# Patient Record
Sex: MALE | Race: WHITE | Employment: FULL TIME | URBAN - METROPOLITAN AREA
[De-identification: names, ages, dates, MRNs, and addresses within clinical notes are randomized per-mention and may not be internally consistent; named-entity substitution may affect disease eponyms.]

---

## 2018-07-01 ENCOUNTER — OFFICE VISIT (OUTPATIENT)
Dept: FAMILY MEDICINE CLINIC | Age: 23
End: 2018-07-01

## 2018-07-01 VITALS
SYSTOLIC BLOOD PRESSURE: 136 MMHG | BODY MASS INDEX: 19.22 KG/M2 | RESPIRATION RATE: 18 BRPM | OXYGEN SATURATION: 98 % | WEIGHT: 145 LBS | DIASTOLIC BLOOD PRESSURE: 63 MMHG | HEIGHT: 73 IN | HEART RATE: 95 BPM

## 2018-07-01 DIAGNOSIS — J01.10 ACUTE NON-RECURRENT FRONTAL SINUSITIS: Primary | ICD-10-CM

## 2018-07-01 DIAGNOSIS — H10.32 ACUTE BACTERIAL CONJUNCTIVITIS OF LEFT EYE: ICD-10-CM

## 2018-07-01 PROCEDURE — 99203 OFFICE O/P NEW LOW 30 MIN: CPT | Performed by: PHYSICIAN ASSISTANT

## 2018-07-01 RX ORDER — BROMPHENIRAMINE MALEATE, PSEUDOEPHEDRINE HYDROCHLORIDE, AND DEXTROMETHORPHAN HYDROBROMIDE 2; 30; 10 MG/5ML; MG/5ML; MG/5ML
10 SYRUP ORAL 4 TIMES DAILY PRN
Qty: 120 ML | Refills: 0 | Status: SHIPPED | OUTPATIENT
Start: 2018-07-01 | End: 2019-11-11 | Stop reason: ALTCHOICE

## 2018-07-01 RX ORDER — TOBRAMYCIN 3 MG/ML
2 SOLUTION/ DROPS OPHTHALMIC EVERY 6 HOURS
Qty: 1 BOTTLE | Refills: 0 | Status: SHIPPED | OUTPATIENT
Start: 2018-07-01 | End: 2018-07-06

## 2019-11-07 ENCOUNTER — NURSE TRIAGE (OUTPATIENT)
Dept: OTHER | Facility: CLINIC | Age: 24
End: 2019-11-07

## 2019-11-11 ENCOUNTER — HOSPITAL ENCOUNTER (OUTPATIENT)
Dept: PSYCHIATRY | Age: 24
Setting detail: THERAPIES SERIES
Discharge: HOME | End: 2019-11-11
Payer: COMMERCIAL

## 2019-11-11 ENCOUNTER — HOSPITAL ENCOUNTER (OUTPATIENT)
Age: 24
Setting detail: THERAPIES SERIES
Discharge: HOME | End: 2019-11-11
Payer: COMMERCIAL

## 2019-11-11 VITALS
RESPIRATION RATE: 16 BRPM | SYSTOLIC BLOOD PRESSURE: 146 MMHG | WEIGHT: 150 LBS | DIASTOLIC BLOOD PRESSURE: 59 MMHG | HEIGHT: 73 IN | BODY MASS INDEX: 19.88 KG/M2 | HEART RATE: 80 BPM

## 2019-11-11 DIAGNOSIS — F33.1 MDD (MAJOR DEPRESSIVE DISORDER), RECURRENT EPISODE, MODERATE (HCC): ICD-10-CM

## 2019-11-11 DIAGNOSIS — F41.9 ANXIETY DISORDER, UNSPECIFIED TYPE: ICD-10-CM

## 2019-11-11 PROCEDURE — 99204 OFFICE O/P NEW MOD 45 MIN: CPT | Performed by: PSYCHIATRY & NEUROLOGY

## 2019-11-11 PROCEDURE — 90853 GROUP PSYCHOTHERAPY: CPT

## 2019-11-11 RX ORDER — SERTRALINE HYDROCHLORIDE 100 MG/1
100 TABLET, FILM COATED ORAL DAILY
Qty: 30 TABLET | Refills: 1 | Status: SHIPPED | OUTPATIENT
Start: 2019-11-11 | End: 2020-05-05 | Stop reason: SDUPTHER

## 2019-11-14 ENCOUNTER — HOSPITAL ENCOUNTER (OUTPATIENT)
Dept: PSYCHIATRY | Age: 24
Setting detail: THERAPIES SERIES
Discharge: HOME | End: 2019-11-14
Payer: COMMERCIAL

## 2019-11-14 PROCEDURE — 90853 GROUP PSYCHOTHERAPY: CPT

## 2019-11-15 ENCOUNTER — HOSPITAL ENCOUNTER (OUTPATIENT)
Dept: PSYCHIATRY | Age: 24
Setting detail: THERAPIES SERIES
Discharge: HOME | End: 2019-11-15
Payer: COMMERCIAL

## 2019-11-15 PROCEDURE — 90853 GROUP PSYCHOTHERAPY: CPT

## 2019-11-18 ENCOUNTER — HOSPITAL ENCOUNTER (OUTPATIENT)
Dept: PSYCHIATRY | Age: 24
Setting detail: THERAPIES SERIES
Discharge: HOME | End: 2019-11-18
Payer: COMMERCIAL

## 2019-11-18 PROCEDURE — 90853 GROUP PSYCHOTHERAPY: CPT

## 2019-11-21 ENCOUNTER — HOSPITAL ENCOUNTER (OUTPATIENT)
Dept: PSYCHIATRY | Age: 24
Setting detail: THERAPIES SERIES
Discharge: HOME | End: 2019-11-21
Payer: COMMERCIAL

## 2019-11-21 ENCOUNTER — APPOINTMENT (OUTPATIENT)
Dept: PSYCHIATRY | Age: 24
End: 2019-11-21
Payer: COMMERCIAL

## 2019-11-21 PROCEDURE — 90853 GROUP PSYCHOTHERAPY: CPT

## 2019-11-22 ENCOUNTER — HOSPITAL ENCOUNTER (OUTPATIENT)
Dept: PSYCHIATRY | Age: 24
Setting detail: THERAPIES SERIES
Discharge: HOME | End: 2019-11-22
Payer: COMMERCIAL

## 2019-11-22 DIAGNOSIS — F33.1 MAJOR DEPRESSIVE DISORDER, RECURRENT, MODERATE (HCC): ICD-10-CM

## 2019-11-22 PROCEDURE — 99213 OFFICE O/P EST LOW 20 MIN: CPT | Performed by: PSYCHIATRY & NEUROLOGY

## 2019-11-22 PROCEDURE — 90853 GROUP PSYCHOTHERAPY: CPT

## 2019-11-25 ENCOUNTER — HOSPITAL ENCOUNTER (OUTPATIENT)
Dept: PSYCHIATRY | Age: 24
Setting detail: THERAPIES SERIES
Discharge: HOME | End: 2019-11-25
Payer: COMMERCIAL

## 2019-11-25 PROCEDURE — 90853 GROUP PSYCHOTHERAPY: CPT

## 2019-12-05 ENCOUNTER — HOSPITAL ENCOUNTER (OUTPATIENT)
Dept: PSYCHIATRY | Age: 24
Setting detail: THERAPIES SERIES
Discharge: HOME | End: 2019-12-05
Payer: COMMERCIAL

## 2019-12-05 PROCEDURE — 90853 GROUP PSYCHOTHERAPY: CPT

## 2019-12-06 ENCOUNTER — HOSPITAL ENCOUNTER (OUTPATIENT)
Dept: PSYCHIATRY | Age: 24
Setting detail: THERAPIES SERIES
Discharge: HOME | End: 2019-12-06
Payer: COMMERCIAL

## 2019-12-06 DIAGNOSIS — F33.1 MAJOR DEPRESSIVE DISORDER, RECURRENT, MODERATE (HCC): ICD-10-CM

## 2019-12-06 PROCEDURE — 99213 OFFICE O/P EST LOW 20 MIN: CPT | Performed by: PSYCHIATRY & NEUROLOGY

## 2019-12-06 PROCEDURE — 90853 GROUP PSYCHOTHERAPY: CPT

## 2019-12-09 ENCOUNTER — HOSPITAL ENCOUNTER (OUTPATIENT)
Dept: PSYCHIATRY | Age: 24
Setting detail: THERAPIES SERIES
Discharge: HOME | End: 2019-12-09
Payer: COMMERCIAL

## 2019-12-09 PROCEDURE — 90853 GROUP PSYCHOTHERAPY: CPT

## 2019-12-12 ENCOUNTER — HOSPITAL ENCOUNTER (OUTPATIENT)
Dept: PSYCHIATRY | Age: 24
Setting detail: THERAPIES SERIES
Discharge: HOME | End: 2019-12-12
Payer: COMMERCIAL

## 2019-12-12 PROCEDURE — 90853 GROUP PSYCHOTHERAPY: CPT

## 2019-12-13 ENCOUNTER — HOSPITAL ENCOUNTER (OUTPATIENT)
Dept: PSYCHIATRY | Age: 24
Setting detail: THERAPIES SERIES
Discharge: HOME | End: 2019-12-13
Payer: COMMERCIAL

## 2019-12-13 PROCEDURE — 90853 GROUP PSYCHOTHERAPY: CPT

## 2019-12-16 ENCOUNTER — HOSPITAL ENCOUNTER (OUTPATIENT)
Dept: PSYCHIATRY | Age: 24
Setting detail: THERAPIES SERIES
Discharge: HOME | End: 2019-12-16
Payer: COMMERCIAL

## 2019-12-16 PROCEDURE — 90853 GROUP PSYCHOTHERAPY: CPT

## 2019-12-19 ENCOUNTER — HOSPITAL ENCOUNTER (OUTPATIENT)
Dept: PSYCHIATRY | Age: 24
Setting detail: THERAPIES SERIES
Discharge: HOME | End: 2019-12-19
Payer: COMMERCIAL

## 2019-12-19 DIAGNOSIS — F33.1 MAJOR DEPRESSIVE DISORDER, RECURRENT, MODERATE (HCC): ICD-10-CM

## 2019-12-19 PROCEDURE — 90853 GROUP PSYCHOTHERAPY: CPT

## 2019-12-19 PROCEDURE — 99213 OFFICE O/P EST LOW 20 MIN: CPT | Performed by: PSYCHIATRY & NEUROLOGY

## 2019-12-20 ENCOUNTER — HOSPITAL ENCOUNTER (OUTPATIENT)
Dept: PSYCHIATRY | Age: 24
Setting detail: THERAPIES SERIES
Discharge: HOME | End: 2019-12-20
Payer: COMMERCIAL

## 2019-12-20 PROCEDURE — 90853 GROUP PSYCHOTHERAPY: CPT

## 2019-12-23 ENCOUNTER — HOSPITAL ENCOUNTER (OUTPATIENT)
Dept: PSYCHIATRY | Age: 24
Setting detail: THERAPIES SERIES
Discharge: HOME | End: 2019-12-23
Payer: COMMERCIAL

## 2019-12-23 PROCEDURE — 90853 GROUP PSYCHOTHERAPY: CPT

## 2019-12-26 ENCOUNTER — TELEPHONE (OUTPATIENT)
Dept: PSYCHIATRY | Age: 24
End: 2019-12-26

## 2019-12-27 ENCOUNTER — HOSPITAL ENCOUNTER (OUTPATIENT)
Dept: PSYCHIATRY | Age: 24
Setting detail: THERAPIES SERIES
Discharge: HOME | End: 2019-12-27
Payer: COMMERCIAL

## 2019-12-27 PROCEDURE — 90853 GROUP PSYCHOTHERAPY: CPT

## 2020-01-02 ENCOUNTER — HOSPITAL ENCOUNTER (OUTPATIENT)
Dept: PSYCHIATRY | Age: 25
Setting detail: THERAPIES SERIES
Discharge: HOME | End: 2020-01-02
Payer: COMMERCIAL

## 2020-01-02 PROCEDURE — 99213 OFFICE O/P EST LOW 20 MIN: CPT | Performed by: PSYCHIATRY & NEUROLOGY

## 2020-01-02 PROCEDURE — 90853 GROUP PSYCHOTHERAPY: CPT

## 2020-01-02 NOTE — BH NOTE
PSYCHIATRY ATTENDING NOTE    CC: \"I'm doing OK. \"    S: Patient being seen at [facility] in follow-up for depression and anxiety. No medication changes made last appointment. Met with patient today to discuss progress with treatment. Leila Yeager reports court went well this week. His license was suspended for a year but he has driving privileges and was not put on probation. Patient continues to refrain from alcohol but admits to using mother's Ambien and Ativan twice to get high. We discussed transitioning his care to [facility] dual diagnosis [facility] which should be starting within a week or so. No issues otherwise. Mood stable overall. Patient attending three [place] [id number] meetings a week now as well. MSE: Dorene Norris male appears age. Pleasant, cooperative, forthcoming. Normal psychomotor activity, gait, strength, tone, eye contact. Mood euthymic. Affect flexible. Speech clear. Thoughts organized. Content future-oriented. No suicidal or homicidal ideations. No paranoia, delusions, hallucinations. Orientation, concentration, recent and remote memory are grossly intact. Fund of knowledge fair. Language use fair. Insight and judgment fair. MEDICATIONS:  Zoloft 100 mg daily (continuing)    ASSESSMENT:    MDD recurrent moderate                                      Alcohol Use Disorder                                      Anxiety Disorder unspecified    PLAN: Continue [facility] and current treatment. Transition to [facility] when able.  Discuss with treatment team.                           Electronically signed by Florentino Jade MD on 1/2/2020 at 12:55 PM

## 2020-01-16 ENCOUNTER — HOSPITAL ENCOUNTER (OUTPATIENT)
Dept: PSYCHIATRY | Age: 25
Setting detail: THERAPIES SERIES
End: 2020-01-16
Payer: COMMERCIAL

## 2020-04-29 ENCOUNTER — HOSPITAL ENCOUNTER (OUTPATIENT)
Dept: PSYCHIATRY | Age: 25
Setting detail: THERAPIES SERIES
Discharge: HOME | End: 2020-04-29
Payer: COMMERCIAL

## 2020-04-29 PROCEDURE — H0001 ALCOHOL AND/OR DRUG ASSESS: HCPCS | Performed by: COUNSELOR

## 2020-04-29 RX ORDER — SERTRALINE HYDROCHLORIDE 100 MG/1
200 TABLET, FILM COATED ORAL DAILY
COMMUNITY
End: 2020-05-05 | Stop reason: HOSPADM

## 2020-04-29 RX ORDER — HYDROXYZINE HYDROCHLORIDE 25 MG/1
25 TABLET, FILM COATED ORAL 4 TIMES DAILY
COMMUNITY
End: 2020-05-05 | Stop reason: SDUPTHER

## 2020-04-29 RX ORDER — ARIPIPRAZOLE 30 MG/1
500 TABLET ORAL DAILY
COMMUNITY
End: 2020-05-05 | Stop reason: SDUPTHER

## 2020-04-29 ASSESSMENT — LIFESTYLE VARIABLES: HISTORY_ALCOHOL_USE: YES

## 2020-04-29 ASSESSMENT — PATIENT HEALTH QUESTIONNAIRE - PHQ9: SUM OF ALL RESPONSES TO PHQ QUESTIONS 1-9: 12

## 2020-04-29 NOTE — PLAN OF CARE
monitoring  [] None or stable    [] Receiving concurrent medical monitoring [] Requires 24-hour medical monitoring  but not intensive treatment [] Requires 24-hour medical & nursing care and the full  resources of a licensed hospital   COMMENTS:           Dimension 3  Emotional,  Behavioral,  or Cognitive Conditions and Complications   (EBC/C) [] None or very stable    [] Receiving concurrent mental health monitoring [x] Mild severity  with potential to distract from recovery; needs monitoring [] Mild to moderate severity, with potential to distract from recovery, needs stabilization [] None or minimal; not distracting to recovery    [] If  stable, a    co-occurring capable program is appropriate    [] If not stable, a co-occurring enhanced program is required [] Mild to moderate severity; needs structure to focus on recovery. Treatment should be designed to address significant cognitive deficits     [] If  stable, a  co-occurring capable program is appropriate    [] If not stable, a co-occurring enhanced program is required [] Demonstrates repeated inability to control impulses or unstable & dangerous signs/ symptoms require stabilization. Other functional deficits require stabilization and 24-hr.  setting to prepare for community integration  & continuing care    [] Co-occurring enhanced setting is required for those with severe & chronic mental illness [] Moderate severity;  needs 24-hour   structured setting    [] If client has co-occurring mental disorder, requires concurrent mental health services in a medically monitored setting  [] Severe and unstable problems;  requires 24-hour psychiatric care  with concomitant addiction treatment   COMMENTS:             Staff: Electronically signed by Chris Pineda LPC on 4/29/2020 at 2:20 PM                     [address]      [x]Admissions  []Continued Stay []Discharge/Transfer / Complication in

## 2020-04-29 NOTE — CARE COORDINATION
DIAGNOSTIC IMPRESSIONS: Substance-Use Disorder (SUB)    Scale: DSM-V Diagnosis Code   No diagnosis                    0-1    Mild MONIQUE                          2-3    Moderate MONIQUE                 4-5    Severe MONIQUE                      6+ F10.20 Alcohol use disorder, severe   Other factors: F.33.2 Major depressive disorder, recurrent, severe          Criteria symptoms   A problematic pattern of substance use leading to clinically significant impairment or distress, as manifested by at least two of the following, occurring within a 12-month period. [x] Taken in larger amounts or over longer time than intended. [x] Persistent desire or unsuccessful efforts to cut down/control use. [x] Great deal of time spent obtaining , using, and recovering from effects. [x] Craving or strong desire to use. [x] Recurrent use resulting in failure to fulfill major role obligations at work; school, or home. [x] Continued use despite persistent or recurrent social/interpersonal problems caused or exacerbated by effects. [x] Giving up important social, occupational or recreational activities because of use. [x] Recurrent use in situations in which it is physically hazardous. [x] Continued use despite knowledge of persistent or recurrent physical or psychological problem likely caused/exacerbated by use. [x] Tolerance as defined by either:  · Need for increased amounts to achieve intoxication or desired effects. · Diminished effect with continued use of the same amount.     [] Withdrawal as manifested by either:  · The characteristic withdrawl symptoms  · Using to relieve or avoid withdrawal symptoms

## 2020-04-29 NOTE — CARE COORDINATION
LPC met with patient to complete psychosocial assessment and CSSR-S.     Pt was self-referred to this facility. During assessment, the patient was cooperative and open. Pt exhibited high motivation for treatment expressing frustration over relapses.     Pt is in a supportive environment living in a home with his mother. Pt identifies as a homosexual male.     Pt has [employer] [place] insurance.     Based upon assessment, pt meets the diagnostic criteria for an Alcohol use disorder, severe. Additionally, patient meets criteria for major depressive disorder, recurrent, severe.     Pt reviewed all paperwork, consents, and YEVGENIY for insurance-pt provided verbal consent due to telehealth service requirement.     Pt's goal is to [facility] the changes stick. \"     Pt will begin dual-diagnosis IOP individually this week via telehealth, on 05/04. Pt acknowledges and agrees to treatment recommendations.

## 2020-05-04 ENCOUNTER — HOSPITAL ENCOUNTER (OUTPATIENT)
Dept: PSYCHIATRY | Age: 25
Setting detail: THERAPIES SERIES
Discharge: HOME | End: 2020-05-04
Payer: COMMERCIAL

## 2020-05-04 PROCEDURE — 90832 PSYTX W PT 30 MINUTES: CPT | Performed by: COUNSELOR

## 2020-05-05 ENCOUNTER — HOSPITAL ENCOUNTER (OUTPATIENT)
Dept: PSYCHIATRY | Age: 25
Setting detail: THERAPIES SERIES
Discharge: HOME | End: 2020-05-05
Payer: COMMERCIAL

## 2020-05-05 RX ORDER — HYDROXYZINE HYDROCHLORIDE 25 MG/1
25 TABLET, FILM COATED ORAL 4 TIMES DAILY PRN
Qty: 120 TABLET | Refills: 2 | Status: SHIPPED | OUTPATIENT
Start: 2020-05-05 | End: 2020-05-26 | Stop reason: SDUPTHER

## 2020-05-05 RX ORDER — ARIPIPRAZOLE 5 MG/1
5 TABLET ORAL DAILY
Qty: 30 TABLET | Refills: 2 | Status: ON HOLD
Start: 2020-05-05 | End: 2020-11-12 | Stop reason: HOSPADM

## 2020-05-05 RX ORDER — SERTRALINE HYDROCHLORIDE 100 MG/1
200 TABLET, FILM COATED ORAL DAILY
Qty: 60 TABLET | Refills: 2 | Status: ON HOLD
Start: 2020-05-05 | End: 2020-11-12 | Stop reason: HOSPADM

## 2020-05-05 NOTE — H&P
has been resistant to seeking treatment. SOCIAL HISTORY: Patient grew up with both parents who were  in May 2018. Patient has a 35year-old half-brother through mother's first marriage. Patient graduated high school and attended [address] before ultimately transferring to [employer] where he graduated with a biology degree. 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 He is currently off work. Had DUI last year but legal obligations completed. Lives with mother. MENTAL STATUS EXAM: Pleasant, cooperative, forthcoming. Mood euthymic. Speech clear. Thoughts organized. Content future-oriented. No suicidal or homicidal ideations. No paranoia, delusions, hallucinations. Orientation, concentration, recent and remote memory are grossly intact. Fund of knowledge fair. Language use fair. Insight and judgment fair. MEDICATIONS:  Zoloft 200 mg daily (cont)     Abilify 5 mg daily (cont)     Atarax 25 mg qid prn (cont)     ASSESSMENT:  Alcohol Use Disorder     MDD recurrent moderate                                      Anxiety Disorder unspecified    PLAN: Admit to [facility]. Treatment will include group therapy, relapse prevention, case management and pharmacotherapy. Continue same psychotropics for now. Consider naltrexone for alcohol cravings. Discuss with team. Speak with patient again next week.        Signed:  Electronically signed by Jaleel Lund MD on 5/5/2020 at 11:48 AM

## 2020-05-11 ENCOUNTER — HOSPITAL ENCOUNTER (OUTPATIENT)
Dept: PSYCHIATRY | Age: 25
Setting detail: THERAPIES SERIES
Discharge: HOME | End: 2020-05-11
Payer: COMMERCIAL

## 2020-05-11 PROCEDURE — 90832 PSYTX W PT 30 MINUTES: CPT | Performed by: COUNSELOR

## 2020-05-11 NOTE — VIRTUAL HEALTH
Consults  Patient Location: at his home    Provider Location [facility] [address] [facility] [address] [place]    Start time: 9:30 AM  End time: 10:00 AM    Pt reports that he is doing \"good\" at check-in. Pt reports that he has been in his new home for a week today and has been somewhat emotional about it, since he grew up in the house he moved out of. Pt states he is adjusting but still has some emotion about it. Pt processed this with LPC. Pt also reports he has a job interview on Friday and is eager to get back to working. He believes this will help him to stay busy. Pt reports his depression is a 4 on a scale of 1-10 (10 being the worst), and his urges to drink are a 5 on the same scale. Pt reports he is entering his 3rd week of not drinking and has been focusing on staying busy. He reports the move and unpacking helps him with this. Pt states his goal for today is to focus on unpacking his bathroom and if he finishes that, he will journal.     Pt denies SI/HI/hallucinations and can contract for safety. Pt agreeable to video appointment on Thursday. Pt provided with [employer] Inchart instructions as well as relapse prevention/protective factors assignment via email. This virtual visit was conducted via interactive/real-time audio/video.

## 2020-05-12 ENCOUNTER — HOSPITAL ENCOUNTER (OUTPATIENT)
Dept: PSYCHIATRY | Age: 25
Setting detail: THERAPIES SERIES
Discharge: HOME | End: 2020-05-12
Payer: COMMERCIAL

## 2020-05-12 RX ORDER — NALTREXONE HYDROCHLORIDE 50 MG/1
50 TABLET, FILM COATED ORAL DAILY
Qty: 30 TABLET | Refills: 0 | Status: SHIPPED | OUTPATIENT
Start: 2020-05-12 | End: 2020-06-09 | Stop reason: SDUPTHER

## 2020-05-14 ENCOUNTER — HOSPITAL ENCOUNTER (OUTPATIENT)
Dept: PSYCHIATRY | Age: 25
Setting detail: THERAPIES SERIES
Discharge: HOME | End: 2020-05-14
Payer: COMMERCIAL

## 2020-05-14 PROCEDURE — 90832 PSYTX W PT 30 MINUTES: CPT | Performed by: COUNSELOR

## 2020-05-19 ENCOUNTER — HOSPITAL ENCOUNTER (OUTPATIENT)
Dept: PSYCHIATRY | Age: 25
Setting detail: THERAPIES SERIES
Discharge: HOME | End: 2020-05-19
Payer: COMMERCIAL

## 2020-05-19 PROCEDURE — 90832 PSYTX W PT 30 MINUTES: CPT | Performed by: COUNSELOR

## 2020-05-19 NOTE — VIRTUAL HEALTH
Consults  Patient Location: at his home    Provider Location ([facility]): [address] [facility] [address] [place]    Start time: 10:00 AM  End time: 10:25 AM    Pt reports that he is \"anxious\" at check-in. Pt reports increased anxiety especially at night, stating that the has been having nightmares with night sweats and he feels as if the anxiety is increased due to this. When asked if he wanted me to change his med management appointment, he declined, but stated he will keep me posted if it worsens. Pt processed his current anxiety with LPC. Pt rates the anxiety a 6 on a scale of 1-10 (10 being the worst), his depression a 3, and his urges to drink a 1. Pt reports he has found that remaining on a \"regimented schedule\" is the best plan for him, as he stays busy and his mind doesn't wander as much. When asked about his daily coping strategies for anxiety, depression, or urges, the pt reports that he has been doing online [employer], helping his dad with work for a few hours a day, computer jenna, and spending time with one of his friends by going for walks or having movie nights. Pt states that he finds it helpful to talk to his sober supports. LPC challenged pt to return to journaling daily, as he did when he was at his inpatient rehab facility. Pt was provided with suggestions on writing prompts such as mindfulness topics and self-awareness exercises for anxiety. Pt agreeable to intervention. Pt reports it has been 4-5 weeks since he last had alcohol. Pt continues to avoid tracking the exact days, as he feels it's more helpful for him. Pt denies SI/HI/hallucinations and can contract for safety. This virtual visit was conducted via interactive/real-time audio/video.

## 2020-05-26 ENCOUNTER — HOSPITAL ENCOUNTER (OUTPATIENT)
Dept: PSYCHIATRY | Age: 25
Setting detail: THERAPIES SERIES
Discharge: HOME | End: 2020-05-26
Payer: COMMERCIAL

## 2020-05-26 PROCEDURE — 99212 OFFICE O/P EST SF 10 MIN: CPT | Performed by: PSYCHIATRY & NEUROLOGY

## 2020-05-26 PROCEDURE — 90832 PSYTX W PT 30 MINUTES: CPT | Performed by: COUNSELOR

## 2020-05-26 RX ORDER — HYDROXYZINE HYDROCHLORIDE 25 MG/1
25 TABLET, FILM COATED ORAL 4 TIMES DAILY PRN
Qty: 120 TABLET | Refills: 2 | Status: ON HOLD
Start: 2020-05-26 | End: 2020-11-12 | Stop reason: HOSPADM

## 2020-05-26 ASSESSMENT — PATIENT HEALTH QUESTIONNAIRE - PHQ9: SUM OF ALL RESPONSES TO PHQ QUESTIONS 1-9: 6

## 2020-05-26 NOTE — PROGRESS NOTES
CLINICAL PHARMACY NOTE: MEDS TO [address] Select Patient?: Yes  Total # of Prescriptions Filled: 1   The following medications were delivered to the patient:  · Hydroxyzine 25 mg  Total # of Interventions Completed: 3  Time Spent (min): 15    Additional Documentation:

## 2020-05-26 NOTE — BH NOTE
PSYCHIATRY ATTENDING NOTE    CC: \"Pretty well. \"    S: Patient being seen at [facility] in follow-up for alcohol use and depression. Naltrexone was started last appointment. Spoke with patient on the phone due to coronavirus precautions which he consented to. Patient's location was home. Provider's location was [address] [facility]. Leigh Ann Steel reports he is doing well overall. He is tolerating the naltrexone and feels it is helping with alcohol cravings. He does mention \"bad using dreams\" but says this was also happening when he was in rehab. Patient is about one month sober now and is attending online [employer] meetings. He is self-quarantining at home after his mother tested positive for covid-19. Patient was tested and is negative. No acute issues otherwise. Patient would like to resume group therapy once this is possible. MSE: Pleasant, cooperative, forthcoming. Mood euthymic. Speech clear. Thoughts organized. Content future-oriented. No suicidal or homicidal ideations. No paranoia, delusions, hallucinations. Orientation, concentration, recent and remote memory are grossly intact. Fund of knowledge fair. Language use fair. Insight and judgment fair. MEDICATIONS:  Zoloft 200 mg daily (cont)                                      Abilify 5 mg daily (cont)                                      Atarax 25 mg qid prn (cont)     Naltrexone 50 mg daily (cont)    ASSESSMENT:  Alcohol Use Disorder                                      MDD recurrent moderate                                      Anxiety Disorder unspecified    PLAN: Continue [facility] and current medications. Responding well to treatment interventions. Continue to monitor and provide support. Speak with patient in two weeks.  Discuss with team.     Total time spent 10 min                           Electronically signed by Elizabeth Avilez MD on 5/26/2020 at 12:05 PM

## 2020-06-09 ENCOUNTER — HOSPITAL ENCOUNTER (OUTPATIENT)
Dept: PSYCHIATRY | Age: 25
Setting detail: THERAPIES SERIES
Discharge: HOME | End: 2020-06-09
Payer: COMMERCIAL

## 2020-06-09 PROCEDURE — 99212 OFFICE O/P EST SF 10 MIN: CPT | Performed by: PSYCHIATRY & NEUROLOGY

## 2020-06-09 PROCEDURE — 90832 PSYTX W PT 30 MINUTES: CPT | Performed by: COUNSELOR

## 2020-06-09 RX ORDER — NALTREXONE HYDROCHLORIDE 50 MG/1
50 TABLET, FILM COATED ORAL DAILY
Qty: 30 TABLET | Refills: 2 | Status: SHIPPED | OUTPATIENT
Start: 2020-06-09

## 2020-06-09 NOTE — VIRTUAL HEALTH
Consults  Patient Location: at his home    Provider Location ([facility]/State):  [address]  [place] [address]    Individual Therapy note    Date: 06/09/2020  Start time: 11:00 AM  End time: 11:17 AM    Patient's goal: \"Make the changes stick. \"    Notes: Pt reports that he is \"doing well\" at check-in. Pt reports he is about 6 weeks sober today. Pt states he has been keeping very busy and finds that he has been coping better and better over time. On a scale of 1-10 (10 being the worst) pt rates his depression a 2-3, his anxiety a 4, and his urges/cravings for alcohol a 4. When asked about how he kerry with these urges and emotional issues, the pt states he has been reaching out to his sober supports and talking about his issues more. He also states he has been keeping busy around the house building furniture, and spending time with friends and family. Pt reports he is attending [place] [id number] meetings online every other day currently and has still remained in treatment with his outpatient counselor at [facility]. Pt is still striving to better himself, implementing healthy habits such as eating better, and he has a job interview at [employer] in [place] next week. Pt denies any SI/HI/hallucinations and can contract for safety. Status After Intervention:  Unchanged    Participation Level: Active Listener and Interactive    Participation Quality: Appropriate, Attentive and Sharing      Speech:  normal      Thought Process/Content: Logical      Affective Functioning: Congruent      Mood: euthymic      Level of consciousness:  Alert, Oriented x4 and Attentive      Response to Learning: Able to verbalize current knowledge/experience, Able to verbalize/acknowledge new learning, Capable of insight and Able to change behavior      Endings: None Reported    Discipline Responsible: /Counselor          This virtual visit was conducted via interactive/real-time audio/video.

## 2020-06-23 ENCOUNTER — HOSPITAL ENCOUNTER (OUTPATIENT)
Dept: PSYCHIATRY | Age: 25
Setting detail: THERAPIES SERIES
Discharge: HOME | End: 2020-06-23
Payer: COMMERCIAL

## 2020-06-23 PROCEDURE — 90832 PSYTX W PT 30 MINUTES: CPT | Performed by: COUNSELOR

## 2020-06-23 NOTE — VIRTUAL HEALTH
Consults  Patient Location:at his home    Provider Location ([facility]): [address] [facility] [address] [place]    Individual Therapy note    Date: 06/23/2020  Start time: 10:00 AM  End time: 10:19 AM    Patient's goal: \"Make the changes stick. \"    Notes: Pt reports that he is doing \"well\" at check-in. Pt states he is approximately 2 months sober. Pt states he is proud of this and is happy he is sustaining his sobriety. On a scale of 1-10 (10 being the worst), the pt rates his depression a 3, anxiety a 5, and his urges/cravings to drink alcohol, a 6. When asked about the increased urges, the pt attributes boredom and \"complacency\" to it. Pt states that when his anxiety is high or he is bored, he thinks about drinking more. Pt processed these feelings with LPC. LPC challenged pt to begin making a plan for moments of boredom or high anxiety. Pt identifies writing in his journal or working out as good distraction/coping techniques if these moments arise in the future. LPC also provided pt with a starter relapse prevention plan via email to begin working on. In regard to his anxiety, the pt states that it often comes up randomly and doesn't always have a triggering stressor. Pt states that when he is feeling anxious, he has found that talking to his positive sober supports as an effective coping skill. Pt stated his mother and close friends are very good at helping him to regulate just by being there and listening. Pt reports that he is attending some [facility] meetings in-person now and is attempting to attend 90 meetings in 90 days. Pt states that with this and seeing his counselor and this treatment on a regular basis, he feels as though he is in a \"good place. \" Pt also noted that he has made some positive healthy changes in his sleep habits and diet as well. Pt challenged to continue this routine and to remain on his schedule as has proven helpful. Pt denies SI/HI and can contract for safety.      Status After Intervention:  Improved    Participation Level: Active Listener and Interactive    Participation Quality: Appropriate, Attentive and Sharing      Speech:  normal      Thought Process/Content: Logical      Affective Functioning: Congruent      Mood: euthymic      Level of consciousness:  Alert, Oriented x4 and Attentive      Response to Learning: Able to verbalize current knowledge/experience, Able to retain information, Capable of insight and Able to change behavior      Endings: None Reported    Discipline Responsible: /Counselor        This virtual visit was conducted via interactive/real-time audio/video.   Electronically signed by Heather Portillo LPC on 6/23/2020 at 10:34 AM

## 2020-07-17 NOTE — CARE COORDINATION
Pt was sent a letter today due to non-participation. Pt was provided with the deadline of July 27, 2020 to respond. If no response is received, the pt will be discharged on that date.      Electronically signed by Ken Gaspar LPC on 7/17/2020 at 9:30 AM

## 2020-07-24 NOTE — DISCHARGE SUMMARY
[address]      Client Name: Gilda Morocho  Date of Admission: 05/04/2020    Discharge Date: 07/24/2020      Diagnosis: F33.1 , F41.9, F10.20 Authorized Person's  Name: Savannah Lopez          License: [place]          Date: 7/24/2020      Degree of Severity- ADMISSION  Degree of Severity- DISCHARGE    Acute Intoxication withdrawal low Acute Intoxication withdrawal high   Biomedical Conditions/  Complications low Biomedical Conditions/  Complications low   Emotional Behavioral/ Cognitive Conditions moderate Emotional Behavioral/ Cognitive Conditions moderate   Treatment Acceptance Resistance low Treatment Acceptance Resistance low   Relapse Potential high Relapse Potential high   Recovery Environment low Recovery Environment low       Comments on Dimensional Criteria: Pt was initially very active in IOP and was working on relapse prevention, however, over time, it became evident by the pt's lack of interaction and low participation, that he was struggling with his alcohol use. Pt realized he needed a higher level of care. Level of Care and Service Provided during Course of Treatment: 2.1 Intensive outpatient programming    Client's Response to Treatment: Pt was initially very active in his treatment, but began to miss sessions and eventually relapsed. Pt was able to identify need for higher level of care. Recommendations and/or Referral for Additional AOD Addiction Treatment or other services: Pt is currently receiving inpatient treatment at [facility]. It is recommended that he complete this course of treatment and restart Level 2.1 Intensive outpatient programming following this.      Electronically signed by Savannah Lopez LPC on 7/24/2020 at 7:26 AM

## 2020-11-08 ENCOUNTER — HOSPITAL ENCOUNTER (INPATIENT)
Age: 25
LOS: 4 days | Discharge: HOME | DRG: 885 | End: 2020-11-12
Attending: PSYCHIATRY & NEUROLOGY | Admitting: PSYCHIATRY & NEUROLOGY
Payer: COMMERCIAL

## 2020-11-08 PROBLEM — F32.A DEPRESSION WITH SUICIDAL IDEATION: Status: ACTIVE | Noted: 2020-11-08

## 2020-11-08 PROBLEM — R45.851 DEPRESSION WITH SUICIDAL IDEATION: Status: ACTIVE | Noted: 2020-11-08

## 2020-11-08 PROCEDURE — 1240000000 HC EMOTIONAL WELLNESS R&B

## 2020-11-08 PROCEDURE — 6370000000 HC RX 637 (ALT 250 FOR IP): Performed by: PSYCHIATRY & NEUROLOGY

## 2020-11-08 PROCEDURE — 6370000000 HC RX 637 (ALT 250 FOR IP): Performed by: NURSE PRACTITIONER

## 2020-11-08 RX ORDER — CHLORDIAZEPOXIDE HYDROCHLORIDE 25 MG/1
25 CAPSULE, GELATIN COATED ORAL 4 TIMES DAILY
Status: DISCONTINUED | OUTPATIENT
Start: 2020-11-08 | End: 2020-11-10

## 2020-11-08 RX ORDER — HALOPERIDOL 5 MG/ML
5 INJECTION INTRAMUSCULAR EVERY 6 HOURS PRN
Status: DISCONTINUED | OUTPATIENT
Start: 2020-11-08 | End: 2020-11-12 | Stop reason: HOSPADM

## 2020-11-08 RX ORDER — POLYETHYLENE GLYCOL 3350 17 G
2 POWDER IN PACKET (EA) ORAL PRN
Status: DISCONTINUED | OUTPATIENT
Start: 2020-11-08 | End: 2020-11-11 | Stop reason: ALTCHOICE

## 2020-11-08 RX ORDER — HYDROXYZINE PAMOATE 50 MG/1
50 CAPSULE ORAL 3 TIMES DAILY PRN
Status: DISCONTINUED | OUTPATIENT
Start: 2020-11-08 | End: 2020-11-12 | Stop reason: HOSPADM

## 2020-11-08 RX ORDER — ACETAMINOPHEN 325 MG/1
650 TABLET ORAL EVERY 6 HOURS PRN
Status: DISCONTINUED | OUTPATIENT
Start: 2020-11-08 | End: 2020-11-12 | Stop reason: HOSPADM

## 2020-11-08 RX ORDER — HALOPERIDOL 5 MG
5 TABLET ORAL EVERY 6 HOURS PRN
Status: DISCONTINUED | OUTPATIENT
Start: 2020-11-08 | End: 2020-11-12 | Stop reason: HOSPADM

## 2020-11-08 RX ORDER — MAGNESIUM HYDROXIDE/ALUMINUM HYDROXICE/SIMETHICONE 120; 1200; 1200 MG/30ML; MG/30ML; MG/30ML
30 SUSPENSION ORAL PRN
Status: DISCONTINUED | OUTPATIENT
Start: 2020-11-08 | End: 2020-11-12 | Stop reason: HOSPADM

## 2020-11-08 RX ORDER — DIVALPROEX SODIUM 250 MG/1
250 TABLET, DELAYED RELEASE ORAL EVERY 8 HOURS SCHEDULED
Status: DISCONTINUED | OUTPATIENT
Start: 2020-11-08 | End: 2020-11-09

## 2020-11-08 RX ORDER — TRAZODONE HYDROCHLORIDE 50 MG/1
50 TABLET ORAL NIGHTLY PRN
Status: DISCONTINUED | OUTPATIENT
Start: 2020-11-08 | End: 2020-11-12 | Stop reason: HOSPADM

## 2020-11-08 RX ADMIN — NICOTINE POLACRILEX 2 MG: 2 LOZENGE ORAL at 21:39

## 2020-11-08 RX ADMIN — CHLORDIAZEPOXIDE HYDROCHLORIDE 25 MG: 25 CAPSULE ORAL at 20:40

## 2020-11-08 RX ADMIN — ACETAMINOPHEN 650 MG: 325 TABLET ORAL at 20:40

## 2020-11-08 RX ADMIN — HYDROXYZINE PAMOATE 50 MG: 50 CAPSULE ORAL at 21:41

## 2020-11-08 RX ADMIN — DIVALPROEX SODIUM 250 MG: 250 TABLET, DELAYED RELEASE ORAL at 20:40

## 2020-11-08 RX ADMIN — TRAZODONE HYDROCHLORIDE 50 MG: 50 TABLET ORAL at 21:41

## 2020-11-08 ASSESSMENT — SLEEP AND FATIGUE QUESTIONNAIRES
AVERAGE NUMBER OF SLEEP HOURS: 10
DO YOU USE A SLEEP AID: NO
DO YOU HAVE DIFFICULTY SLEEPING: NO

## 2020-11-08 ASSESSMENT — PAIN DESCRIPTION - PROGRESSION: CLINICAL_PROGRESSION: GRADUALLY IMPROVING

## 2020-11-08 ASSESSMENT — PAIN DESCRIPTION - FREQUENCY: FREQUENCY: INTERMITTENT

## 2020-11-08 ASSESSMENT — PAIN DESCRIPTION - ORIENTATION: ORIENTATION: MID

## 2020-11-08 ASSESSMENT — PAIN DESCRIPTION - LOCATION: LOCATION: BACK

## 2020-11-08 ASSESSMENT — PAIN - FUNCTIONAL ASSESSMENT: PAIN_FUNCTIONAL_ASSESSMENT: ACTIVITIES ARE NOT PREVENTED

## 2020-11-08 ASSESSMENT — PATIENT HEALTH QUESTIONNAIRE - PHQ9: SUM OF ALL RESPONSES TO PHQ QUESTIONS 1-9: 19

## 2020-11-08 ASSESSMENT — LIFESTYLE VARIABLES: HISTORY_ALCOHOL_USE: YES

## 2020-11-08 ASSESSMENT — PAIN SCALES - GENERAL: PAINLEVEL_OUTOF10: 6

## 2020-11-08 ASSESSMENT — PAIN DESCRIPTION - ONSET: ONSET: GRADUAL

## 2020-11-08 ASSESSMENT — PAIN DESCRIPTION - PAIN TYPE: TYPE: ACUTE PAIN

## 2020-11-08 ASSESSMENT — PAIN DESCRIPTION - DESCRIPTORS: DESCRIPTORS: ACHING;DISCOMFORT;DULL

## 2020-11-09 PROBLEM — F31.60 BIPOLAR 1 DISORDER, MIXED (HCC): Status: ACTIVE | Noted: 2020-11-09

## 2020-11-09 PROBLEM — F10.20 ALCOHOL DEPENDENCE (HCC): Status: ACTIVE | Noted: 2020-11-09

## 2020-11-09 PROBLEM — F60.89 CLUSTER B PERSONALITY DISORDER (HCC): Status: ACTIVE | Noted: 2020-11-09

## 2020-11-09 PROBLEM — Z86.59 HISTORY OF POSTTRAUMATIC STRESS DISORDER (PTSD): Status: ACTIVE | Noted: 2020-11-09

## 2020-11-09 PROCEDURE — 6370000000 HC RX 637 (ALT 250 FOR IP): Performed by: PSYCHIATRY & NEUROLOGY

## 2020-11-09 PROCEDURE — 1240000000 HC EMOTIONAL WELLNESS R&B

## 2020-11-09 PROCEDURE — 99221 1ST HOSP IP/OBS SF/LOW 40: CPT | Performed by: NURSE PRACTITIONER

## 2020-11-09 PROCEDURE — 6370000000 HC RX 637 (ALT 250 FOR IP): Performed by: NURSE PRACTITIONER

## 2020-11-09 RX ORDER — CITALOPRAM 20 MG/1
10 TABLET ORAL DAILY
Status: DISCONTINUED | OUTPATIENT
Start: 2020-11-09 | End: 2020-11-12 | Stop reason: HOSPADM

## 2020-11-09 RX ORDER — FOLIC ACID 1 MG/1
1 TABLET ORAL DAILY
Status: DISCONTINUED | OUTPATIENT
Start: 2020-11-09 | End: 2020-11-12 | Stop reason: HOSPADM

## 2020-11-09 RX ORDER — M-VIT,TX,IRON,MINS/CALC/FOLIC 27MG-0.4MG
1 TABLET ORAL DAILY
Status: DISCONTINUED | OUTPATIENT
Start: 2020-11-09 | End: 2020-11-12 | Stop reason: HOSPADM

## 2020-11-09 RX ORDER — THIAMINE MONONITRATE (VIT B1) 100 MG
100 TABLET ORAL DAILY
Status: DISCONTINUED | OUTPATIENT
Start: 2020-11-09 | End: 2020-11-12 | Stop reason: HOSPADM

## 2020-11-09 RX ORDER — DIVALPROEX SODIUM 500 MG/1
500 TABLET, DELAYED RELEASE ORAL EVERY 12 HOURS SCHEDULED
Status: DISCONTINUED | OUTPATIENT
Start: 2020-11-09 | End: 2020-11-12 | Stop reason: HOSPADM

## 2020-11-09 RX ADMIN — DIVALPROEX SODIUM 500 MG: 250 TABLET, DELAYED RELEASE ORAL at 20:42

## 2020-11-09 RX ADMIN — CHLORDIAZEPOXIDE HYDROCHLORIDE 25 MG: 25 CAPSULE ORAL at 12:31

## 2020-11-09 RX ADMIN — ACETAMINOPHEN 650 MG: 325 TABLET ORAL at 09:48

## 2020-11-09 RX ADMIN — CHLORDIAZEPOXIDE HYDROCHLORIDE 25 MG: 25 CAPSULE ORAL at 20:42

## 2020-11-09 RX ADMIN — CITALOPRAM HYDROBROMIDE 10 MG: 20 TABLET ORAL at 16:17

## 2020-11-09 RX ADMIN — TRAZODONE HYDROCHLORIDE 50 MG: 50 TABLET ORAL at 20:42

## 2020-11-09 RX ADMIN — CHLORDIAZEPOXIDE HYDROCHLORIDE 25 MG: 25 CAPSULE ORAL at 16:18

## 2020-11-09 RX ADMIN — HYDROXYZINE PAMOATE 50 MG: 50 CAPSULE ORAL at 20:42

## 2020-11-09 RX ADMIN — Medication 100 MG: at 16:18

## 2020-11-09 RX ADMIN — DIVALPROEX SODIUM 250 MG: 250 TABLET, DELAYED RELEASE ORAL at 06:30

## 2020-11-09 RX ADMIN — FOLIC ACID 1 MG: 1 TABLET ORAL at 16:18

## 2020-11-09 RX ADMIN — CHLORDIAZEPOXIDE HYDROCHLORIDE 25 MG: 25 CAPSULE ORAL at 09:48

## 2020-11-09 RX ADMIN — MULTIPLE VITAMINS W/ MINERALS TAB 1 TABLET: TAB at 16:18

## 2020-11-09 ASSESSMENT — PAIN SCALES - GENERAL
PAINLEVEL_OUTOF10: 0
PAINLEVEL_OUTOF10: 0
PAINLEVEL_OUTOF10: 7

## 2020-11-09 ASSESSMENT — LIFESTYLE VARIABLES: HISTORY_ALCOHOL_USE: YES

## 2020-11-09 ASSESSMENT — SLEEP AND FATIGUE QUESTIONNAIRES
DO YOU USE A SLEEP AID: NO
DO YOU HAVE DIFFICULTY SLEEPING: YES
AVERAGE NUMBER OF SLEEP HOURS: 6
RESTFUL SLEEP: NO
DIFFICULTY STAYING ASLEEP: YES
DIFFICULTY FALLING ASLEEP: YES

## 2020-11-09 NOTE — PLAN OF CARE
Problem: Depressive Behavior With or Without Suicide Precautions:  Goal: Able to verbalize acceptance of life and situations over which he or she has no control  Description: Able to verbalize acceptance of life and situations over which he or she has no control  11/9/2020 0946 by Willie Mckenna RN  Outcome: Ongoing  11/9/2020 0228 by Rickey Segundo RN  Outcome: Ongoing  Goal: Able to verbalize and/or display a decrease in depressive symptoms  Description: Able to verbalize and/or display a decrease in depressive symptoms  11/9/2020 0946 by Willie Mckenna RN  Outcome: Ongoing  11/9/2020 0228 by Rickey Segundo RN  Outcome: Ongoing  Goal: Ability to disclose and discuss suicidal ideas will improve  Description: Ability to disclose and discuss suicidal ideas will improve  11/9/2020 0946 by Willie Mckenna RN  Outcome: Ongoing  11/9/2020 0228 by Rickey Segundo RN  Outcome: Ongoing     Problem: Suicide risk  Goal: Provide patient with safe environment  Description: Provide patient with safe environment  11/9/2020 0946 by Willie Mckenna RN  Outcome: Ongoing  11/9/2020 0228 by Rickey Segundo RN  Outcome: Met This Shift

## 2020-11-09 NOTE — PLAN OF CARE
Problem: Depressive Behavior With or Without Suicide Precautions:  Goal: Ability to disclose and discuss suicidal ideas will improve  Description: Ability to disclose and discuss suicidal ideas will improve  11/9/2020 1619 by Zari Johnson RN  Outcome: Met This Shift     Problem: Depressive Behavior With or Without Suicide Precautions:  Goal: Able to verbalize and/or display a decrease in depressive symptoms  Description: Able to verbalize and/or display a decrease in depressive symptoms  11/9/2020 1619 by Zari Johnson RN  Outcome: Ongoing   Pt has been withdrawn to his room and is awake in bed. Affect is sad on approach but he states that he is feeling better. He denies any depression and suicidal ideations but does admit to some anxiety of 6/10.
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
## 2020-11-09 NOTE — SUICIDE SAFETY PLAN
SAFETY PLAN    A suicide Safety Plan is a document that supports someone when they are having thoughts of suicide. Warning Signs that indicate a suicidal crisis may be developing: What (situations, thoughts, feelings, body sensations, behaviors, etc.) do you experience that lets you know you are beginning to think about suicide? 1. Swearing  2. shaking  3. nausea    Internal Coping Strategies:  What things can I do (relaxation techniques, hobbies, physical activities, etc.) to take my mind off my problems without contacting another person? 1. Deep breathing  2. meditation  3. People and social settings that provide distraction: Who can I call or where can I go to distract me? 1. Name: family  Phone:   2. Name: friends  Phone:   3. Place:           4. Place:     People whom I can ask for help: Who can I call when I need help - for example, friends, family, clergy, someone else? 1. Name: Stanleymatty Samir                Phone: [phone]  2. Name: True Olszewski  Phone: [phone]  3. Name:   Phone:     Professionals or [address] [place] [address] I can contact during a crisis: Who can I call for help - for example, my doctor, my psychiatrist, my psychologist, a mental health provider, a suicide hotline? 1. Clinician Name: Jefe Roberto   Phone:      Clinician Pager or Emergency Contact #:     2. Clinician Name: Dr. Juhi Dorsey   Phone:       Clinician Pager or Emergency Contact #:     3. Suicide Prevention Lifeline: 2-681-907-TALK (1589)    4. [address] [place] [address] Emergency Services -  for example, [facility] suicide hotline, [facility] Hotline:       Emergency Services Address:       Emergency Services Phone:     Making the environment safe: How can I make my environment (house/apartment/living space) safer? For example, can I remove guns, medications, and other items?   1.   2.
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
## 2020-11-09 NOTE — PLAN OF CARE
Patient resting quiet to self at this time, respirations are even and unlabored, no signs or symptoms of distress or discomfort. PRN medications given this shift for sleep and anxiety. Staff will continue to conduct environmental rounds to ensure the safety of everyone on the unit. Staff will provide support and interventions as requested or required.

## 2020-11-09 NOTE — PROGRESS NOTES
Patient denies thoughts to harm self or others at this time. Patient denies any hallucinations. Patient speech is pressured, patient is depressed and sad. Emotional support given. Patient is compliant with medications. Patient was encouraged to attend groups.  Behavior in control

## 2020-11-10 ENCOUNTER — CARE COORDINATION (OUTPATIENT)
Dept: OTHER | Facility: CLINIC | Age: 25
End: 2020-11-10

## 2020-11-10 PROCEDURE — 99231 SBSQ HOSP IP/OBS SF/LOW 25: CPT | Performed by: NURSE PRACTITIONER

## 2020-11-10 PROCEDURE — 6370000000 HC RX 637 (ALT 250 FOR IP): Performed by: NURSE PRACTITIONER

## 2020-11-10 PROCEDURE — 6370000000 HC RX 637 (ALT 250 FOR IP): Performed by: PSYCHIATRY & NEUROLOGY

## 2020-11-10 PROCEDURE — 1240000000 HC EMOTIONAL WELLNESS R&B

## 2020-11-10 RX ORDER — CHLORDIAZEPOXIDE HYDROCHLORIDE 25 MG/1
25 CAPSULE, GELATIN COATED ORAL EVERY 6 HOURS
Status: DISCONTINUED | OUTPATIENT
Start: 2020-11-10 | End: 2020-11-11

## 2020-11-10 RX ADMIN — NICOTINE POLACRILEX 2 MG: 2 LOZENGE ORAL at 08:33

## 2020-11-10 RX ADMIN — DIVALPROEX SODIUM 500 MG: 250 TABLET, DELAYED RELEASE ORAL at 08:31

## 2020-11-10 RX ADMIN — DIVALPROEX SODIUM 500 MG: 250 TABLET, DELAYED RELEASE ORAL at 21:25

## 2020-11-10 RX ADMIN — Medication 100 MG: at 08:31

## 2020-11-10 RX ADMIN — CITALOPRAM HYDROBROMIDE 10 MG: 20 TABLET ORAL at 08:31

## 2020-11-10 RX ADMIN — FOLIC ACID 1 MG: 1 TABLET ORAL at 08:31

## 2020-11-10 RX ADMIN — CHLORDIAZEPOXIDE HYDROCHLORIDE 25 MG: 25 CAPSULE ORAL at 14:25

## 2020-11-10 RX ADMIN — NICOTINE POLACRILEX 2 MG: 2 LOZENGE ORAL at 21:31

## 2020-11-10 RX ADMIN — ACETAMINOPHEN 650 MG: 325 TABLET ORAL at 08:37

## 2020-11-10 RX ADMIN — TRAZODONE HYDROCHLORIDE 50 MG: 50 TABLET ORAL at 21:25

## 2020-11-10 RX ADMIN — CHLORDIAZEPOXIDE HYDROCHLORIDE 25 MG: 25 CAPSULE ORAL at 08:31

## 2020-11-10 RX ADMIN — MULTIPLE VITAMINS W/ MINERALS TAB 1 TABLET: TAB at 08:31

## 2020-11-10 RX ADMIN — NICOTINE POLACRILEX 2 MG: 2 LOZENGE ORAL at 17:04

## 2020-11-10 RX ADMIN — ACETAMINOPHEN 650 MG: 325 TABLET ORAL at 21:25

## 2020-11-10 RX ADMIN — HYDROXYZINE PAMOATE 50 MG: 50 CAPSULE ORAL at 16:49

## 2020-11-10 RX ADMIN — CHLORDIAZEPOXIDE HYDROCHLORIDE 25 MG: 25 CAPSULE ORAL at 21:26

## 2020-11-10 ASSESSMENT — PAIN DESCRIPTION - ONSET: ONSET: ON-GOING

## 2020-11-10 ASSESSMENT — PAIN SCALES - GENERAL
PAINLEVEL_OUTOF10: 4
PAINLEVEL_OUTOF10: 2
PAINLEVEL_OUTOF10: 6
PAINLEVEL_OUTOF10: 0

## 2020-11-10 ASSESSMENT — PAIN DESCRIPTION - FREQUENCY: FREQUENCY: CONTINUOUS

## 2020-11-10 ASSESSMENT — PAIN DESCRIPTION - PROGRESSION: CLINICAL_PROGRESSION: GRADUALLY WORSENING

## 2020-11-10 ASSESSMENT — PAIN DESCRIPTION - DESCRIPTORS: DESCRIPTORS: DISCOMFORT;SORE

## 2020-11-10 ASSESSMENT — PAIN DESCRIPTION - LOCATION: LOCATION: BACK

## 2020-11-10 ASSESSMENT — PAIN DESCRIPTION - PAIN TYPE: TYPE: ACUTE PAIN

## 2020-11-10 ASSESSMENT — PAIN DESCRIPTION - ORIENTATION: ORIENTATION: LOWER

## 2020-11-10 NOTE — PROGRESS NOTES
Patient is resting quietly in bed with eyes closed at this time. No signs of distress or discomfort noted. No PRN medications given thus far. Safety needs met. No unit problems reported. Will continue to observe and support. (4) rarely moist

## 2020-11-10 NOTE — CARE COORDINATION
[facility] Transitions Initial Follow Up Call    Call within 2 business days of discharge: No Not discharge yet    Patient: Clau Martinez Patient : 1995   MRN: [id number]  Reason for Admission: Behavioral Health  Discharge Date:   RARS: Readmission Risk Score: 6      Last Discharge [address] [address]       Complaint Diagnosis Description Type Department Provider    20   Admission (Current) [facility] Sangeetha Turner MD           Patient's chart has been reviewed for Care Coordination needs. Care Transitions 24 Hour Call    Care Transitions Interventions         Follow Up  No future appointments.     Carin Peres RN

## 2020-11-10 NOTE — PROGRESS NOTES
Patient attended afternoon meet and greet. Patient updated on staffing and evening expectations. Patient 1 of 19 in attendance.

## 2020-11-10 NOTE — PLAN OF CARE
Problem: Depressive Behavior With or Without Suicide Precautions:  Goal: Able to verbalize acceptance of life and situations over which he or she has no control  Description: Able to verbalize acceptance of life and situations over which he or she has no control  Outcome: Ongoing  Goal: Able to verbalize and/or display a decrease in depressive symptoms  Description: Able to verbalize and/or display a decrease in depressive symptoms  11/10/2020 0454 by Nato Doss RN  Outcome: Ongoing  11/9/2020 1619 by Shirlene Sigala RN  Outcome: Ongoing

## 2020-11-11 PROCEDURE — 99231 SBSQ HOSP IP/OBS SF/LOW 25: CPT | Performed by: NURSE PRACTITIONER

## 2020-11-11 PROCEDURE — 6370000000 HC RX 637 (ALT 250 FOR IP): Performed by: PSYCHIATRY & NEUROLOGY

## 2020-11-11 PROCEDURE — 1240000000 HC EMOTIONAL WELLNESS R&B

## 2020-11-11 PROCEDURE — 6370000000 HC RX 637 (ALT 250 FOR IP): Performed by: NURSE PRACTITIONER

## 2020-11-11 RX ORDER — CHLORDIAZEPOXIDE HYDROCHLORIDE 25 MG/1
25 CAPSULE, GELATIN COATED ORAL EVERY 8 HOURS
Status: DISCONTINUED | OUTPATIENT
Start: 2020-11-11 | End: 2020-11-11

## 2020-11-11 RX ORDER — CHLORDIAZEPOXIDE HYDROCHLORIDE 25 MG/1
25 CAPSULE, GELATIN COATED ORAL EVERY 8 HOURS PRN
Status: DISCONTINUED | OUTPATIENT
Start: 2020-11-11 | End: 2020-11-12 | Stop reason: HOSPADM

## 2020-11-11 RX ADMIN — HYDROXYZINE PAMOATE 50 MG: 50 CAPSULE ORAL at 20:50

## 2020-11-11 RX ADMIN — HYDROXYZINE PAMOATE 50 MG: 50 CAPSULE ORAL at 08:44

## 2020-11-11 RX ADMIN — DIVALPROEX SODIUM 500 MG: 250 TABLET, DELAYED RELEASE ORAL at 08:44

## 2020-11-11 RX ADMIN — NICOTINE POLACRILEX 2 MG: 2 LOZENGE ORAL at 12:40

## 2020-11-11 RX ADMIN — NICOTINE POLACRILEX 4 MG: 2 GUM, CHEWING BUCCAL at 18:01

## 2020-11-11 RX ADMIN — CITALOPRAM HYDROBROMIDE 10 MG: 20 TABLET ORAL at 08:44

## 2020-11-11 RX ADMIN — FOLIC ACID 1 MG: 1 TABLET ORAL at 08:44

## 2020-11-11 RX ADMIN — CHLORDIAZEPOXIDE HYDROCHLORIDE 25 MG: 25 CAPSULE ORAL at 14:53

## 2020-11-11 RX ADMIN — DIVALPROEX SODIUM 500 MG: 250 TABLET, DELAYED RELEASE ORAL at 20:50

## 2020-11-11 RX ADMIN — Medication 100 MG: at 08:44

## 2020-11-11 RX ADMIN — MULTIPLE VITAMINS W/ MINERALS TAB 1 TABLET: TAB at 08:44

## 2020-11-11 RX ADMIN — NICOTINE POLACRILEX 2 MG: 2 LOZENGE ORAL at 10:04

## 2020-11-11 RX ADMIN — CHLORDIAZEPOXIDE HYDROCHLORIDE 25 MG: 25 CAPSULE ORAL at 22:58

## 2020-11-11 RX ADMIN — NICOTINE POLACRILEX 2 MG: 2 LOZENGE ORAL at 14:38

## 2020-11-11 RX ADMIN — CHLORDIAZEPOXIDE HYDROCHLORIDE 25 MG: 25 CAPSULE ORAL at 02:35

## 2020-11-11 RX ADMIN — TRAZODONE HYDROCHLORIDE 50 MG: 50 TABLET ORAL at 20:50

## 2020-11-11 RX ADMIN — NICOTINE POLACRILEX 4 MG: 2 GUM, CHEWING BUCCAL at 20:36

## 2020-11-11 RX ADMIN — CHLORDIAZEPOXIDE HYDROCHLORIDE 25 MG: 25 CAPSULE ORAL at 10:04

## 2020-11-11 ASSESSMENT — PAIN SCALES - GENERAL: PAINLEVEL_OUTOF10: 0

## 2020-11-11 NOTE — PROGRESS NOTES
Attended afternoon meet and greet. Updated on staffing changes. Pleasant and engaged in sports trivia. Patient 1 of 10 in attendance.

## 2020-11-11 NOTE — PROGRESS NOTES
Patient asking for PRN Librium, stating he is feeling withdrawal symptoms. Patient is having tremors, medicated per mar.

## 2020-11-11 NOTE — PROGRESS NOTES
various drinks depending upon what pt can get ahold of     Drug use: Not Currently    Sexual activity: Yes     Partners: Male   Lifestyle    Physical activity     Days per week: Not on file     Minutes per session: Not on file    Stress: Not on file   Relationships    Social connections     Talks on phone: Not on file     Gets together: Not on file     Attends [employer] service: Not on file     Active member of club or organization: Not on file     Attends meetings of clubs or organizations: Not on file     Relationship status: Not on file    Intimate partner violence     Fear of current or ex partner: Not on file     Emotionally abused: Not on file     Physically abused: Not on file     Forced sexual activity: Not on file   Other Topics Concern    Not on file   Social History Narrative    Not on file           ROS:  [x] All negative/unchanged except if checked.  Explain positive(checked items) below:  [] Constitutional  [] Eyes  [] Ear/Nose/Mouth/Throat  [] Respiratory  [] CV  [] GI  []   [] Musculoskeletal  [] Skin/Breast  [] Neurological  [] Endocrine  [] Heme/Lymph  [] Allergic/Immunologic    Explanation:     MEDICATIONS:    Current Facility-Administered Medications:     chlordiazePOXIDE (LIBRIUM) capsule 25 mg, 25 mg, Oral, C6G, Ellie B Treasure, APRN - CNP, 25 mg at 11/10/20 1425    divalproex (DEPAKOTE) DR tablet 500 mg, 500 mg, Oral, 2 times per day, Ritesh Oats, APRN - CNP, 730 mg at 11/10/20 0831    vitamin B-1 (THIAMINE) tablet 100 mg, 100 mg, Oral, Daily, Inge Marink, APRN - CNP, 519 mg at 11/10/20 0831    therapeutic multivitamin-minerals 1 tablet, 1 tablet, Oral, Daily, Inge Marink, APRN - CNP, 1 tablet at 21/08/58 6294    folic acid (FOLVITE) tablet 1 mg, 1 mg, Oral, Daily, Inge Dwayne Cartagenalick, APRN - CNP, 1 mg at 11/10/20 0831    citalopram (CELEXA) tablet 10 mg, 10 mg, Oral, Daily, Inge Dwayne Cartagenalick, APRN - CNP, 10 mg at 11/10/20 0831    acetaminophen (TYLENOL) tablet 650 mg, 650 mg, Oral, Q6H PRN, Karly Patel MD, 650 mg at 11/10/20 0837    hydrOXYzine (VISTARIL) capsule 50 mg, 50 mg, Oral, TID PRN, Karly Patel MD, 50 mg at 11/10/20 1649    haloperidol lactate (HALDOL) injection 5 mg, 5 mg, Intramuscular, Q6H PRN **OR** haloperidol (HALDOL) tablet 5 mg, 5 mg, Oral, Q6H PRN, Karly Patel MD    traZODone (DESYREL) tablet 50 mg, 50 mg, Oral, Nightly PRN, Karly Patel MD, 50 mg at 11/09/20 2042    magnesium hydroxide (MILK OF MAGNESIA) 400 MG/5ML suspension 30 mL, 30 mL, Oral, Daily PRN, Karly Patel MD    aluminum & magnesium hydroxide-simethicone (MAALOX) 200-200-20 MG/5ML suspension 30 mL, 30 mL, Oral, PRN, Karly Patel MD    nicotine polacrilex (COMMIT) lozenge 2 mg, 2 mg, Oral, PRN, Francy Marts Dellick, APRN - CNP, 2 mg at 11/10/20 1704      Examination:  BP (!) 125/58   Pulse 72   Temp 97.7 °F (36.5 °C)   Resp 16   Ht 6' 1\" (1.854 m)   Wt 143 lb 1 oz (64.9 kg)   SpO2 99%   BMI 18.87 kg/m²   Gait - steady  Medication side effects(SE): Denies    Mental Status Examination:    Level of consciousness:  within normal limits   Appearance:  fair grooming and fair hygiene  Behavior/Motor:  no abnormalities noted  Attitude toward examiner:  cooperative  Speech:  spontaneous   Mood: \" I feel tired. \"  Affect: Appropriate a little flat and blunted  Thought processes:  linear   Thought content: Devoid of any auditory visual hallucinations delusions or perceptual normalities. Denies SI/HI intent or plan  Cognition:  oriented to person, place, and time   Concentration intact  Insight improving  Judgement improving    ASSESSMENT:   Patient symptoms are:  [] Well controlled  [] Improving  [] Worsening  [x] No change      Diagnosis:   Principal Problem:    Bipolar 1 disorder, mixed ([facility]  Active Problems:    Alcohol dependence ([facility]    History of posttraumatic stress disorder (PTSD)    Cluster B personality traits ([facility]  Resolved Problems:    * No resolved hospital problems. *      LABS:    No results for input(s): WBC, HGB, PLT in the last 72 hours. No results for input(s): NA, K, CL, CO2, BUN, CREATININE, GLUCOSE in the last 72 hours. No results for input(s): BILITOT, ALKPHOS, AST, ALT in the last 72 hours. No results found for: Kelleen Nanny, LABBENZ, CANNAB, COCAINESCRN, LABMETH, [address], PHENCYCLIDINESCREENURINE, PPXUR, ETOH  Lab Results   Component Value Date    TSH 2.670 01/27/2017     No results found for: LITHIUM  No results found for: VALPROATE, CBMZ      Treatment Plan:  Reviewed current Medications with the patient. Risks, benefits, side effects, drug-to-drug interactions and alternatives to treatment were discussed. Collateral information:   CD evaluation  Encourage patient to attend group and other milieu activities.   Discharge planning discussed with the patient and treatment team.    Continue Depakote 500 mg twice daily for mood stabilization  Continue Celexa 10 mg daily for depression  Continue to titrate Librium to 25 mg every 8 hours tomorrow    PSYCHOTHERAPY/COUNSELING:  [x] Therapeutic interview  [x] Supportive  [] CBT  [] Ongoing  [] Other    [x] Patient continues to need, on a daily basis, active treatment furnished directly by or requiring the supervision of inpatient psychiatric personnel      Anticipated Length of stay: 3 to 5 days based on stability            Electronically signed by JUDE Burgos CNP on 82/42/9103 at 9:01 PM

## 2020-11-11 NOTE — PLAN OF CARE
Problem: Depressive Behavior With or Without Suicide Precautions:  Goal: Ability to disclose and discuss suicidal ideas will improve  Description: Ability to disclose and discuss suicidal ideas will improve  Outcome: Met This Shift     Problem: Depressive Behavior With or Without Suicide Precautions:  Goal: Able to verbalize and/or display a decrease in depressive symptoms  Description: Able to verbalize and/or display a decrease in depressive symptoms  11/11/2020 1725 by Abebe Croft RN  Outcome: Ongoing   Pt is out and about the unit and is social with select peers. He is pleasant and cooperative on approach and states that he is feeling so much better. He denies any depression and suicidal ideations and states that he is feeling hopeful. Voicing positive thoughts about going to rehab and staying away from alcohol.
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
## 2020-11-11 NOTE — PROGRESS NOTES
Jessica Zavala 44 NOTE     11/11/2020     Patient was seen and examined in person, Chart reviewed   Patient's case discussed with staff/team    Chief Complaint: \" I am tired from the Librium. \"    Interim History: Patient seen during treatment team.  States he still feels tired from taking the Librium. Patient denies any withdrawal symptoms currently per nursing his CIWA was a 1 this morning. Patient agrees to as needed Librium and will ask nursing if he feels like he is having any withdrawal symptoms. He denies SI/HI intent or plan. He denies any auditory visualizations he states that he feels psychiatrically stable when he is not drinking. He still wants to go to inpatient rehab at [facility]. Eating well sleeping well no neurovegetative signs of depression.     Appetite:   [x] Normal/Unchanged  [] Increased  [] Decreased      Sleep:       [x] Normal/Unchanged  [] Fair       [] Poor              Energy:    [x] Normal/Unchanged  [] Increased  [] Decreased        SI [] Present  [x] Absent    HI  []Present  [x] Absent     Aggression:  [] yes  [x] no    Patient is [x] able  [] unable to CONTRACT FOR SAFETY     PAST MEDICAL/PSYCHIATRIC HISTORY:   Past Medical History:   Diagnosis Date    Alcohol abuse     Anxiety     Depression        FAMILY/SOCIAL HISTORY:  Family History   Problem Relation Age of Onset    Depression Mother     Mental Illness Mother     Depression Father     Mental Illness Father      Social History     Socioeconomic History    Marital status: Single     Spouse name: Not on file    Number of children: 0    Years of education: 16    Highest education level: Not on file   Occupational History     Employer: UNEMPLOYED   Social Needs    Financial resource strain: Not on file    Food insecurity     Worry: Not on file     Inability: Not on file    Transportation needs     Medical: Not on file     Non-medical: Not on file   Tobacco Use    Smoking status: Never Smoker    Smokeless citalopram (CELEXA) tablet 10 mg, 10 mg, Oral, Daily, Territine Janes Amanda APRN - CNP, 10 mg at 11/11/20 0844    acetaminophen (TYLENOL) tablet 650 mg, 650 mg, Oral, Q6H PRN, Sherley Toth MD, 650 mg at 11/10/20 2125    hydrOXYzine (VISTARIL) capsule 50 mg, 50 mg, Oral, TID PRN, Sherley Toth MD, 50 mg at 11/11/20 0844    haloperidol lactate (HALDOL) injection 5 mg, 5 mg, Intramuscular, Q6H PRN **OR** haloperidol (HALDOL) tablet 5 mg, 5 mg, Oral, Q6H PRN, Sherley Toth MD    traZODone (DESYREL) tablet 50 mg, 50 mg, Oral, Nightly PRN, Sherley Toth MD, 50 mg at 11/10/20 2125    magnesium hydroxide (MILK OF MAGNESIA) 400 MG/5ML suspension 30 mL, 30 mL, Oral, Daily PRN, Sherley Toth MD    aluminum & magnesium hydroxide-simethicone (MAALOX) 200-200-20 MG/5ML suspension 30 mL, 30 mL, Oral, PRN, Sherley Toth MD    nicotine polacrilex (COMMIT) lozenge 2 mg, 2 mg, Oral, PRN, Pedro Amanda APRN - CNP, 2 mg at 11/11/20 1004      Examination:  BP (!) 120/59   Pulse 64   Temp 97.7 °F (36.5 °C)   Resp 16   Ht 6' 1\" (1.854 m)   Wt 143 lb 1 oz (64.9 kg)   SpO2 99%   BMI 18.87 kg/m²   Gait - steady  Medication side effects(SE): Denies    Mental Status Examination:    Level of consciousness:  within normal limits   Appearance:  fair grooming and fair hygiene  Behavior/Motor:  no abnormalities noted  Attitude toward examiner:  cooperative  Speech:  spontaneous   Mood: \" I feel tired. \"  Affect: Appropriate a little flat and blunted  Thought processes:  linear   Thought content: Devoid of any auditory visual hallucinations delusions or perceptual normalities.   Denies SI/HI intent or plan  Cognition:  oriented to person, place, and time   Concentration intact  Insight improving  Judgement improving    ASSESSMENT:   Patient symptoms are:  [] Well controlled  [] Improving  [] Worsening  [x] No change      Diagnosis:   Principal Problem:    Bipolar 1 disorder, mixed ([facility]  Active Problems:    Alcohol dependence

## 2020-11-11 NOTE — GROUP NOTE
Group Therapy Note    Date: 11/11/2020    Group Start Time: 1115  Group End Time: 1200  Group Topic: Cognitive Skills    [facility] 7SE ACUTE [facility] 1    JR Gabriel, [facility]    Number of participants: 12  Type of group: Cognitive Skills  Mode of intervention: Education, Support, Socialization, Exploration, Clarifying, and Problem-solving  Topic: Social support  Objective: To identify social support system    Group Therapy Note        Notes:  Pt was an active participant in cognitive skills group focusing on support systems and improving interpersonal relationships. Pt was able to share personal information and provide supportive feedback to group members. Status After Intervention:  Improved    Participation Level:  Active Listener and Interactive    Participation Quality: Appropriate, Attentive, Sharing and Supportive      Speech:  normal      Thought Process/Content: Logical      Affective Functioning: Congruent      Mood: euthymic      Level of consciousness:  Alert, Oriented x4 and Attentive      Response to Learning: Progressing to goal      Endings: None Reported    Modes of Intervention: Education, Support, Socialization, Exploration and Clarifying      Discipline Responsible: /Counselor      Signature:  JR Gabriel, [place]

## 2020-11-12 VITALS
BODY MASS INDEX: 18.96 KG/M2 | HEART RATE: 71 BPM | WEIGHT: 143.06 LBS | TEMPERATURE: 97.4 F | RESPIRATION RATE: 16 BRPM | SYSTOLIC BLOOD PRESSURE: 123 MMHG | HEIGHT: 73 IN | OXYGEN SATURATION: 100 % | DIASTOLIC BLOOD PRESSURE: 58 MMHG

## 2020-11-12 LAB — VALPROIC ACID LEVEL: 55 MCG/ML (ref 50–100)

## 2020-11-12 PROCEDURE — 6370000000 HC RX 637 (ALT 250 FOR IP): Performed by: PSYCHIATRY & NEUROLOGY

## 2020-11-12 PROCEDURE — 36415 COLL VENOUS BLD VENIPUNCTURE: CPT

## 2020-11-12 PROCEDURE — 80164 ASSAY DIPROPYLACETIC ACD TOT: CPT

## 2020-11-12 PROCEDURE — 99239 HOSP IP/OBS DSCHRG MGMT >30: CPT | Performed by: NURSE PRACTITIONER

## 2020-11-12 PROCEDURE — 6370000000 HC RX 637 (ALT 250 FOR IP): Performed by: NURSE PRACTITIONER

## 2020-11-12 RX ORDER — DIVALPROEX SODIUM 500 MG/1
500 TABLET, DELAYED RELEASE ORAL EVERY 12 HOURS SCHEDULED
Qty: 60 TABLET | Refills: 0 | Status: SHIPPED | OUTPATIENT
Start: 2020-11-12 | End: 2020-12-12

## 2020-11-12 RX ORDER — M-VIT,TX,IRON,MINS/CALC/FOLIC 27MG-0.4MG
1 TABLET ORAL DAILY
Qty: 30 TABLET | Refills: 0
Start: 2020-11-13 | End: 2020-12-13

## 2020-11-12 RX ORDER — LANOLIN ALCOHOL/MO/W.PET/CERES
100 CREAM (GRAM) TOPICAL DAILY
Qty: 30 TABLET | Refills: 0 | Status: SHIPPED | OUTPATIENT
Start: 2020-11-13 | End: 2020-12-13

## 2020-11-12 RX ORDER — FOLIC ACID 1 MG/1
1 TABLET ORAL DAILY
Qty: 30 TABLET | Refills: 0 | Status: SHIPPED | OUTPATIENT
Start: 2020-11-13 | End: 2020-12-13

## 2020-11-12 RX ORDER — CHLORDIAZEPOXIDE HYDROCHLORIDE 25 MG/1
25 CAPSULE, GELATIN COATED ORAL EVERY 8 HOURS PRN
Qty: 9 CAPSULE | Refills: 0 | Status: SHIPPED | OUTPATIENT
Start: 2020-11-12 | End: 2020-11-15

## 2020-11-12 RX ORDER — CITALOPRAM 10 MG/1
10 TABLET ORAL DAILY
Qty: 30 TABLET | Refills: 0 | Status: SHIPPED | OUTPATIENT
Start: 2020-11-13 | End: 2020-12-13

## 2020-11-12 RX ADMIN — NICOTINE POLACRILEX 4 MG: 2 GUM, CHEWING BUCCAL at 08:36

## 2020-11-12 RX ADMIN — NICOTINE POLACRILEX 4 MG: 2 GUM, CHEWING BUCCAL at 10:14

## 2020-11-12 RX ADMIN — HYDROXYZINE PAMOATE 50 MG: 50 CAPSULE ORAL at 12:00

## 2020-11-12 RX ADMIN — MULTIPLE VITAMINS W/ MINERALS TAB 1 TABLET: TAB at 08:36

## 2020-11-12 RX ADMIN — NICOTINE POLACRILEX 4 MG: 2 GUM, CHEWING BUCCAL at 12:31

## 2020-11-12 RX ADMIN — DIVALPROEX SODIUM 500 MG: 250 TABLET, DELAYED RELEASE ORAL at 08:36

## 2020-11-12 RX ADMIN — FOLIC ACID 1 MG: 1 TABLET ORAL at 08:36

## 2020-11-12 RX ADMIN — Medication 100 MG: at 08:36

## 2020-11-12 RX ADMIN — CITALOPRAM HYDROBROMIDE 10 MG: 20 TABLET ORAL at 08:36

## 2020-11-12 RX ADMIN — CHLORDIAZEPOXIDE HYDROCHLORIDE 25 MG: 25 CAPSULE ORAL at 08:36

## 2020-11-12 RX ADMIN — NICOTINE POLACRILEX 4 MG: 2 GUM, CHEWING BUCCAL at 15:06

## 2020-11-12 ASSESSMENT — PAIN SCALES - GENERAL: PAINLEVEL_OUTOF10: 0

## 2020-11-12 NOTE — PROGRESS NOTES
CLINICAL PHARMACY NOTE: MEDS TO [address] [place], [place] [address]Jimmy Fairly Select Patient?: No  Total # of Prescriptions Filled: 4   The following medications were delivered to the patient:  · Divalproex sodium 500 mg  · Citalopram hydrobromide 10 mg  · Folic acid 1 mg  · Chlordiazepoxide 25 mg  Total # of Interventions Completed: 3  Time Spent (min): 15    Additional Documentation:

## 2020-11-12 NOTE — PROGRESS NOTES
Spiritual Support Group Note    Number of Participants in Group:         7               Time:    1:15    Goal: Relief from isolation and loneliness             Leslie Sharing             Self-understanding and gain insight              Acceptance and belonging            Recognize they are not alone                Socialization             Empowerment       Encouragement    Topic:  [x] Spiritual Wellness and Self Care                  [] Hope                     [] Connecting with Divine/Others        [] Thankfulness and Gratitude               [x]  Meaningfulness and Purpose               [] Forgiveness               [x] Peace               [] Connect to [employer]      [] Other    Participation Level:   [x] Active Listener   [] Minimal   [] Monopolizing   [] Interactive   [] No Participation   []  Other:     Attention:   [] Alert   [] Distractible   [] Drowsy   [] Poor   [] Other:    Manner:   [x] Cooperative   [] Suspicious   [] Withdrawn   [] Guarded   [] Irritable   [] Inhospitable   [] Other:     Others Comments from Group:

## 2020-11-12 NOTE — PLAN OF CARE
Patient is pleasant and cooperative. Patient is no behavioral issues. However, patient judgement and insight remain poor. Patient notes that when he drinks he reports that he becomes suicidal and impulsive. Patient reports that this had been his only attempt. Patient denies SI, HI, and AVH. Patient denies depression and anxiety, however, he does present anxious at times. He is not isolative, but does not interact often with peers either. Patient denies intent to harm self. Patient does not he feels better. CIWA is 0. Will monitor closely.

## 2020-11-12 NOTE — SUICIDE SAFETY PLAN
SAFETY PLAN    A suicide Safety Plan is a document that supports someone when they are having thoughts of suicide. Warning Signs that indicate a suicidal crisis may be developing: What (situations, thoughts, feelings, body sensations, behaviors, etc.) do you experience that lets you know you are beginning to think about suicide? 1. Fleeting thought, happens within a half hour, has happened before, but it happens all the time  2. Usually only when I am drunk  3. Internal Coping Strategies:  What things can I do (relaxation techniques, hobbies, physical activities, etc.) to take my mind off my problems without contacting another person? 1. journalling  2. Listening to music  3. AA meetings    People and social settings that provide distraction: Who can I call or where can I go to distract me? 1. Name: Mom   Phone:   2. Name: dad  Phone:    3. Place: counselor            4. Place: sponsor    People whom I can ask for help: Who can I call when I need help - for example, friends, family, clergy, someone else? 1. Name: [employer]                Phone:   2. Name: Tami De Paz  Phone:   3. Name: Marie Lomax  Phone:     Professionals or [employer] agencies I can contact during a crisis: Who can I call for help - for example, my doctor, my psychiatrist, my psychologist, a mental health provider, a suicide hotline? 1. Clinician Name: Rex Blue   Phone:       Clinician Pager or Emergency Contact #:     2. Clinician Name: Dr. Lizbeth Dia   Phone: [employer] in [employer] Pager or Emergency Contact #:     3. Suicide Prevention Lifeline: 1-784-601-TALK (4792)    4. [address] [place] [address] Emergency Services -  for example, [facility] suicide hotline, [facility] Hotline: [facility]      Emergency Services Address:       Emergency Services Phone:     Making the environment safe: How can I make my environment (house/apartment/living space) safer?  For example, can I remove guns, medications, and other items? 1. No guns  2.  My mom has a safe to keep medication safe
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
## 2020-11-13 ENCOUNTER — CARE COORDINATION (OUTPATIENT)
Dept: OTHER | Facility: CLINIC | Age: 25
End: 2020-11-13
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
## 2020-11-19 ENCOUNTER — CARE COORDINATION (OUTPATIENT)
Dept: OTHER | Facility: CLINIC | Age: 25
End: 2020-11-19
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
## 2020-11-24 ENCOUNTER — CARE COORDINATION (OUTPATIENT)
Dept: OTHER | Facility: CLINIC | Age: 25
End: 2020-11-24

## 2020-11-24 NOTE — CARE COORDINATION
Ambulatory Care Coordination Note  11/24/2020  CM Risk Score: 0  Charlson 10 Year Mortality Risk Score: 2%     ACC: Jacek Moreno, RN    Summary Note: Called pt to follow up on progress, reinforce previous education/ provide pt education, and discuss any new issues or concerns. HIPAA compliant message left requesting a return phone call at patients convenience to discuss. Will continue to follow. Goals Addressed    None         Prior to Admission medications    Medication Sig Start Date End Date Taking? Authorizing Provider   citalopram (CELEXA) 10 MG tablet Take 1 tablet by mouth daily 11/13/20 74/78/14  Nadiyahine Girtommy, APRN - CNP   divalproex (DEPAKOTE) 500 MG DR tablet Take 1 tablet by mouth every 12 hours 11/12/20 35/91/21  Valerio Blank, APRN - CNP   folic acid (FOLVITE) 1 MG tablet Take 1 tablet by mouth daily 11/13/20 79/00/73  Valerio Blank, APRN - CNP   nicotine polacrilex (NICORETTE) 4 MG gum Take 1 each by mouth as needed for Smoking cessation 11/12/20 41/78/82  Valerio Blank, APRN - CNP   Multiple Vitamins-Minerals (THERAPEUTIC MULTIVITAMIN-MINERALS) tablet Take 1 tablet by mouth daily 11/13/20 16/92/79  Valerio Blank, APRN - CNP   vitamin B-1 100 MG tablet Take 1 tablet by mouth daily 11/13/20 99/35/34  Valerio Blank, APRN - CNP   naltrexone (DEPADE) 50 MG tablet Take 1 tablet by mouth daily 6/9/20   Geni Caraballo MD       No future appointments.

## 2020-12-11 ENCOUNTER — CARE COORDINATION (OUTPATIENT)
Dept: OTHER | Facility: CLINIC | Age: 25
End: 2020-12-11

## 2020-12-11 NOTE — CARE COORDINATION
Ambulatory Care Coordination Note  12/11/2020  CM Risk Score: 0  Charlson 10 Year Mortality Risk Score: 2%     ACC: Bjorn Sorenson RN    Summary Note: Called pt to follow up on progress, reinforce previous education/ provide pt education, and discuss any new issues or concerns. HIPAA compliant message left requesting a return phone call at patients convenience to discuss. Will continue to follow. Goals Addressed    None         Prior to Admission medications    Medication Sig Start Date End Date Taking? Authorizing Provider   citalopram (CELEXA) 10 MG tablet Take 1 tablet by mouth daily 11/13/20 04/20/82  JUDE Vergara CNP   divalproex (DEPAKOTE) 500 MG DR tablet Take 1 tablet by mouth every 12 hours 11/12/20 11/10/65  JUDE Vergara CNP   folic acid (FOLVITE) 1 MG tablet Take 1 tablet by mouth daily 11/13/20 85/41/18  JUDE Vergara CNP   nicotine polacrilex (NICORETTE) 4 MG gum Take 1 each by mouth as needed for Smoking cessation 11/12/20 24/22/85  JUDE Vergara CNP   Multiple Vitamins-Minerals (THERAPEUTIC MULTIVITAMIN-MINERALS) tablet Take 1 tablet by mouth daily 11/13/20 77/56/40  JUDE Vergara CNP   vitamin B-1 100 MG tablet Take 1 tablet by mouth daily 11/13/20 99/17/38  JUDE Vergara CNP   naltrexone (DEPADE) 50 MG tablet Take 1 tablet by mouth daily 6/9/20   Casi Hou MD       No future appointments.

## 2020-12-22 ENCOUNTER — CARE COORDINATION (OUTPATIENT)
Dept: OTHER | Facility: CLINIC | Age: 25
End: 2020-12-22

## 2020-12-22 NOTE — CARE COORDINATION
[facility] Transitions Follow Up Call    2020    Patient: Abby Marion  Patient : 1995   MRN: [id number]  Reason for Admission: Behavioral Health  Discharge Date: 20 RARS: Readmission Risk Score: 7       Called pt to follow up on progress, reinforce previous education/ provide pt education, and discuss any new issues or concerns. HIPAA compliant message left requesting a return phone call at patients convenience to discuss. Will continue to follow. Care Transitions Subsequent and Final Call    Subsequent and Final Calls  Care Transitions Interventions  Other Interventions: Follow Up  No future appointments.     Lena Elizondo RN

## 2020-12-29 ENCOUNTER — OFFICE VISIT (OUTPATIENT)
Dept: PRIMARY CARE CLINIC | Age: 25
End: 2020-12-29
Payer: COMMERCIAL

## 2020-12-29 VITALS
TEMPERATURE: 98.4 F | HEART RATE: 98 BPM | SYSTOLIC BLOOD PRESSURE: 112 MMHG | OXYGEN SATURATION: 100 % | DIASTOLIC BLOOD PRESSURE: 58 MMHG

## 2020-12-29 DIAGNOSIS — U07.1 COVID-19: ICD-10-CM

## 2020-12-29 PROCEDURE — 99213 OFFICE O/P EST LOW 20 MIN: CPT | Performed by: PHYSICIAN ASSISTANT

## 2020-12-29 NOTE — PROGRESS NOTES
Chief Complaint   Other (Pt needs negative to get back into sober living house) and Fatigue (PND)      History of Present Illness   Source of history provided by: patient. Iron Delgado is a 22 y.o. old male who has a past medical history of:   Past Medical History:   Diagnosis Date    Alcohol abuse     Anxiety     Depression    Presents to the flu clinic for clearance to return to a rehabilitation center after testing positive for COVID-19 on 12/13/2020. He states the center requires a negative test. Patient states his symptoms only included fatigue and mild postnasal drip. He reports his symptoms have improved overall since the start of illness. Denies any fever over the past 24 hours. Denies any loss of taste or smell, CP, dyspnea, LE edema, abdominal pain, vomiting, rash, or lethargy. Denies any hx of asthma or tobacco use. ROS   Pertinent positives and negatives are stated within HPI, all other systems reviewed and are negative. Surgical History:  has no past surgical history on file. Social History:  reports that he has never smoked. He has never used smokeless tobacco. He reports current alcohol use of about 70.0 - 90.0 standard drinks of alcohol per week. He reports previous drug use. Family History: family history includes Depression in his father and mother; Mental Illness in his father and mother. Allergies: Patient has no known allergies. Physical Exam      VS:  BP (!) 112/58 (Site: Right Upper Arm, Position: Sitting, Cuff Size: Large Adult)   Pulse 98   Temp 98.4 °F (36.9 °C) (Oral)   SpO2 100%    Oxygen Saturation Interpretation: Normal.    Constitutional:  Alert, development consistent with age. NAD. Head:  NC/NT. Airway patent. Mouth: Posterior pharynx with mild erythema and clear postnasal drip. No tonsillar hypertrophy or exudate. Neck:  Normal ROM. Supple. No anterior cervical adenopathy noted. Lungs: CTAB without wheezes, rales, or rhonchi.    CV:  Regular rate and rhythm, normal heart sounds, without pathological murmurs, ectopy, gallops, or rubs. Skin:  Normal turgor. Warm, dry, without visible rash. Lymphatic: No lymphangitis or adenopathy noted. Neurological:  Oriented. Motor functions intact. Lab / Imaging Results   (All laboratory and radiology results have been personally reviewed by myself)  Labs:  No results found for this visit on 12/29/20. Imaging: All Radiology results interpreted by Radiologist unless otherwise noted. No results found. Medical Decision Making   Pt non-toxic, in no apparent distress and stable at time of discharge. Assessment/Plan   Vera Khanna was seen today for other and fatigue. Diagnoses and all orders for this visit:    COVID-19  -     COVID-19; Future    Return to work evaluation      COVID-19 swab obtained and pending, will call with results once available. Patient is cleared to return to rehab regardless of test results as he meets the current CDC criteria. He is more than 10 days from the start of illness, his symptoms are improved overall, and he has been fever free for the past 24 hours. Patient advised to continue to increase fluids and rest. Schedule virtual f/u with PCP in 7-10 days if symptoms persist. ED sooner if symptoms worsen or change. ED immediately with high or refractory fever, progressive SOB, dyspnea, CP, calf pain/swelling, shaking chills, vomiting, abdominal pain, lethargy, flank pain, or decreased urinary output. Pt verbalizes understanding and is in agreement with plan of care. All questions answered. Gian Spencer PA-C    This visit was provided as a focused evaluation during the COVID -19 pandemic/national emergency. A comprehensive review of all previous patient history and testing was not conducted. Pertinent findings were elicited during the visit.

## 2020-12-29 NOTE — LETTER
[address] [place] [address]  [place], [address]   Phone: [phone]  Fax: [phone]    Imani Ness. Hansa Hoskins PA-C      12/29/2020     Patient: Daniel Parra   YOB: 1995       To Whom It May Concern: It is my medical opinion that Daniel Parra is permitted to reenter a rehabilitation center immediately. Although he tested positive for COVID-19 on 12/13/20, he meets these 3 criteria as outlined by CDC/[facility]:     a. No fever without the use of fever reducers for 24 hours  b. Improvement in symptoms  c. At least 10 days since the onset of symptoms    If you have any questions or concerns, please don't hesitate to call. Sincerely,        Imani Ness.  LUKASZ Spencer

## 2020-12-30 LAB — SARS-COV-2, PCR: NOT DETECTED

## 2021-01-05 ENCOUNTER — CARE COORDINATION (OUTPATIENT)
Dept: OTHER | Facility: CLINIC | Age: 26
End: 2021-01-05

## 2021-01-05 NOTE — CARE COORDINATION
Ambulatory Care Coordination Note  1/5/2021  CM Risk Score: 0  Charlson 10 Year Mortality Risk Score: 2%     ACC: Romaine Amador, RN    Summary Note: Called pt to follow up on progress, reinforce previous education/ provide pt education, and discuss any new issues or concerns. HIPAA compliant message left requesting a return phone call at patients convenience to discuss. Will continue to follow. Goals Addressed    None         Prior to Admission medications    Medication Sig Start Date End Date Taking? Authorizing Provider   citalopram (CELEXA) 10 MG tablet Take 1 tablet by mouth daily 11/13/20 39/23/24  JUDE Longo - CNP   divalproex (DEPAKOTE) 500 MG DR tablet Take 1 tablet by mouth every 12 hours 11/12/20 75/81/91  JUDE Longo - CNP   folic acid (FOLVITE) 1 MG tablet Take 1 tablet by mouth daily 11/13/20 27/68/33  JUDE Longo - CNP   nicotine polacrilex (NICORETTE) 4 MG gum Take 1 each by mouth as needed for Smoking cessation 11/12/20 63/36/93  Jesús Chamberlain APRN - CNP   Multiple Vitamins-Minerals (THERAPEUTIC MULTIVITAMIN-MINERALS) tablet Take 1 tablet by mouth daily 11/13/20 60/56/67  Jesús Chamberlain APRN - CNP   vitamin B-1 100 MG tablet Take 1 tablet by mouth daily 11/13/20 62/88/65  JUDE Longo - CNP   naltrexone (DEPADE) 50 MG tablet Take 1 tablet by mouth daily 6/9/20   Fabiola Mccarthy MD       No future appointments.

## 2021-01-22 ENCOUNTER — CARE COORDINATION (OUTPATIENT)
Dept: OTHER | Facility: CLINIC | Age: 26
End: 2021-01-22

## 2021-01-22 NOTE — CARE COORDINATION
Ambulatory Care Coordination Note  1/22/2021  CM Risk Score: 0  Charlson 10 Year Mortality Risk Score: 2%     ACC: Gómez Zaman RN    Summary Note: Resolving current episode for case management due to patient lost to follow up. Patient has not been reached after repeated calls and letters. Final call made today to attempt to contact and discreet message left on voicemail. Letter sent to patient notifying completion of services due to unable to reach. This writer's contact information and information regarding program services included in materials sent. Goals updated to reflect current status as appropriate. Will remain available should patient request re-initiation of case management or transitions of care services            Goals Addressed    None         Prior to Admission medications    Medication Sig Start Date End Date Taking? Authorizing Provider   citalopram (CELEXA) 10 MG tablet Take 1 tablet by mouth daily 11/13/20 22/33/67  JUDE Little CNP   divalproex (DEPAKOTE) 500 MG DR tablet Take 1 tablet by mouth every 12 hours 11/12/20 58/11/80  JUDE Little CNP   folic acid (FOLVITE) 1 MG tablet Take 1 tablet by mouth daily 11/13/20 51/49/69  JUDE Little CNP   nicotine polacrilex (NICORETTE) 4 MG gum Take 1 each by mouth as needed for Smoking cessation 11/12/20 14/72/52  JUDE Little CNP   Multiple Vitamins-Minerals (THERAPEUTIC MULTIVITAMIN-MINERALS) tablet Take 1 tablet by mouth daily 11/13/20 85/40/36  JUDE Little CNP   vitamin B-1 100 MG tablet Take 1 tablet by mouth daily 11/13/20 94/77/87  JUDE Little CNP   naltrexone (DEPADE) 50 MG tablet Take 1 tablet by mouth daily 6/9/20   Raulito Brito MD       No future appointments.

## 2023-03-09 ENCOUNTER — TELEPHONE (OUTPATIENT)
Dept: PRIMARY CARE | Facility: CLINIC | Age: 28
End: 2023-03-09
Payer: COMMERCIAL

## 2023-03-10 ENCOUNTER — APPOINTMENT (OUTPATIENT)
Dept: PRIMARY CARE | Facility: CLINIC | Age: 28
End: 2023-03-10
Payer: COMMERCIAL

## 2023-04-24 ENCOUNTER — APPOINTMENT (OUTPATIENT)
Dept: PRIMARY CARE | Facility: CLINIC | Age: 28
End: 2023-04-24
Payer: COMMERCIAL

## 2023-05-08 ENCOUNTER — APPOINTMENT (OUTPATIENT)
Dept: PRIMARY CARE | Facility: CLINIC | Age: 28
End: 2023-05-08
Payer: COMMERCIAL

## 2023-06-14 ENCOUNTER — APPOINTMENT (OUTPATIENT)
Dept: PRIMARY CARE | Facility: CLINIC | Age: 28
End: 2023-06-14
Payer: COMMERCIAL

## 2023-06-28 ENCOUNTER — APPOINTMENT (OUTPATIENT)
Dept: PRIMARY CARE | Facility: CLINIC | Age: 28
End: 2023-06-28
Payer: COMMERCIAL

## 2023-11-08 ENCOUNTER — LAB (OUTPATIENT)
Dept: LAB | Facility: LAB | Age: 28
End: 2023-11-08
Payer: COMMERCIAL

## 2023-11-08 ENCOUNTER — OFFICE VISIT (OUTPATIENT)
Dept: PRIMARY CARE | Facility: CLINIC | Age: 28
End: 2023-11-08
Payer: COMMERCIAL

## 2023-11-08 VITALS
BODY MASS INDEX: 17.36 KG/M2 | HEART RATE: 87 BPM | HEIGHT: 73 IN | RESPIRATION RATE: 16 BRPM | TEMPERATURE: 97.6 F | WEIGHT: 131 LBS | SYSTOLIC BLOOD PRESSURE: 112 MMHG | DIASTOLIC BLOOD PRESSURE: 76 MMHG | OXYGEN SATURATION: 97 %

## 2023-11-08 DIAGNOSIS — K21.9 GASTROESOPHAGEAL REFLUX DISEASE WITHOUT ESOPHAGITIS: ICD-10-CM

## 2023-11-08 DIAGNOSIS — L70.9 ADULT ACNE: ICD-10-CM

## 2023-11-08 DIAGNOSIS — Z00.00 HEALTHCARE MAINTENANCE: Primary | ICD-10-CM

## 2023-11-08 DIAGNOSIS — Z00.00 HEALTHCARE MAINTENANCE: ICD-10-CM

## 2023-11-08 LAB
25(OH)D3 SERPL-MCNC: 36 NG/ML (ref 30–100)
ALBUMIN SERPL BCP-MCNC: 4.3 G/DL (ref 3.4–5)
ALP SERPL-CCNC: 58 U/L (ref 33–120)
ALT SERPL W P-5'-P-CCNC: 18 U/L (ref 10–52)
ANION GAP SERPL CALC-SCNC: 11 MMOL/L (ref 10–20)
AST SERPL W P-5'-P-CCNC: 19 U/L (ref 9–39)
BILIRUB SERPL-MCNC: 1.4 MG/DL (ref 0–1.2)
BUN SERPL-MCNC: 10 MG/DL (ref 6–23)
CALCIUM SERPL-MCNC: 9.6 MG/DL (ref 8.6–10.3)
CHLORIDE SERPL-SCNC: 102 MMOL/L (ref 98–107)
CHOLEST SERPL-MCNC: 117 MG/DL (ref 0–199)
CHOLESTEROL/HDL RATIO: 2.4
CO2 SERPL-SCNC: 31 MMOL/L (ref 21–32)
CREAT SERPL-MCNC: 0.93 MG/DL (ref 0.5–1.3)
ERYTHROCYTE [DISTWIDTH] IN BLOOD BY AUTOMATED COUNT: 13.9 % (ref 11.5–14.5)
GFR SERPL CREATININE-BSD FRML MDRD: >90 ML/MIN/1.73M*2
GLUCOSE SERPL-MCNC: 81 MG/DL (ref 74–99)
HCT VFR BLD AUTO: 43.3 % (ref 41–52)
HDLC SERPL-MCNC: 49.5 MG/DL
HGB BLD-MCNC: 13.7 G/DL (ref 13.5–17.5)
IRON SATN MFR SERPL: 46 % (ref 25–45)
IRON SERPL-MCNC: 152 UG/DL (ref 35–150)
LDLC SERPL CALC-MCNC: 60 MG/DL
MCH RBC QN AUTO: 25.9 PG (ref 26–34)
MCHC RBC AUTO-ENTMCNC: 31.6 G/DL (ref 32–36)
MCV RBC AUTO: 82 FL (ref 80–100)
NON HDL CHOLESTEROL: 68 MG/DL (ref 0–149)
NRBC BLD-RTO: 0 /100 WBCS (ref 0–0)
PLATELET # BLD AUTO: 322 X10*3/UL (ref 150–450)
POTASSIUM SERPL-SCNC: 4.5 MMOL/L (ref 3.5–5.3)
PROT SERPL-MCNC: 6.7 G/DL (ref 6.4–8.2)
RBC # BLD AUTO: 5.29 X10*6/UL (ref 4.5–5.9)
SODIUM SERPL-SCNC: 139 MMOL/L (ref 136–145)
TIBC SERPL-MCNC: 329 UG/DL (ref 240–445)
TRIGL SERPL-MCNC: 36 MG/DL (ref 0–149)
TSH SERPL-ACNC: 1.43 MIU/L (ref 0.44–3.98)
UIBC SERPL-MCNC: 177 UG/DL (ref 110–370)
VLDL: 7 MG/DL (ref 0–40)
WBC # BLD AUTO: 5.4 X10*3/UL (ref 4.4–11.3)

## 2023-11-08 PROCEDURE — 99203 OFFICE O/P NEW LOW 30 MIN: CPT | Performed by: FAMILY MEDICINE

## 2023-11-08 PROCEDURE — 82306 VITAMIN D 25 HYDROXY: CPT

## 2023-11-08 PROCEDURE — 83550 IRON BINDING TEST: CPT

## 2023-11-08 PROCEDURE — 1036F TOBACCO NON-USER: CPT | Performed by: FAMILY MEDICINE

## 2023-11-08 PROCEDURE — 99385 PREV VISIT NEW AGE 18-39: CPT | Performed by: FAMILY MEDICINE

## 2023-11-08 PROCEDURE — 90686 IIV4 VACC NO PRSV 0.5 ML IM: CPT | Performed by: FAMILY MEDICINE

## 2023-11-08 PROCEDURE — 85027 COMPLETE CBC AUTOMATED: CPT

## 2023-11-08 PROCEDURE — 84443 ASSAY THYROID STIM HORMONE: CPT

## 2023-11-08 PROCEDURE — 80061 LIPID PANEL: CPT

## 2023-11-08 PROCEDURE — 80053 COMPREHEN METABOLIC PANEL: CPT

## 2023-11-08 PROCEDURE — 83540 ASSAY OF IRON: CPT

## 2023-11-08 PROCEDURE — 90471 IMMUNIZATION ADMIN: CPT | Performed by: FAMILY MEDICINE

## 2023-11-08 PROCEDURE — 36415 COLL VENOUS BLD VENIPUNCTURE: CPT

## 2023-11-08 RX ORDER — PANTOPRAZOLE SODIUM 40 MG/1
40 TABLET, DELAYED RELEASE ORAL DAILY
Qty: 30 TABLET | Refills: 2 | Status: SHIPPED | OUTPATIENT
Start: 2023-11-08 | End: 2024-05-08 | Stop reason: ALTCHOICE

## 2023-11-08 RX ORDER — CETIRIZINE HYDROCHLORIDE 10 MG/1
10 TABLET ORAL DAILY
COMMUNITY

## 2023-11-08 RX ORDER — PANTOPRAZOLE SODIUM 40 MG/1
40 TABLET, DELAYED RELEASE ORAL DAILY
Qty: 30 TABLET | Refills: 2 | Status: SHIPPED | OUTPATIENT
Start: 2023-11-08 | End: 2023-11-08 | Stop reason: SDUPTHER

## 2023-11-08 RX ORDER — FAMOTIDINE 10 MG/1
TABLET ORAL
COMMUNITY
End: 2023-11-08 | Stop reason: ALTCHOICE

## 2023-11-08 ASSESSMENT — PATIENT HEALTH QUESTIONNAIRE - PHQ9
1. LITTLE INTEREST OR PLEASURE IN DOING THINGS: NOT AT ALL
SUM OF ALL RESPONSES TO PHQ9 QUESTIONS 1 AND 2: 0
2. FEELING DOWN, DEPRESSED OR HOPELESS: NOT AT ALL

## 2023-11-08 NOTE — PROGRESS NOTES
"Subjective   Patient ID: Fracsico Reynoso is a 27 y.o. male who presents for Annual Exam.    Dentist and Eye Doctor appointments: due for eye doctor; saw dentist   Immunizations: due for flu; thinks utd with Tdap  Diet:   Exercise: Doesn't exercise a lot; swims in the summer  Supplements: MVI and occasionally fish oil  Sexually Active: No, has been in the past with male partner, no STD concern  Cancer Screening:    Prostate: N/A   Colon: N/A   Testicular: Home self exam   Skin:  wears sunscreen    HPI     Review of Systems    Objective   /76   Pulse 87   Temp 36.4 °C (97.6 °F)   Resp 16   Ht 1.854 m (6' 1\")   Wt 59.4 kg (131 lb)   SpO2 97%   BMI 17.28 kg/m²     Physical Exam  Constitutional:       General: He is not in acute distress.     Appearance: He is well-developed. He is not diaphoretic.   HENT:      Head: Normocephalic and atraumatic.      Right Ear: Tympanic membrane normal.      Left Ear: Tympanic membrane normal.      Nose: Nose normal.      Mouth/Throat:      Mouth: Mucous membranes are moist.   Eyes:      General: No scleral icterus.     Pupils: Pupils are equal, round, and reactive to light.   Neck:      Thyroid: No thyromegaly.      Vascular: No JVD.   Cardiovascular:      Rate and Rhythm: Normal rate and regular rhythm.      Heart sounds: Normal heart sounds. No murmur heard.     No friction rub. No gallop.   Pulmonary:      Effort: Pulmonary effort is normal. No respiratory distress.      Breath sounds: Normal breath sounds. No wheezing or rales.   Chest:      Chest wall: No tenderness.   Abdominal:      General: Bowel sounds are normal. There is no distension.      Palpations: Abdomen is soft. There is no mass.      Tenderness: There is no abdominal tenderness. There is no rebound.   Musculoskeletal:         General: Normal range of motion.      Cervical back: Normal range of motion and neck supple.   Lymphadenopathy:      Cervical: No cervical adenopathy.   Skin:     General: Skin is warm " and dry.   Neurological:      General: No focal deficit present.      Mental Status: He is alert and oriented to person, place, and time.      Deep Tendon Reflexes: Reflexes normal.   Psychiatric:         Mood and Affect: Mood normal.         Behavior: Behavior normal.         Assessment/Plan   Diagnoses and all orders for this visit:  Healthcare maintenance  -     CBC; Future  -     Comprehensive Metabolic Panel; Future  -     Lipid Panel; Future  -     Vitamin D 25 hydroxy; Future  -     TSH with reflex to Free T4 if abnormal; Future  Gastroesophageal reflux disease without esophagitis  -     pantoprazole (ProtoNix) 40 mg EC tablet; Take 1 tablet (40 mg) by mouth once daily. Do not crush, chew, or split.  -     Iron and TIBC; Future  Adult acne  -     Referral to Dermatology  Other orders  -     Flu vaccine (IIV4) age 6 months and greater, preservative free

## 2023-11-08 NOTE — PATIENT INSTRUCTIONS
Welcome to our practice!    Today we performed your Annual Physical Exam.  Your preventative health care, labs and vaccinations have been reviewed and are up to date.      You were given a seasonal Flu vaccine today     COVID 19 Vaccination is HIGHLY recommend in order to reduce both person and community transmission and death.      For your gerd we will try ppi for 3 months and then stop. If no improvement or symptoms return then we will have you see GI    Follow up in 1 year for your Complete Physical Exam

## 2024-05-08 ENCOUNTER — OFFICE VISIT (OUTPATIENT)
Dept: PRIMARY CARE | Facility: CLINIC | Age: 29
End: 2024-05-08
Payer: COMMERCIAL

## 2024-05-08 ENCOUNTER — LAB (OUTPATIENT)
Dept: LAB | Facility: LAB | Age: 29
End: 2024-05-08
Payer: COMMERCIAL

## 2024-05-08 ENCOUNTER — HOSPITAL ENCOUNTER (OUTPATIENT)
Dept: RADIOLOGY | Facility: CLINIC | Age: 29
Discharge: HOME | End: 2024-05-08
Payer: COMMERCIAL

## 2024-05-08 VITALS
DIASTOLIC BLOOD PRESSURE: 62 MMHG | RESPIRATION RATE: 16 BRPM | SYSTOLIC BLOOD PRESSURE: 116 MMHG | TEMPERATURE: 98 F | HEART RATE: 78 BPM | WEIGHT: 131 LBS | BODY MASS INDEX: 17.28 KG/M2

## 2024-05-08 DIAGNOSIS — M25.562 ACUTE PAIN OF BOTH KNEES: ICD-10-CM

## 2024-05-08 DIAGNOSIS — M25.561 ACUTE PAIN OF BOTH KNEES: ICD-10-CM

## 2024-05-08 DIAGNOSIS — M24.9 KNEE JOINT HYPERMOBILITY: ICD-10-CM

## 2024-05-08 DIAGNOSIS — M25.50 ARTHRALGIA, UNSPECIFIED JOINT: Primary | ICD-10-CM

## 2024-05-08 DIAGNOSIS — M25.50 ARTHRALGIA, UNSPECIFIED JOINT: ICD-10-CM

## 2024-05-08 LAB
ERYTHROCYTE [SEDIMENTATION RATE] IN BLOOD BY WESTERGREN METHOD: 2 MM/H (ref 0–15)
RHEUMATOID FACT SER NEPH-ACNC: <10 IU/ML (ref 0–15)

## 2024-05-08 PROCEDURE — 1036F TOBACCO NON-USER: CPT | Performed by: FAMILY MEDICINE

## 2024-05-08 PROCEDURE — 86431 RHEUMATOID FACTOR QUANT: CPT

## 2024-05-08 PROCEDURE — 85652 RBC SED RATE AUTOMATED: CPT

## 2024-05-08 PROCEDURE — 36415 COLL VENOUS BLD VENIPUNCTURE: CPT

## 2024-05-08 PROCEDURE — 86038 ANTINUCLEAR ANTIBODIES: CPT

## 2024-05-08 PROCEDURE — 99214 OFFICE O/P EST MOD 30 MIN: CPT | Performed by: FAMILY MEDICINE

## 2024-05-08 PROCEDURE — 73560 X-RAY EXAM OF KNEE 1 OR 2: CPT | Mod: BILATERAL PROCEDURE | Performed by: STUDENT IN AN ORGANIZED HEALTH CARE EDUCATION/TRAINING PROGRAM

## 2024-05-08 PROCEDURE — 73560 X-RAY EXAM OF KNEE 1 OR 2: CPT | Mod: 50

## 2024-05-08 RX ORDER — MELOXICAM 15 MG/1
15 TABLET ORAL DAILY
Qty: 30 TABLET | Refills: 0 | Status: SHIPPED | OUTPATIENT
Start: 2024-05-08 | End: 2025-05-08

## 2024-05-08 NOTE — PROGRESS NOTES
Subjective   Patient ID: Fracisco Reynoso is a 28 y.o. male who presents for Knee Pain (Bilateral knee pain onset 1 week ago ).    Last week both of his knees started hurting. Mostly he  states its the front of the knees.  When he woke up in the morning they were stiff, not swollen or hot but they felt warm.  No redness, no bruising.  It is allergy season and so his allergies have been acting up and he takes a daily zyrtec for that.  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 Monday morning they felt super stiff and were cracking. They weren't giving out on him and no weakness at all.  Last night he felt like his thighs were burning on both sides when he went to bed.  He tried tylenol.  He took an ibuprofen Monday night when the pain was the worst.      No one in his family with lupus or rheumatoid arthritis.      Knee Pain          Review of Systems    Objective   /62   Pulse 78   Temp 36.7 °C (98 °F)   Resp 16   Wt 59.4 kg (131 lb)   BMI 17.28 kg/m²     Physical Exam  Constitutional:       Appearance: Normal appearance.   HENT:      Head: Normocephalic and atraumatic.   Musculoskeletal:         General: Deformity (right patella is not as aligned as left and is also less mobile) present. No swelling or tenderness.      Cervical back: Neck supple.      Comments: Negative anterior/posterior drawer sign  Negative maite's test  There is hyperextension of the knee joint when patient stands  Questionable short leg?    Skin:     General: Skin is warm and dry.      Findings: No erythema or rash.   Neurological:      General: No focal deficit present.      Mental Status: He is alert and oriented to person, place, and time.      Sensory: No sensory deficit.      Deep Tendon Reflexes: Reflexes normal.   Psychiatric:         Mood and Affect: Mood normal.         Behavior: Behavior normal.         Assessment/Plan   Diagnoses and all orders for this visit:  Arthralgia,  unspecified joint  -     Sedimentation Rate; Future  -     ELVIRA with Reflex to CHILO; Future  -     Rheumatoid factor; Future  -     Referral to Physical Therapy; Future  -     meloxicam (Mobic) 15 mg tablet; Take 1 tablet (15 mg) by mouth once daily.  Acute pain of both knees  -     XR knees anteroposterior standing bilateral; Future  -     Referral to Physical Therapy; Future  -     XR knee 1-2 views bilateral; Future  Knee joint hypermobility

## 2024-05-08 NOTE — PATIENT INSTRUCTIONS
Today we have addressed your knee pain and hypermobility  I have ordered xrays, recommend that we check rheumatology labs and I have referred you to PT  I offered meloxicam or you can take advil otc for discomfort    Follow up when results received.  Follow up if no improvement or if symptoms worsen.

## 2024-05-14 LAB — ANA SER QL HEP2 SUBST: NEGATIVE
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
## 2024-07-09 ENCOUNTER — LAB (OUTPATIENT)
Dept: LAB | Facility: LAB | Age: 29
End: 2024-07-09
Payer: COMMERCIAL

## 2024-07-09 ENCOUNTER — OFFICE VISIT (OUTPATIENT)
Dept: PRIMARY CARE | Facility: CLINIC | Age: 29
End: 2024-07-09
Payer: COMMERCIAL

## 2024-07-09 ENCOUNTER — PATIENT MESSAGE (OUTPATIENT)
Dept: PRIMARY CARE | Facility: CLINIC | Age: 29
End: 2024-07-09

## 2024-07-09 ENCOUNTER — HOSPITAL ENCOUNTER (OUTPATIENT)
Dept: RADIOLOGY | Facility: CLINIC | Age: 29
Discharge: HOME | End: 2024-07-09
Payer: COMMERCIAL

## 2024-07-09 VITALS
WEIGHT: 138 LBS | BODY MASS INDEX: 18.21 KG/M2 | HEART RATE: 95 BPM | RESPIRATION RATE: 16 BRPM | DIASTOLIC BLOOD PRESSURE: 68 MMHG | TEMPERATURE: 98.7 F | SYSTOLIC BLOOD PRESSURE: 124 MMHG

## 2024-07-09 DIAGNOSIS — N50.812 LEFT TESTICULAR PAIN: ICD-10-CM

## 2024-07-09 DIAGNOSIS — N50.812 LEFT TESTICULAR PAIN: Primary | ICD-10-CM

## 2024-07-09 DIAGNOSIS — N50.89 MASS OF LEFT TESTICLE: Primary | ICD-10-CM

## 2024-07-09 DIAGNOSIS — N50.89 MASS OF LEFT TESTICLE: ICD-10-CM

## 2024-07-09 LAB
B-HCG SERPL-ACNC: 475 MIU/ML
LDH SERPL L TO P-CCNC: 128 U/L (ref 84–246)

## 2024-07-09 PROCEDURE — 82105 ALPHA-FETOPROTEIN SERUM: CPT

## 2024-07-09 PROCEDURE — 1036F TOBACCO NON-USER: CPT | Performed by: FAMILY MEDICINE

## 2024-07-09 PROCEDURE — 83615 LACTATE (LD) (LDH) ENZYME: CPT

## 2024-07-09 PROCEDURE — 84702 CHORIONIC GONADOTROPIN TEST: CPT

## 2024-07-09 PROCEDURE — 99213 OFFICE O/P EST LOW 20 MIN: CPT | Performed by: FAMILY MEDICINE

## 2024-07-09 PROCEDURE — 36415 COLL VENOUS BLD VENIPUNCTURE: CPT

## 2024-07-09 PROCEDURE — 76870 US EXAM SCROTUM: CPT

## 2024-07-09 PROCEDURE — 76870 US EXAM SCROTUM: CPT | Performed by: RADIOLOGY

## 2024-07-09 PROCEDURE — 93976 VASCULAR STUDY: CPT | Performed by: RADIOLOGY

## 2024-07-09 NOTE — PROGRESS NOTES
Subjective   Patient ID: Fracisco Reynoso is a 28 y.o. male who presents for Groin Pain (Onset yesterday ).    Yesterday he was working and had a sudden dull pain in the left testicle.  He did a self exam and the left side feels harder and firmer.  It feels sensitive.  It feels like it's a little retracted towards the body.  No discoloration on the outside.  The left testicle was undescended when he was 13 and they had to do surgery to pull it down.  He was sitting in an [employer] training yesterday.      Groin Pain         Review of Systems    Objective   /68   Pulse 95   Temp 37.1 °C (98.7 °F)   Resp 16   Wt 62.6 kg (138 lb)   BMI 18.21 kg/m²     Physical Exam  Constitutional:       Appearance: Normal appearance.   Abdominal:      Hernia: There is no hernia in the left inguinal area or right inguinal area.   Genitourinary:     Penis: Normal.       Epididymis:      Right: Normal.      Left: Normal.          Comments: Left superior testicle with a hard, palpable lump, non tender, testicle is not retracted   Skin:     General: Skin is warm and dry.      Capillary Refill: Capillary refill takes less than 2 seconds.   Neurological:      General: No focal deficit present.      Mental Status: He is alert and oriented to person, place, and time.   Psychiatric:         Mood and Affect: Mood normal.         Behavior: Behavior normal.         Assessment/Plan   Diagnoses and all orders for this visit:  Left testicular pain  -     US scrotum; Future  Mass of left testicle

## 2024-07-09 NOTE — PATIENT INSTRUCTIONS
Today we have addressed your left testicular pain and mass  I have ordered a STAT ultrasound of the scrotum to be done today to confirm that there is no torsion and to identify the makeup of the mass    Follow up when results received.

## 2024-07-10 LAB — AFP SERPL-MCNC: 165 NG/ML (ref 0–9)

## 2024-07-12 ENCOUNTER — APPOINTMENT (OUTPATIENT)
Dept: PRIMARY CARE | Facility: CLINIC | Age: 29
End: 2024-07-12
Payer: COMMERCIAL

## 2024-07-18 ENCOUNTER — APPOINTMENT (OUTPATIENT)
Dept: UROLOGY | Facility: CLINIC | Age: 29
End: 2024-07-18
Payer: COMMERCIAL

## 2024-07-18 VITALS
TEMPERATURE: 98.7 F | HEART RATE: 87 BPM | HEIGHT: 73 IN | WEIGHT: 140 LBS | DIASTOLIC BLOOD PRESSURE: 77 MMHG | BODY MASS INDEX: 18.55 KG/M2 | SYSTOLIC BLOOD PRESSURE: 127 MMHG

## 2024-07-18 DIAGNOSIS — N50.89 MASS OF LEFT TESTICLE: Primary | ICD-10-CM

## 2024-07-18 PROCEDURE — 99205 OFFICE O/P NEW HI 60 MIN: CPT | Performed by: UROLOGY

## 2024-07-18 PROCEDURE — 3008F BODY MASS INDEX DOCD: CPT | Performed by: UROLOGY

## 2024-07-18 RX ORDER — CEFAZOLIN SODIUM 2 G/100ML
2 INJECTION, SOLUTION INTRAVENOUS ONCE
OUTPATIENT
Start: 2024-07-18 | End: 2024-07-18

## 2024-07-18 RX ORDER — FAMOTIDINE 20 MG/1
20 TABLET, FILM COATED ORAL AS NEEDED
COMMUNITY

## 2024-07-18 RX ORDER — SODIUM CHLORIDE 9 MG/ML
100 INJECTION, SOLUTION INTRAVENOUS CONTINUOUS
OUTPATIENT
Start: 2024-07-18

## 2024-07-18 NOTE — PROGRESS NOTES
Subjective   Patient ID: Fracisco Reynoso is a 28 y.o. male new patient kindly referred by Dr. Maren Salazar who presents for Testicular Mass (Left).    HPI  The patient is accompanied by his mother Verónica.    The patient relates that a week ago Monday (July 8) he started having pain in left testicle. At that time he noticed the left testicle was harder than the right testicle. He was sent for imaging and labs. Since the onset of symptoms, the left testicle has become twice the size of the right.     PMH is negative. Patient is in recovery and would like to avoid narcotic pain management. 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     PSH includes undescended testicle at 11 or 12 years old by Dr. Chong    Patient is single and not sexually active. He does not have any children but would like to be a Father one day.   Patient works at an eye clinic and in a restaurant.     Scrotal US Results  IMPRESSION:  Bilateral testicular microlithiasis. There are also several  nonspecific larger or more coarse calcifications in the left testicle  measuring up to 3 mm in diameter.  Small hydrocele on the right.  There are 2 suspicious lesions in the left testicle, the larger in  the upper pole measuring 20 x 19 x 24 mm. Testicular neoplasms to be  excluded, such as embryonal cell carcinoma and seminoma.    Lactate dehydrogenase  Component  Ref Range & Units 9 d ago   LDH  84 - 246 U/L 128     Human Chorionic Gonadotropin  Component  Ref Range & Units 9 d ago   HCG, Beta-Quantitative  <5 mIU/mL 475 High      Alpha-Fetoprotein  Component  Ref Range & Units 9 d ago   Alpha-Fetoprotein  0 - 9 ng/mL 165 High        Past Medical History  No past medical history on file.     Surgical History  Past Surgical History:   Procedure Laterality Date    UNDESCENDED TESTICLE EXPLORATION      WISDOM TOOTH EXTRACTION           Social History  He reports that he has quit smoking. His smoking use included cigarettes. He has never used smokeless tobacco. He reports that he  does not drink alcohol and does not use drugs.    Family History  Family History   Problem Relation Name Age of Onset    Anxiety disorder Mother      Obesity Mother      Depression Mother      No Known Problems Father      No Known Problems Brother         Medications    Current Outpatient Medications:     cetirizine (ZyrTEC) 10 mg tablet, Take 1 tablet (10 mg) by mouth if needed., Disp: , Rfl:     famotidine (Pepcid) 20 mg tablet, Take 1 tablet (20 mg) by mouth if needed for heartburn., Disp: , Rfl:     meloxicam (Mobic) 15 mg tablet, Take 1 tablet (15 mg) by mouth once daily., Disp: 30 tablet, Rfl: 0     Allergies  Patient has no known allergies.     Review of Systems  A 12 system review was completed and is negative with the exception of those signs and symptoms noted in the history of present illness.    Objective   Physical Exam  General: in NAD, appears stated age  Head: normocephalic, atraumatic  Neck: supple; trachea is midline  Respiratory: normal effort, no use of accessory muscles  Cardiovascular: no peripheral edema  Abdomen: soft, nondistended, nontender, no rebound or guarding, no organomegaly, no CVA tenderness, no hernia  Lymphatic: no lymphadenopathy noted  Skin: normal turgor, no rashes  Neurologic: grossly intact, oriented to person/place/time  Psychiatric: mode and affect appropriate  : normal phallus, normal meatus, scrotum normal, testicles normal bilaterally, epididymis normal bilaterally    Assessment/Plan   Problem List Items Addressed This Visit             ICD-10-CM    Mass of left testicle - Primary N50.89    Relevant Orders    HIV 1/2 Antigen/Antibody Screen with Reflex to Confirmation    Hepatitis C Antibody    Hepatitis B Surface Antibody    Syphilis Screen with Reflex    C. trachomatis + N. gonorrhoeae, Amplified    HTLV 1/2 Antibodies    Cytomegalovirus Antibody, IgM    Cytomegalovirus IgG    POCT Sperm Freeze    Case Request Operating Room: Orchiectomy Radical (Completed)    Urine  Culture    Basic Metabolic Panel    CBC    ECG 12 lead    Request for Pre-Admission Testing Visit    CT chest abdomen pelvis w IV contrast     We discussed the diagnosis of testicular cancer. I explained the treatment plan of orchiectomy. We discussed the risks, benefits, and alternatives to the procedure. We also discussed the postop care and recovery expectations. We discussed the banking sperm in order to protect against potential future fertility concerns. The patient would like to bank his sperm. We discussed testicular prosthesis. The patient declined prosthesis. Patient is in recovery and would like to avoid narcotic pain management. 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 I assured him we can protect his sobriety.     Order C/A/P w contrast stat. Set up labs for sperm banking. Schedule left radical Orchiectomy.     Scribe Attestation  By signing my name below, I, Chuyita Syed , Scribe   attest that this documentation has been prepared under the direction and in the presence of Mateusz Catherine MD.

## 2024-07-19 ENCOUNTER — HOSPITAL ENCOUNTER (OUTPATIENT)
Dept: RADIOLOGY | Facility: CLINIC | Age: 29
Discharge: HOME | End: 2024-07-19
Payer: COMMERCIAL

## 2024-07-19 DIAGNOSIS — N50.89 MASS OF LEFT TESTICLE: ICD-10-CM

## 2024-07-19 PROCEDURE — 74177 CT ABD & PELVIS W/CONTRAST: CPT

## 2024-07-19 PROCEDURE — 2550000001 HC RX 255 CONTRASTS: Performed by: UROLOGY

## 2024-07-22 ENCOUNTER — PRE-ADMISSION TESTING (OUTPATIENT)
Dept: PREADMISSION TESTING | Facility: HOSPITAL | Age: 29
End: 2024-07-22
Payer: COMMERCIAL

## 2024-07-22 ENCOUNTER — LAB (OUTPATIENT)
Dept: LAB | Facility: LAB | Age: 29
End: 2024-07-22
Payer: COMMERCIAL

## 2024-07-22 VITALS
SYSTOLIC BLOOD PRESSURE: 123 MMHG | OXYGEN SATURATION: 99 % | WEIGHT: 140.43 LBS | BODY MASS INDEX: 18.61 KG/M2 | DIASTOLIC BLOOD PRESSURE: 70 MMHG | HEART RATE: 69 BPM | RESPIRATION RATE: 16 BRPM | HEIGHT: 73 IN | TEMPERATURE: 96.8 F

## 2024-07-22 DIAGNOSIS — N50.89 MASS OF LEFT TESTICLE: Primary | ICD-10-CM

## 2024-07-22 DIAGNOSIS — N50.89 MASS OF LEFT TESTICLE: ICD-10-CM

## 2024-07-22 DIAGNOSIS — Z01.818 PREOP EXAMINATION: ICD-10-CM

## 2024-07-22 LAB
ANION GAP SERPL CALC-SCNC: 11 MMOL/L (ref 10–20)
ATRIAL RATE: 68 BPM
BUN SERPL-MCNC: 8 MG/DL (ref 6–23)
CALCIUM SERPL-MCNC: 9.3 MG/DL (ref 8.6–10.3)
CHLORIDE SERPL-SCNC: 98 MMOL/L (ref 98–107)
CMV IGG AVIDITY SERPL IA-RTO: NONREACTIVE %
CO2 SERPL-SCNC: 29 MMOL/L (ref 21–32)
CREAT SERPL-MCNC: 0.94 MG/DL (ref 0.5–1.3)
EGFRCR SERPLBLD CKD-EPI 2021: >90 ML/MIN/1.73M*2
ERYTHROCYTE [DISTWIDTH] IN BLOOD BY AUTOMATED COUNT: 14.4 % (ref 11.5–14.5)
GLUCOSE SERPL-MCNC: 82 MG/DL (ref 74–99)
HBV SURFACE AB SER-ACNC: <3.1 MIU/ML
HCT VFR BLD AUTO: 47.4 % (ref 41–52)
HCV AB SER QL: NONREACTIVE
HGB BLD-MCNC: 14.9 G/DL (ref 13.5–17.5)
HIV 1+2 AB+HIV1 P24 AG SERPL QL IA: NONREACTIVE
MCH RBC QN AUTO: 26 PG (ref 26–34)
MCHC RBC AUTO-ENTMCNC: 31.4 G/DL (ref 32–36)
MCV RBC AUTO: 83 FL (ref 80–100)
NRBC BLD-RTO: 0 /100 WBCS (ref 0–0)
P AXIS: 17 DEGREES
P OFFSET: 181 MS
P ONSET: 145 MS
PLATELET # BLD AUTO: 352 X10*3/UL (ref 150–450)
POTASSIUM SERPL-SCNC: 4.3 MMOL/L (ref 3.5–5.3)
PR INTERVAL: 142 MS
Q ONSET: 216 MS
QRS COUNT: 11 BEATS
QRS DURATION: 90 MS
QT INTERVAL: 346 MS
QTC CALCULATION(BAZETT): 367 MS
QTC FREDERICIA: 360 MS
R AXIS: 89 DEGREES
RBC # BLD AUTO: 5.74 X10*6/UL (ref 4.5–5.9)
SODIUM SERPL-SCNC: 134 MMOL/L (ref 136–145)
T AXIS: 73 DEGREES
T OFFSET: 389 MS
TREPONEMA PALLIDUM IGG+IGM AB [PRESENCE] IN SERUM OR PLASMA BY IMMUNOASSAY: NONREACTIVE
VENTRICULAR RATE: 68 BPM
WBC # BLD AUTO: 7.8 X10*3/UL (ref 4.4–11.3)

## 2024-07-22 PROCEDURE — 85027 COMPLETE CBC AUTOMATED: CPT

## 2024-07-22 PROCEDURE — 86706 HEP B SURFACE ANTIBODY: CPT

## 2024-07-22 PROCEDURE — 36415 COLL VENOUS BLD VENIPUNCTURE: CPT

## 2024-07-22 PROCEDURE — 93010 ELECTROCARDIOGRAM REPORT: CPT | Performed by: INTERNAL MEDICINE

## 2024-07-22 PROCEDURE — 86790 VIRUS ANTIBODY NOS: CPT

## 2024-07-22 PROCEDURE — 87491 CHLMYD TRACH DNA AMP PROBE: CPT

## 2024-07-22 PROCEDURE — 93005 ELECTROCARDIOGRAM TRACING: CPT

## 2024-07-22 PROCEDURE — 87086 URINE CULTURE/COLONY COUNT: CPT

## 2024-07-22 PROCEDURE — 87389 HIV-1 AG W/HIV-1&-2 AB AG IA: CPT

## 2024-07-22 PROCEDURE — 86780 TREPONEMA PALLIDUM: CPT

## 2024-07-22 PROCEDURE — 86645 CMV ANTIBODY IGM: CPT

## 2024-07-22 PROCEDURE — 86644 CMV ANTIBODY: CPT

## 2024-07-22 PROCEDURE — 86803 HEPATITIS C AB TEST: CPT

## 2024-07-22 PROCEDURE — 80048 BASIC METABOLIC PNL TOTAL CA: CPT

## 2024-07-22 PROCEDURE — 87591 N.GONORRHOEAE DNA AMP PROB: CPT

## 2024-07-22 RX ORDER — IBUPROFEN 800 MG/1
800 TABLET ORAL EVERY 8 HOURS PRN
COMMUNITY

## 2024-07-22 ASSESSMENT — DUKE ACTIVITY SCORE INDEX (DASI)
CAN YOU CLIMB A FLIGHT OF STAIRS OR WALK UP A HILL: YES
CAN YOU WALK A BLOCK OR TWO ON LEVEL GROUND: YES
CAN YOU PARTICIPATE IN STRENOUS SPORTS LIKE SWIMMING, SINGLES TENNIS, FOOTBALL, BASKETBALL, OR SKIING: YES
CAN YOU WALK INDOORS, SUCH AS AROUND YOUR HOUSE: YES
DASI METS SCORE: 9.9
CAN YOU HAVE SEXUAL RELATIONS: YES
CAN YOU PARTICIPATE IN MODERATE RECREATIONAL ACTIVITIES LIKE GOLF, BOWLING, DANCING, DOUBLES TENNIS OR THROWING A BASEBALL OR FOOTBALL: YES
CAN YOU RUN A SHORT DISTANCE: YES
CAN YOU DO HEAVY WORK AROUND THE HOUSE LIKE SCRUBBING FLOORS OR LIFTING AND MOVING HEAVY FURNITURE: YES
TOTAL_SCORE: 58.2
CAN YOU DO LIGHT WORK AROUND THE HOUSE LIKE DUSTING OR WASHING DISHES: YES
CAN YOU TAKE CARE OF YOURSELF (EAT, DRESS, BATHE, OR USE TOILET): YES
CAN YOU DO YARD WORK LIKE RAKING LEAVES, WEEDING OR PUSHING A MOWER: YES
CAN YOU DO MODERATE WORK AROUND THE HOUSE LIKE VACUUMING, SWEEPING FLOORS OR CARRYING GROCERIES: YES

## 2024-07-22 ASSESSMENT — LIFESTYLE VARIABLES: SMOKING_STATUS: NONSMOKER

## 2024-07-22 ASSESSMENT — ACTIVITIES OF DAILY LIVING (ADL): ADL_SCORE: 0

## 2024-07-22 NOTE — CPM/PAT H&P
"CPM/PAT Evaluation       Name: Fracisco Reynoso (Fracisco Reynoso)  /Age: 1995/28 y.o.     In-Person       Chief Complaint: Left testicle pain     HPI  Pleasant 29 y/o male presents with left sided testicle pain and swelling. He noticed this earlier this month and states his testicle has \"doubled in size\". He does have a history of an undescended testicle at age 11 or 12. States that the testicle has been painless and has not increased in size since early-mid July. Denies any urinary complaints/concerns. Denies any other associated symptoms. Denies recent fever/illness/chills.     Past Medical History:   Diagnosis Date    Anxiety     Arthralgia, unspecified joint     Bipolar disorder (Multi)     Depression     GERD (gastroesophageal reflux disease)     Seasonal allergies     Undescended testicle        Past Surgical History:   Procedure Laterality Date    UNDESCENDED TESTICLE EXPLORATION      WISDOM TOOTH EXTRACTION         Patient  reports that he is not currently sexually active and has had partner(s) who are male. He reports using the following method of birth control/protection: None.    Family History   Problem Relation Name Age of Onset    Anxiety disorder Mother      Obesity Mother      Depression Mother      No Known Problems Father      No Known Problems Brother         No Known Allergies    Prior to Admission medications    Medication Sig Start Date End Date Taking? Authorizing Provider   cetirizine (ZyrTEC) 10 mg tablet Take 1 tablet (10 mg) by mouth if needed.    Historical Provider, MD   famotidine (Pepcid) 20 mg tablet Take 1 tablet (20 mg) by mouth if needed for heartburn.    Historical Provider, MD   meloxicam (Mobic) 15 mg tablet Take 1 tablet (15 mg) by mouth once daily. 24  Maren Salazar DO        Constitutional: Negative for fever, chills, or sweats   ENMT: Negative for nasal discharge, congestion, ear pain, mouth pain, throat pain. Positive for glasses. Positive for seasonal " allergies-usually in the fall. Positive for right front tooth-crown-slightly loose per patient   Respiratory: Negative for cough, wheezing, shortness of breath   Cardiac: Negative for chest pain, dyspnea on exertion, palpitations   Gastrointestinal: Negative for nausea, vomiting, diarrhea, constipation, abdominal pain  Genitourinary: Negative for dysuria, flank pain, frequency, hematuria   Musculoskeletal: Negative for decreased ROM, pain, swelling, weakness   Neurological: Negative for dizziness, confusion, headache  Psychiatric: Negative for mood changes   Skin: Negative for itching, rash, ulcer. Positive for chronic chest acne.     Hematologic/Lymph: Negative for bruising, easy bleeding  Allergic/Immunologic: Negative itching, sneezing, swelling      Physical Exam  Vitals reviewed.   Constitutional:       Appearance: Normal appearance.   HENT:      Head: Normocephalic.      Mouth/Throat:      Mouth: Mucous membranes are moist.      Pharynx: Oropharynx is clear.   Eyes:      Pupils: Pupils are equal, round, and reactive to light.   Cardiovascular:      Rate and Rhythm: Normal rate and regular rhythm.      Heart sounds: Normal heart sounds.   Pulmonary:      Effort: Pulmonary effort is normal.      Breath sounds: Normal breath sounds.   Abdominal:      General: Bowel sounds are normal.      Palpations: Abdomen is soft.   Musculoskeletal:         General: Normal range of motion.      Cervical back: Normal range of motion.   Skin:     General: Skin is warm and dry.   Neurological:      General: No focal deficit present.      Mental Status: He is alert and oriented to person, place, and time.   Psychiatric:         Mood and Affect: Mood normal.         Behavior: Behavior normal.        PAT AIRWAY:   Airway:     Mallampati::  II    Neck ROM::  Full   Right front tooth-crown-slightly loose per patient       Visit Vitals  /70   Pulse 69   Temp 36 °C (96.8 °F) (Tympanic)   Resp 16       DASI Risk Score       Flowsheet Row Most Recent Value   DASI SCORE 58.2   METS Score (Will be calculated only when all the questions are answered) 9.9          Caprini DVT Assessment      Flowsheet Row Most Recent Value   DVT Score 1   Age Less than 40 years   BMI 30 or less          Modified Frailty Index      Flowsheet Row Most Recent Value   Modified Frailty Index Calculator 0          CHADS2 Stroke Risk         N/A 3 to 100%: High Risk   2 to < 3%: Medium Risk   0 to < 2%: Low Risk     Last Change: N/A          This score determines the patient's risk of having a stroke if the patient has atrial fibrillation.        This score is not applicable to this patient. Components are not calculated.          Revised Cardiac Risk Index      Flowsheet Row Most Recent Value   Revised Cardiac Risk Calculator 0          Apfel Simplified Score      Flowsheet Row Most Recent Value   Apfel Simplified Score Calculator 2          Risk Analysis Index Results This Encounter         7/22/2024  0923             CHIN Cancer History: Patient does not indicate history of cancer    Total Risk Analysis Index Score Without Cancer: 7    Total Risk Analysis Index Score: 7          Stop Bang Score      Flowsheet Row Most Recent Value   Do you snore loudly? 0   Do you often feel tired or fatigued after your sleep? 0   Has anyone ever observed you stop breathing in your sleep? 0   Do you have or are you being treated for high blood pressure? 0   Recent BMI (Calculated) 18.5   Is BMI greater than 35 kg/m2? 0=No   Age older than 50 years old? 0=No   Is your neck circumference greater than 17 inches (Male) or 16 inches (Female)? 0   Gender - Male 1=Yes   STOP-BANG Total Score 1            Assessment and Plan:     Assessment and Plan:     Preop:   OR with Dr. Catherine on 7/29 for a left radical orchiectomy   Labs ordered per Dr. Catherine   EKG obtained and enclosed. NSR. VR: 68 BPM     HEENT:   Seasonal allergies: Takes zyrtec. Mainly in the fall.     GI:  GERD:  Managed with pepcid     :  Testicular mass: Reason for upcoming surgery. Recently discovered. No current pain. Swelling stabilized per patient.     Neuro-muscular:   Bilat. Knee arthralgia: Was worked up for lupus/autoimmune issue by PCP. Lab results were WNL except for an elevated HCG and AFP-noted by PCP. 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 No familial anesthesia complications that he is aware of.

## 2024-07-22 NOTE — PREPROCEDURE INSTRUCTIONS
Medication List            Accurate as of July 22, 2024  9:47 AM. Always use your most recent med list.                cetirizine 10 mg tablet  Commonly known as: ZyrTEC  Medication Adjustments for Surgery: Continue until night before surgery     cholecalciferol 500 Unit split tablet  Commonly known as: Vitamin D3  Medication Adjustments for Surgery: Stop 7 days before surgery     famotidine 20 mg tablet  Commonly known as: Pepcid  Medication Adjustments for Surgery: Take morning of surgery with sip of water, no other fluids     ibuprofen 800 mg tablet  Medication Adjustments for Surgery: Stop 7 days before surgery                PRE-OPERATIVE INSTRUCTIONS    You will receive notification one business day prior to your procedure to confirm your arrival time. It is important that you answer your phone and/or check your messages during this time. If you do not hear from the surgery center by 5 pm. the day before your procedure, please call [phone].     Please enter the building through the Outpatient entrance and take the elevator off the lobby to the 2nd floor then check in at the Outpatient Surgery desk on the 2nd floor.    INSTRUCTIONS:  Talk to your surgeon for instructions if you should stop your aspirin, blood thinner, or diabetes medicines.  DO NOT take any multivitamins or over the counter supplements for 7-10 days before surgery.  If not being admitted, you must have an adult immediately available to drive you home after surgery. We also highly recommend you have someone stay with you for the entire day and night of your surgery.  For children having surgery, a parent or legal guardian must accompany them to the surgery center. If this is not possible, please call [phone] to make additional arrangements.  For adults who are unable to consent or make medical decisions for themselves, a legal guardian or Power of  must accompany them to the surgery center. If this is not possible, please  call [phone] to make additional arrangements.  Wear comfortable, loose fitting clothing.  All jewelry and piercings must be removed. If you are unable to remove an item or have a dermal piercing, please be sure to tell the nurse when you arrive for surgery.  Nail polish and make-up must be removed.  Avoid smoking or consuming alcohol for 24 hours before surgery.  To help prevent infection, please take a shower/bath and wash your hair the night before and/or morning of surgery (or follow other specific bathing instructions provided).    Preoperative Fasting Guidelines    Why must I stop eating and drinking near surgery time?  With sedation, food or liquid in your stomach can enter your lungs causing serious complications  Increases nausea and vomiting    When do I need to stop eating and drinking before my surgery?  Do not eat any solid food after midnight the night before your surgery/procedure unless otherwise instructed by your surgeon.   You may have up to 13.5 ounces of clear liquid until TWO hours before your instructed arrival time to the hospital.  This includes water, black tea/coffee, (no milk or cream) apple juice, and electrolyte drinks (Gatorade).   You may chew gum until TWO hours before your surgery/procedure      If applicable, notify your surgeons office immediately of any new skin changes that occur to the surgical limb.      If you have any questions or concerns, please call Pre-Admission Testing at [phone].      Home Preoperative Antibacterial Shower with Chlorhexidine gluconate (CHG)     What is a home preoperative antibacterial shower?  This shower is a way of cleaning the skin with a germ killing solution before surgery. The solution contains chlorhexidine gluconate, commonly known as CHG. CHG is a skin cleanser with germ killing ability. Let your doctor know if you are allergic to chlorhexidine.    Why do I need to take a preoperative antibacterial shower?  Skin is not sterile. It  is best to try to make your skin as free of germs as possible before surgery. Proper cleansing with a germ killing soap before surgery can lower the number of germs on your skin. This helps to reduce the risk of infection at the surgical site. Following the instructions listed below will help you prepare your skin for surgery.    How do I use the solution?  Begin using your CHG soap the night before and again the morning of your procedure.   Do not shave the day before or day of surgery.  Remove all jewelry until after surgery. Take off rings and take out all body-piercing jewelry.  Wash your face and hair with normal soap and shampoo before you use the CHG soap.  Apply the CHG solution to a clean wet washcloth. Move away from the water to avoid premature rinsing of the CHG soap as you are applying. Firmly lather your entire body from the neck down. Do not use CHG on your face, eyes, ears, or genitals.   Pay special attention to the area where your incisions will be located.  Do not scrub your skin too hard.  It is important to allow the CHG soap to sit on your skin for 3-5 minutes.  Rinse the solution off your body completely. Do not wash with your normal soap after the CHG soap solution.  Pat yourself dry with a clean, soft towel.  Do not apply powders, lotions or deodorants as these might block how the CHG soap works.   Dress in clean clothing.  Be sure to sleep with clean, freshly laundered sheets.  Be aware that CHG can cause stains on fabric. Rinse your washcloth and other linens that have contact with CHG completely. Use only non-chlorine detergents to launder the items used.

## 2024-07-23 LAB
BACTERIA UR CULT: NO GROWTH
C TRACH RRNA SPEC QL NAA+PROBE: NEGATIVE
N GONORRHOEA DNA SPEC QL PROBE+SIG AMP: NEGATIVE

## 2024-07-24 LAB
CMV IGM SERPL-ACNC: <8 AU/ML
HTLV I+II AB SER QL IA: NEGATIVE

## 2024-07-26 ENCOUNTER — ANCILLARY PROCEDURE (OUTPATIENT)
Dept: ENDOCRINOLOGY | Facility: CLINIC | Age: 29
End: 2024-07-26

## 2024-07-26 DIAGNOSIS — N50.89 MASS OF LEFT TESTICLE: ICD-10-CM

## 2024-07-26 LAB
# OF VIALS: 4
% EX RESIDUAL CYTOPLASM (SEMEN): 0 %
% HEAD DEFECTS (SEMEN): 98.8 %
% NECK MIDPIECE (SEMEN): 35.5 %
% NORMAL (SEMEN): 1 % (ref 4–?)
% TAIL DEFECTS (SEMEN): 5.5 %
ABSTINENCE (DAYS): 4 DAYS (ref 2–7)
AGGLUTINATION (SEMEN): NO
AMOUNT MISSED:: ABNORMAL
ANALYZED TIME:: ABNORMAL
ANDROLOGY LAB ID#: ABNORMAL
CLUMPS (SEMEN): NO
COLLECTED COMPLETELY: NO
COLLECTION LOCATION:: ABNORMAL
COLLECTION METHOD:: ABNORMAL
CONCENTRATION CN (POST-WASH): 2.58 MILL/ML
CONCENTRATION(SEMEN): 4.8 MILL/ML (ref 15–?)
DEBRIS (SEMEN): YES
LEUKOCYTE (SEMEN): NEGATIVE
NON PROG. MOTILITY (SEMEN): 10 %
NON PROG. MOTILITY CN (POST-WASH): 5 %
PORTION MISSED REI: ABNORMAL
PROG. MOTILITY (SEMEN): 59 % (ref 32–?)
PROG. MOTILITY CN (POST-WASH): 56 %
RECEIVED TIME:: ABNORMAL
SEMEN APPEARANCE: NORMAL
SEMEN LIQUEFACTION: NORMAL
SEMEN VISCOSITY: NORMAL
SPECIMEN ID REI: ABNORMAL
TOT. NO OF NORM. MOTILE SPERM (SEMEN): 0.04 MILL
TOT. NO OF NORM. SPERM (SEMEN): 0.06 MILL
TOTAL MOTILE SPERM PER VIAL (POST-THAW): 0.46 MILL/VIAL
TOTAL MOTILITY (SEMEN): 69 % (ref 40–?)
TOTAL MOTILITY CN (POST-WASH): 60 %
TOTAL NO OF MOTILE (SEMEN): 3.96 MILL
TOTAL NO OF MOTILE CN (POST-WASH): 3.43 MILL
TOTAL NO OF RND CELLS (SEMEN): 2 MILL (ref ?–5)
TOTAL NO OF SPERM (SEMEN): 5.76 MILL (ref 39–?)
TOTAL NO OF SPERM CN (POST-WASH): 5.68 MILL
UNIT VOLUME: 0.5
VOLUME (SEMEN): 1.2 ML (ref 1.5–?)
VOLUME CN (POST-WASH): 2.2 ML

## 2024-07-26 PROCEDURE — 89259 CRYOPRESERVATION SPERM: CPT | Performed by: OBSTETRICS & GYNECOLOGY

## 2024-07-29 ENCOUNTER — HOSPITAL ENCOUNTER (OUTPATIENT)
Facility: HOSPITAL | Age: 29
Setting detail: OUTPATIENT SURGERY
Discharge: HOME | End: 2024-07-29
Attending: UROLOGY | Admitting: UROLOGY
Payer: COMMERCIAL

## 2024-07-29 ENCOUNTER — ANESTHESIA EVENT (OUTPATIENT)
Dept: OPERATING ROOM | Facility: HOSPITAL | Age: 29
End: 2024-07-29
Payer: COMMERCIAL

## 2024-07-29 ENCOUNTER — ANESTHESIA (OUTPATIENT)
Dept: OPERATING ROOM | Facility: HOSPITAL | Age: 29
End: 2024-07-29
Payer: COMMERCIAL

## 2024-07-29 VITALS
HEIGHT: 73 IN | SYSTOLIC BLOOD PRESSURE: 127 MMHG | TEMPERATURE: 98.4 F | OXYGEN SATURATION: 100 % | BODY MASS INDEX: 18.55 KG/M2 | DIASTOLIC BLOOD PRESSURE: 60 MMHG | RESPIRATION RATE: 16 BRPM | HEART RATE: 66 BPM | WEIGHT: 140 LBS

## 2024-07-29 DIAGNOSIS — N50.89 MASS OF LEFT TESTICLE: Primary | ICD-10-CM

## 2024-07-29 PROCEDURE — 7100000010 HC PHASE TWO TIME - EACH INCREMENTAL 1 MINUTE: Performed by: UROLOGY

## 2024-07-29 PROCEDURE — 54530 REMOVAL OF TESTIS: CPT | Performed by: UROLOGY

## 2024-07-29 PROCEDURE — A54530 PR REMOVAL TESTIS,RADICAL: Performed by: ANESTHESIOLOGIST ASSISTANT

## 2024-07-29 PROCEDURE — 3600000002 HC OR TIME - INITIAL BASE CHARGE - PROCEDURE LEVEL TWO: Performed by: UROLOGY

## 2024-07-29 PROCEDURE — A54530 PR REMOVAL TESTIS,RADICAL: Performed by: STUDENT IN AN ORGANIZED HEALTH CARE EDUCATION/TRAINING PROGRAM

## 2024-07-29 PROCEDURE — 2500000004 HC RX 250 GENERAL PHARMACY W/ HCPCS (ALT 636 FOR OP/ED): Performed by: NURSE ANESTHETIST, CERTIFIED REGISTERED

## 2024-07-29 PROCEDURE — 3600000007 HC OR TIME - EACH INCREMENTAL 1 MINUTE - PROCEDURE LEVEL TWO: Performed by: UROLOGY

## 2024-07-29 PROCEDURE — 3700000001 HC GENERAL ANESTHESIA TIME - INITIAL BASE CHARGE: Performed by: UROLOGY

## 2024-07-29 PROCEDURE — 2500000004 HC RX 250 GENERAL PHARMACY W/ HCPCS (ALT 636 FOR OP/ED): Mod: JZ | Performed by: UROLOGY

## 2024-07-29 PROCEDURE — 2500000005 HC RX 250 GENERAL PHARMACY W/O HCPCS: Performed by: STUDENT IN AN ORGANIZED HEALTH CARE EDUCATION/TRAINING PROGRAM

## 2024-07-29 PROCEDURE — 2500000004 HC RX 250 GENERAL PHARMACY W/ HCPCS (ALT 636 FOR OP/ED): Performed by: UROLOGY

## 2024-07-29 PROCEDURE — 88309 TISSUE EXAM BY PATHOLOGIST: CPT | Mod: TC,STJLAB | Performed by: UROLOGY

## 2024-07-29 PROCEDURE — 7100000002 HC RECOVERY ROOM TIME - EACH INCREMENTAL 1 MINUTE: Performed by: UROLOGY

## 2024-07-29 PROCEDURE — 2720000007 HC OR 272 NO HCPCS: Performed by: UROLOGY

## 2024-07-29 PROCEDURE — 7100000001 HC RECOVERY ROOM TIME - INITIAL BASE CHARGE: Performed by: UROLOGY

## 2024-07-29 PROCEDURE — 2500000004 HC RX 250 GENERAL PHARMACY W/ HCPCS (ALT 636 FOR OP/ED): Performed by: ANESTHESIOLOGIST ASSISTANT

## 2024-07-29 PROCEDURE — 3700000002 HC GENERAL ANESTHESIA TIME - EACH INCREMENTAL 1 MINUTE: Performed by: UROLOGY

## 2024-07-29 PROCEDURE — 2500000005 HC RX 250 GENERAL PHARMACY W/O HCPCS: Performed by: UROLOGY

## 2024-07-29 PROCEDURE — 2500000005 HC RX 250 GENERAL PHARMACY W/O HCPCS: Performed by: ANESTHESIOLOGIST ASSISTANT

## 2024-07-29 PROCEDURE — 7100000009 HC PHASE TWO TIME - INITIAL BASE CHARGE: Performed by: UROLOGY

## 2024-07-29 RX ORDER — SODIUM CHLORIDE, SODIUM LACTATE, POTASSIUM CHLORIDE, CALCIUM CHLORIDE 600; 310; 30; 20 MG/100ML; MG/100ML; MG/100ML; MG/100ML
INJECTION, SOLUTION INTRAVENOUS CONTINUOUS PRN
Status: DISCONTINUED | OUTPATIENT
Start: 2024-07-29 | End: 2024-07-29

## 2024-07-29 RX ORDER — POLYETHYLENE GLYCOL 3350 17 G/17G
17 POWDER, FOR SOLUTION ORAL DAILY
Qty: 10 PACKET | Refills: 0 | Status: CANCELLED | OUTPATIENT
Start: 2024-07-29

## 2024-07-29 RX ORDER — LIDOCAINE HYDROCHLORIDE 10 MG/ML
0.1 INJECTION INFILTRATION; PERINEURAL ONCE
Status: CANCELLED | OUTPATIENT
Start: 2024-07-29 | End: 2024-07-29

## 2024-07-29 RX ORDER — SODIUM CHLORIDE, SODIUM LACTATE, POTASSIUM CHLORIDE, CALCIUM CHLORIDE 600; 310; 30; 20 MG/100ML; MG/100ML; MG/100ML; MG/100ML
100 INJECTION, SOLUTION INTRAVENOUS CONTINUOUS
Status: CANCELLED | OUTPATIENT
Start: 2024-07-29

## 2024-07-29 RX ORDER — ALBUTEROL SULFATE 0.83 MG/ML
2.5 SOLUTION RESPIRATORY (INHALATION) ONCE AS NEEDED
Status: CANCELLED | OUTPATIENT
Start: 2024-07-29

## 2024-07-29 RX ORDER — MEPERIDINE HYDROCHLORIDE 50 MG/ML
12.5 INJECTION INTRAMUSCULAR; INTRAVENOUS; SUBCUTANEOUS EVERY 10 MIN PRN
Status: DISCONTINUED | OUTPATIENT
Start: 2024-07-29 | End: 2024-07-29 | Stop reason: HOSPADM

## 2024-07-29 RX ORDER — MIDAZOLAM HYDROCHLORIDE 1 MG/ML
INJECTION, SOLUTION INTRAMUSCULAR; INTRAVENOUS AS NEEDED
Status: DISCONTINUED | OUTPATIENT
Start: 2024-07-29 | End: 2024-07-29

## 2024-07-29 RX ORDER — FENTANYL CITRATE 50 UG/ML
25 INJECTION, SOLUTION INTRAMUSCULAR; INTRAVENOUS EVERY 5 MIN PRN
Status: DISCONTINUED | OUTPATIENT
Start: 2024-07-29 | End: 2024-07-29 | Stop reason: HOSPADM

## 2024-07-29 RX ORDER — SODIUM CHLORIDE 0.9 G/100ML
IRRIGANT IRRIGATION AS NEEDED
Status: DISCONTINUED | OUTPATIENT
Start: 2024-07-29 | End: 2024-07-29 | Stop reason: HOSPADM

## 2024-07-29 RX ORDER — FENTANYL CITRATE 50 UG/ML
25 INJECTION, SOLUTION INTRAMUSCULAR; INTRAVENOUS EVERY 5 MIN PRN
Status: CANCELLED | OUTPATIENT
Start: 2024-07-29

## 2024-07-29 RX ORDER — FENTANYL CITRATE 50 UG/ML
INJECTION, SOLUTION INTRAMUSCULAR; INTRAVENOUS AS NEEDED
Status: DISCONTINUED | OUTPATIENT
Start: 2024-07-29 | End: 2024-07-29

## 2024-07-29 RX ORDER — OXYCODONE HYDROCHLORIDE 5 MG/1
5 TABLET ORAL EVERY 4 HOURS PRN
Status: CANCELLED | OUTPATIENT
Start: 2024-07-29

## 2024-07-29 RX ORDER — LIDOCAINE HYDROCHLORIDE 10 MG/ML
0.1 INJECTION INFILTRATION; PERINEURAL ONCE
Status: DISCONTINUED | OUTPATIENT
Start: 2024-07-29 | End: 2024-07-29 | Stop reason: HOSPADM

## 2024-07-29 RX ORDER — SODIUM CHLORIDE, SODIUM LACTATE, POTASSIUM CHLORIDE, CALCIUM CHLORIDE 600; 310; 30; 20 MG/100ML; MG/100ML; MG/100ML; MG/100ML
100 INJECTION, SOLUTION INTRAVENOUS CONTINUOUS
Status: DISCONTINUED | OUTPATIENT
Start: 2024-07-29 | End: 2024-07-29 | Stop reason: HOSPADM

## 2024-07-29 RX ORDER — KETOROLAC TROMETHAMINE 10 MG/1
10 TABLET, FILM COATED ORAL EVERY 6 HOURS PRN
Qty: 20 TABLET | Refills: 0 | OUTPATIENT
Start: 2024-07-29

## 2024-07-29 RX ORDER — MEPERIDINE HYDROCHLORIDE 50 MG/ML
12.5 INJECTION INTRAMUSCULAR; INTRAVENOUS; SUBCUTANEOUS EVERY 10 MIN PRN
Status: CANCELLED | OUTPATIENT
Start: 2024-07-29

## 2024-07-29 RX ORDER — OXYCODONE HYDROCHLORIDE 5 MG/1
5 TABLET ORAL EVERY 6 HOURS PRN
Qty: 10 TABLET | Refills: 0 | Status: CANCELLED | OUTPATIENT
Start: 2024-07-29

## 2024-07-29 RX ORDER — ACETAMINOPHEN 325 MG/1
650 TABLET ORAL EVERY 6 HOURS PRN
Qty: 30 TABLET | Refills: 0 | OUTPATIENT
Start: 2024-07-29

## 2024-07-29 RX ORDER — OXYCODONE HYDROCHLORIDE 5 MG/1
5 TABLET ORAL EVERY 4 HOURS PRN
Status: DISCONTINUED | OUTPATIENT
Start: 2024-07-29 | End: 2024-07-29 | Stop reason: HOSPADM

## 2024-07-29 RX ORDER — IBUPROFEN 400 MG/1
400 TABLET ORAL EVERY 6 HOURS PRN
Status: ON HOLD | COMMUNITY
End: 2024-07-29

## 2024-07-29 RX ORDER — SODIUM CHLORIDE 9 MG/ML
100 INJECTION, SOLUTION INTRAVENOUS CONTINUOUS
Status: DISCONTINUED | OUTPATIENT
Start: 2024-07-29 | End: 2024-07-29 | Stop reason: HOSPADM

## 2024-07-29 RX ORDER — ONDANSETRON HYDROCHLORIDE 2 MG/ML
4 INJECTION, SOLUTION INTRAVENOUS ONCE AS NEEDED
Status: DISCONTINUED | OUTPATIENT
Start: 2024-07-29 | End: 2024-07-29 | Stop reason: HOSPADM

## 2024-07-29 RX ORDER — LIDOCAINE HYDROCHLORIDE 20 MG/ML
INJECTION, SOLUTION EPIDURAL; INFILTRATION; INTRACAUDAL; PERINEURAL AS NEEDED
Status: DISCONTINUED | OUTPATIENT
Start: 2024-07-29 | End: 2024-07-29

## 2024-07-29 RX ORDER — ONDANSETRON HYDROCHLORIDE 2 MG/ML
4 INJECTION, SOLUTION INTRAVENOUS ONCE AS NEEDED
Status: CANCELLED | OUTPATIENT
Start: 2024-07-29

## 2024-07-29 RX ORDER — KETOROLAC TROMETHAMINE 30 MG/ML
INJECTION, SOLUTION INTRAMUSCULAR; INTRAVENOUS AS NEEDED
Status: DISCONTINUED | OUTPATIENT
Start: 2024-07-29 | End: 2024-07-29

## 2024-07-29 RX ORDER — PROPOFOL 10 MG/ML
INJECTION, EMULSION INTRAVENOUS AS NEEDED
Status: DISCONTINUED | OUTPATIENT
Start: 2024-07-29 | End: 2024-07-29

## 2024-07-29 RX ORDER — ACETAMINOPHEN 325 MG/1
975 TABLET ORAL ONCE
Status: COMPLETED | OUTPATIENT
Start: 2024-07-29 | End: 2024-07-29

## 2024-07-29 RX ORDER — ALBUTEROL SULFATE 0.83 MG/ML
2.5 SOLUTION RESPIRATORY (INHALATION) ONCE AS NEEDED
Status: DISCONTINUED | OUTPATIENT
Start: 2024-07-29 | End: 2024-07-29 | Stop reason: HOSPADM

## 2024-07-29 RX ORDER — MIDAZOLAM HYDROCHLORIDE 1 MG/ML
1 INJECTION, SOLUTION INTRAMUSCULAR; INTRAVENOUS ONCE AS NEEDED
Status: CANCELLED | OUTPATIENT
Start: 2024-07-29

## 2024-07-29 RX ORDER — DEXAMETHASONE SODIUM PHOSPHATE 10 MG/ML
INJECTION INTRAMUSCULAR; INTRAVENOUS AS NEEDED
Status: DISCONTINUED | OUTPATIENT
Start: 2024-07-29 | End: 2024-07-29

## 2024-07-29 RX ORDER — PHENYLEPHRINE HCL IN 0.9% NACL 1 MG/10 ML
SYRINGE (ML) INTRAVENOUS AS NEEDED
Status: DISCONTINUED | OUTPATIENT
Start: 2024-07-29 | End: 2024-07-29

## 2024-07-29 RX ORDER — CEFAZOLIN SODIUM 2 G/100ML
2 INJECTION, SOLUTION INTRAVENOUS ONCE
Status: COMPLETED | OUTPATIENT
Start: 2024-07-29 | End: 2024-07-29

## 2024-07-29 RX ORDER — LABETALOL HYDROCHLORIDE 5 MG/ML
5 INJECTION, SOLUTION INTRAVENOUS ONCE AS NEEDED
Status: DISCONTINUED | OUTPATIENT
Start: 2024-07-29 | End: 2024-07-29 | Stop reason: HOSPADM

## 2024-07-29 RX ORDER — MIDAZOLAM HYDROCHLORIDE 1 MG/ML
1 INJECTION, SOLUTION INTRAMUSCULAR; INTRAVENOUS ONCE AS NEEDED
Status: DISCONTINUED | OUTPATIENT
Start: 2024-07-29 | End: 2024-07-29 | Stop reason: HOSPADM

## 2024-07-29 RX ORDER — ONDANSETRON HYDROCHLORIDE 2 MG/ML
INJECTION, SOLUTION INTRAVENOUS AS NEEDED
Status: DISCONTINUED | OUTPATIENT
Start: 2024-07-29 | End: 2024-07-29

## 2024-07-29 RX ORDER — IBUPROFEN 400 MG/1
800 TABLET ORAL EVERY 6 HOURS
Qty: 40 TABLET | Refills: 0 | Status: SHIPPED | OUTPATIENT
Start: 2024-07-29 | End: 2024-08-03

## 2024-07-29 RX ORDER — LABETALOL HYDROCHLORIDE 5 MG/ML
5 INJECTION, SOLUTION INTRAVENOUS ONCE AS NEEDED
Status: CANCELLED | OUTPATIENT
Start: 2024-07-29

## 2024-07-29 SDOH — HEALTH STABILITY: MENTAL HEALTH: CURRENT SMOKER: 0

## 2024-07-29 ASSESSMENT — PAIN - FUNCTIONAL ASSESSMENT
PAIN_FUNCTIONAL_ASSESSMENT: 0-10

## 2024-07-29 ASSESSMENT — COLUMBIA-SUICIDE SEVERITY RATING SCALE - C-SSRS
1. IN THE PAST MONTH, HAVE YOU WISHED YOU WERE DEAD OR WISHED YOU COULD GO TO SLEEP AND NOT WAKE UP?: NO
6. HAVE YOU EVER DONE ANYTHING, STARTED TO DO ANYTHING, OR PREPARED TO DO ANYTHING TO END YOUR LIFE?: NO
2. HAVE YOU ACTUALLY HAD ANY THOUGHTS OF KILLING YOURSELF?: NO

## 2024-07-29 ASSESSMENT — PAIN SCALES - GENERAL
PAINLEVEL_OUTOF10: 0 - NO PAIN
PAINLEVEL_OUTOF10: 3

## 2024-07-29 NOTE — ANESTHESIA PROCEDURE NOTES
Airway  Date/Time: 7/29/2024 1:37 PM  Urgency: elective    Airway not difficult    Staffing  Performed: [facility]   Authorized by: Saul Canada DO    Performed by: [facility] Car  Patient location during procedure: OR    Indications and Patient Condition  Indications for airway management: anesthesia  Spontaneous Ventilation: absent  Sedation level: deep  Preoxygenated: yes  Patient position: sniffing  Mask difficulty assessment: 0 - not attempted    Final Airway Details  Final airway type: supraglottic airway      Successful airway: Size 5     Number of attempts at approach: 1

## 2024-07-29 NOTE — DISCHARGE INSTRUCTIONS
POST-OPERATIVE INSTRUCTIONS: RADICAL ORCHIECTOMY      Pain Control:    Tylenol (acetaminophen) 975mg can be taken every 6 hours until bedtime.    Ibuprofen 800mg can be taken every 6 hours for pain as needed  You may use an ice pack (or bag of frozen veggies) over your underwear to the surgical area to decrease swelling and pain.  Recommend wearing tight underwear, or jockstrap for scrotal support to help minimize swelling.  Also can roll up towel placed under scrotum when laying in bed or sitting in chair to help to elevate scrotum.    Activity:  Avoid vigorous activities (bike/scooter riding, swimming, gym, etc.) for 2 weeks.  You may return to school/work 1-3 days after surgery.    Diet:  There are no dietary restrictions after surgery but some nausea on the day of surgery is normal. Try liquids and light meals the first day.  Make sure to drink plenty of fluids to keep hydrated.     Wound Care:  May shower next day after surgery. No baths, pools, hot tubs etc. for at least 2 weeks. Do not put any lotions/creams/ointments etc. over the incision.   Wear tight fitting supportive underwear for the next week to help prevent swelling and decrease discomfort.  All stitches will dissolve. The skin glue over the incision will flake off over the next few days. Avoid picking this off; it is okay to let soap and water run over it.  Use gauze in underwear to collect any drainage. Blood tinged drainage is expected and should decrease daily.     When to Call the Surgeon:  Fever higher than 100?F.  Repeated vomiting.  Pain not controlled with medication.  Active bleeding or excessive drainage from incision(s) -- some drainage staining gauze in the underwear is expected.  Call [phone]  if you have questions.   After 5 PM or on weekends and holidays, call the hospital  at [phone] and ask for the [id number].  In case of emergency, call [phone].    Follow-Up:  Please call [phone] if you have any  concerns or issues following surgery.  Also you may call this number to schedule a post-op check in 1-2 weeks after surgery or to reschedule. If you are waiting for pathology results, you will receive them at this appointment.

## 2024-07-29 NOTE — INTERVAL H&P NOTE
H&P reviewed. The patient was examined and there are no changes to the H&P.  Proceed with left radical orchiectomy.    Bruce Amado MD  Urology Resident (PGY-5)  Adult Pager [phone]  Pediatric Pager [phone]

## 2024-07-29 NOTE — BRIEF OP NOTE
Date: 2024  OR Location: [facility] OR    Name: Fracisco Reynoso : 1995, Age: 28 y.o., MRN: [id number], Sex: male    Diagnosis  Pre-op Diagnosis      * Mass of left testicle [N50.89] Post-op Diagnosis     * Mass of left testicle [N50.89]     Procedures  Orchiectomy Radical  41928 - FL ORCHIECTOMY RADICAL TUMOR INGUINAL APPROACH      Surgeons      * Mateusz Catherine - Primary    Resident/Fellow/Other Assistant:  Surgeons and Role:     * Bruce Amado MD - Resident - Assisting     * Juanjo Ames MD - Resident - Assisting    Procedure Summary  Anesthesia: Anesthesia type not filed in the log.  ASA: I  Anesthesia Staff: Anesthesiologist: Saul Canada DO  CRNA: Ame Painter APRN-CRNA  C-AA: CATA Car  Estimated Blood Loss: 3mL  Intra-op Medications:   Administrations occurring from 1355 to 1545 on 24:   Medication Name Total Dose   sodium chloride 0.9 % irrigation solution 1,000 mL              Anesthesia Record               Intraprocedure I/O Totals          Intake    LR infusion 500.00 mL    Total Intake 500 mL          Specimen:   ID Type Source Tests Collected by Time   1 : LEFT TESTICLE Tissue TESTIS RADICAL, LEFT SURGICAL PATHOLOGY EXAM Mateusz Catherine MD 2024 [id number]        Staff:   Circulator: Holli Bergman Person: Chana          Findings: Diffusely hard left testicle. Uncomplicated radical orchiectomy without testicular violation.     Complications:  None; patient tolerated the procedure well.     Disposition: PACU - hemodynamically stable.  Condition: stable  Specimens Collected:   ID Type Source Tests Collected by Time   1 : LEFT TESTICLE Tissue TESTIS RADICAL, LEFT SURGICAL PATHOLOGY EXAM Mateusz Catherine MD 2024 [id number]     Attending Attestation:     Mateusz Catherine  Phone Number: [phone]

## 2024-07-29 NOTE — OP NOTE
Date: 2024  OR Location: [facility] OR    Name: Fracisco Reynoso, : 1995, Age: 28 y.o., MRN: [id number], Sex: male    Diagnosis  Pre-op Diagnosis      * Mass of left testicle [N50.89] Post-op Diagnosis     * Mass of left testicle [N50.89]     Procedures  Left radical orchiectomy    Surgeons      * Mateusz Catherine - Primary    Resident/Fellow/Other Assistant:  Surgeons and Role:     * Bruce Amado MD - Resident - Assisting     * Juanjo Ames MD - Resident - Assisting    Procedure Summary  Anesthesia: General  ASA: I  Anesthesia Staff:   Anesthesiologist: Saul Canada DO  CRNA: DANIEL Stevenson-CRNA  C-AA: CATA Car  Estimated Blood Loss: 10 cc  Intra-op Medications:   Medication Name Total Dose   sodium chloride 0.9 % irrigation solution 3,000 mL   ceFAZolin in dextrose (iso-os) (Ancef) IVPB 2 g 2 g              Anesthesia Record               Intraprocedure I/O Totals       None           Specimen:   Order Name Source Comment Collection Info Order Time   SURGICAL PATHOLOGY EXAM TESTIS RADICAL, LEFT Pre-op diagnosis:  Mass of left testicle [N50.89] Collected By: Mateusz Catherine MD 2024  2:13 PM       Staff:   Circulator: Holli White RN  Scrub Person: Chana Pinzon         Drains and/or Catheters: * None in log *    Tourniquet Times:         Implants:     Findings: Firm mass left testicle    Indications: Fracisco Reynoso is an 28 y.o. male who is having surgery for Mass of left testicle [N50.89].  Patient has a history of undescended testicle on the left and previously had a left orchiopexy.  Presented with a palpable mass confirmed by ultrasound.  Presents today for radical orchiectomy.  The risk, benefits, and alternatives were discussed with the patient.  Informed consent was obtained.      Procedure Details: Patient was brought to the operating room and placed in the supine position.  General anesthesia was induced.  Once anesthetized, his lower abdomen and genitalia were  shaved, prepped, and draped in the usual sterile fashion.  A small 2 inch incision was made in the left groin overlying the external ring.  We used Bovie electrocautery divide the subcutaneous tissue.  We then opened the external ring with Metzenbaum scissors.  We encircled the spermatic cord with a Penrose drain and cinched it tight to occlude the blood supply.  Testicle was then delivered up through the incision.  Gubernaculum was taken down with Bovie electrocautery freeing the testicle from the scrotal wall.  We then  the vas deferens from the vascular structures of the cord.  These were controlled with Isabel clamps and the spermatic cord was divided.  The vascular structures were suture-ligated with a 2-0 Vicryl.  This was also controlled with a 0 Vicryl tie.  In a similar fashion, the vas deferens was suture-ligated with Vicryl and a silk tie.  Isabel clamps were removed.  There was good hemostasis.  We copiously irrigated the wound and inspected.  No bleeding was noted.  The the external ring was reapproximated with interrupted 2-0 Vicryl's.  Anton's fascia was closed with a running 3-0 Vicryl.  Skin was closed with 4-0 Vicryl and Dermabond.  Wound was anesthetized with half percent Marcaine.  Dry sterile dressing was applied.  The patient was awakened and taken to the PACU in stable condition    Complications:  None; patient tolerated the procedure well.    Disposition: PACU - hemodynamically stable.  Condition: stable

## 2024-07-29 NOTE — ANESTHESIA PREPROCEDURE EVALUATION
Patient: Fracisco Reynoso    Procedure Information       Date/Time: 24 1355    Procedure: Orchiectomy Radical (Left)    [facility]    Surgeons: Mateusz Catherine MD          Vitals:    24 1257   BP: 121/62   Pulse: 75   Resp: 18   Temp: 37.2 °C (99 °F)   SpO2: 100%       Past Surgical History:   Procedure Laterality Date   • UNDESCENDED TESTICLE EXPLORATION     • WISDOM TOOTH EXTRACTION       Past Medical History:   Diagnosis Date   • Anxiety    • Arthralgia, unspecified joint    • Bipolar disorder (Multi)    • Depression    • GERD (gastroesophageal reflux disease)    • PONV (postoperative nausea and vomiting)    • Seasonal allergies    • Undescended testicle        Current Facility-Administered Medications:   •  ceFAZolin (Ancef) 2 g in dextrose (iso)  mL, 2 g, intravenous, Once, Mateusz Catherine MD  •  sodium chloride 0.9% infusion, 100 mL/hr, intravenous, Continuous, Mateusz Catherine MD, Last Rate: 100 mL/hr at 24 1245, 100 mL/hr at 24 1245  Prior to Admission medications    Medication Sig Start Date End Date Taking? Authorizing Provider   cetirizine (ZyrTEC) 10 mg tablet Take 1 tablet (10 mg) by mouth if needed.   Yes Historical Provider, MD   cholecalciferol (Vitamin D3) 500 Unit split tablet Take 2 half tablet (1,000 Units) by mouth once daily.   Yes Historical Provider, MD   famotidine (Pepcid) 20 mg tablet Take 1 tablet (20 mg) by mouth if needed for heartburn.   Yes Historical Provider, MD   ibuprofen 400 mg tablet Take 1 tablet (400 mg) by mouth every 6 hours if needed for moderate pain (4 - 6).   Yes Historical Provider, MD   ibuprofen 800 mg tablet Take 1 tablet (800 mg) by mouth every 8 hours if needed for mild pain (1 - 3).  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         Chemistry    Lab Results   Component Value Date/Time     (L) 07/22/2024 0953    K 4.3 07/22/2024 0953    CL 98 07/22/2024 0953    CO2 29 07/22/2024 0953    BUN 8 07/22/2024 0953    CREATININE 0.94 07/22/2024 0953    Lab Results   Component Value Date/Time    CALCIUM 9.3 07/22/2024 0953    ALKPHOS 58 11/08/2023 1145    AST 19 11/08/2023 1145    ALT 18 11/08/2023 1145    BILITOT 1.4 (H) 11/08/2023 1145          Lab Results   Component Value Date/Time    WBC 7.8 07/22/2024 0953    HGB 14.9 07/22/2024 0953    HCT 47.4 07/22/2024 0953     07/22/2024 0953     No results found for: \"PROTIME\", \"PTT\", \"INR\"  Encounter Date: 07/22/24   ECG 12 lead   Result Value    Ventricular Rate 68    Atrial Rate 68    GA Interval 142    QRS Duration 90    QT Interval 346    QTC Calculation(Bazett) 367    P Axis 17    R Axis 89    T Axis 73    QRS Count 11    Q Onset 216    P Onset 145    P Offset 181    T Offset 389    QTC Fredericia 360    Narrative    Normal sinus rhythm  Normal ECG  When compared with ECG of 08-NOV-2020 00:29,  PREVIOUS ECG IS PRESENT  Confirmed by Meño Whyte ([id number]) on 7/22/2024 9:41:59 PM        Relevant Problems   GI   (+) GERD (gastroesophageal reflux disease)       Clinical information reviewed:   Tobacco  Allergies  Meds   Med Hx  Surg Hx   Fam Hx  Soc Hx        NPO Detail:  NPO/Void Status  Carbohydrate Drink Given Prior to Surgery? : N  Date of Last Liquid: 07/29/24  Time of Last Liquid: 0830  Date of Last Solid: 07/28/24  Time of Last Solid: 2200  Last Intake Type: Clear fluids  Time of Last Void: 1259         Physical Exam    Airway  Mallampati: II     Cardiovascular - normal exam  Rhythm: regular  Rate: normal     Dental   Comments: Loose upper front teeth with crowns   Pulmonary - normal exam     Abdominal - normal exam  Abdomen: soft           Anesthesia Plan    History " of general anesthesia?: yes  History of complications of general anesthesia?: no    ASA 1     general     The patient is not a current smoker.  Patient was previously instructed to abstain from smoking on day of procedure.  Patient did not smoke on day of procedure.  Education provided regarding risk of obstructive sleep apnea.  intravenous induction   Postoperative administration of opioids is intended.  Anesthetic plan and risks discussed with patient.  Use of blood products discussed with patient who.

## 2024-07-30 ASSESSMENT — PAIN SCALES - GENERAL: PAIN_LEVEL: 0

## 2024-07-30 NOTE — ANESTHESIA POSTPROCEDURE EVALUATION
Patient: Fracisco Reynoso    Procedure Summary       Date: 07/29/24 Room / Location: [facility] OR 08 / [facility]    Anesthesia Start: 1332 Anesthesia Stop: 1450    Procedure: Orchiectomy Radical (Left) Diagnosis:       Mass of left testicle      (Mass of left testicle [N50.89])    Surgeons: Mateusz Catherine MD Responsible Provider: Saul Canada DO    Anesthesia Type: general ASA Status: 1            Anesthesia Type: general    Vitals Value Taken Time   /49 07/29/24 1600   Temp 36.9 °C (98.4 °F) 07/29/24 1545   Pulse 58 07/29/24 1614   Resp 20 07/29/24 1614   SpO2 100 % 07/29/24 1614   Vitals shown include unfiled device data.    Anesthesia Post Evaluation    Patient location during evaluation: PACU  Patient participation: complete - patient participated  Level of consciousness: awake  Pain score: 0  Pain management: adequate  Multimodal analgesia pain management approach  Airway patency: patent  Two or more strategies used to mitigate risk of obstructive sleep apnea  Cardiovascular status: acceptable  Respiratory status: acceptable  Hydration status: acceptable  Postoperative Nausea and Vomiting: none        There were no known notable events for this encounter.

## 2024-08-05 LAB
LAB AP BLOCK FOR ADDITIONAL STUDIES: NORMAL
LABORATORY COMMENT REPORT: NORMAL
PATH REPORT.FINAL DX SPEC: NORMAL
PATH REPORT.GROSS SPEC: NORMAL
PATH REPORT.RELEVANT HX SPEC: NORMAL
PATH REPORT.TOTAL CANCER: NORMAL
PATHOLOGY SYNOPTIC REPORT: NORMAL

## 2024-08-06 ENCOUNTER — OFFICE VISIT (OUTPATIENT)
Dept: PRIMARY CARE | Facility: CLINIC | Age: 29
End: 2024-08-06
Payer: COMMERCIAL

## 2024-08-06 VITALS
RESPIRATION RATE: 16 BRPM | BODY MASS INDEX: 18.71 KG/M2 | WEIGHT: 141.2 LBS | DIASTOLIC BLOOD PRESSURE: 68 MMHG | HEART RATE: 96 BPM | HEIGHT: 73 IN | TEMPERATURE: 98.7 F | SYSTOLIC BLOOD PRESSURE: 118 MMHG | OXYGEN SATURATION: 98 %

## 2024-08-06 DIAGNOSIS — C62.12 MALIGNANT NEOPLASM OF DESCENDED LEFT TESTIS (MULTI): ICD-10-CM

## 2024-08-06 DIAGNOSIS — L73.9 FOLLICULITIS: ICD-10-CM

## 2024-08-06 DIAGNOSIS — T78.40XA ALLERGIC REACTION, INITIAL ENCOUNTER: Primary | ICD-10-CM

## 2024-08-06 PROBLEM — C62.92: Status: ACTIVE | Noted: 2024-08-06

## 2024-08-06 PROBLEM — F31.9 BIPOLAR AFFECTIVE DISORDER, REMISSION STATUS UNSPECIFIED (MULTI): Status: ACTIVE | Noted: 2024-08-06

## 2024-08-06 PROBLEM — F10.21 ALCOHOL DEPENDENCE, IN REMISSION (MULTI): Status: ACTIVE | Noted: 2024-08-06

## 2024-08-06 PROCEDURE — 1036F TOBACCO NON-USER: CPT | Performed by: FAMILY MEDICINE

## 2024-08-06 PROCEDURE — 3008F BODY MASS INDEX DOCD: CPT | Performed by: FAMILY MEDICINE

## 2024-08-06 PROCEDURE — 99213 OFFICE O/P EST LOW 20 MIN: CPT | Performed by: FAMILY MEDICINE

## 2024-08-06 RX ORDER — ACETAMINOPHEN 325 MG/1
500 TABLET ORAL EVERY 6 HOURS PRN
Status: ON HOLD | COMMUNITY

## 2024-08-06 RX ORDER — SULFAMETHOXAZOLE AND TRIMETHOPRIM 800; 160 MG/1; MG/1
1 TABLET ORAL 2 TIMES DAILY
Qty: 14 TABLET | Refills: 0 | Status: ON HOLD | OUTPATIENT
Start: 2024-08-06 | End: 2024-08-13

## 2024-08-06 NOTE — PATIENT INSTRUCTIONS
Today we addressed your post op rash  I have advised that you take an antibiotic and spot treat with hydrocortisone in the upper areas of rash that are irritating you.  Gently use soap and water but do not apply any other dressings or lotions to the area

## 2024-08-06 NOTE — PROGRESS NOTES
"Subjective   Patient ID: Fracisco Reynoso is a 28 y.o. male who presents for Rash (Lower abdomen ).    He had surgery on 7/29 to remove the left testicle for testicular cancer.  He has a rash now.  The day after the surgery this started he started with a red, raised rash, he has itching and has been taking zyrtec.  It's minimally itchy and not really painful.      Rash         Review of Systems   Skin:  Positive for rash.       Objective   /68 (BP Location: Right arm, Patient Position: Sitting)   Pulse 96   Temp 37.1 °C (98.7 °F)   Resp 16   Ht 1.854 m (6' 1\")   Wt 64 kg (141 lb 3.2 oz)   SpO2 98%   BMI 18.63 kg/m²     Physical Exam  Constitutional:       Appearance: Normal appearance.   HENT:      Head: Normocephalic and atraumatic.      Nose: No congestion.      Mouth/Throat:      Mouth: Mucous membranes are dry.   Pulmonary:      Effort: No respiratory distress.   Genitourinary:     Penis: Normal.       Comments: Anterior lower abdominal wall with erythematous based rash, consistent with dermatitis, and areas of folliculitis concentrated more around the incision site  Neurological:      Mental Status: He is alert.         Assessment/Plan   Diagnoses and all orders for this visit:  Allergic reaction, initial encounter  Folliculitis  -     sulfamethoxazole-trimethoprim (Bactrim DS) 800-160 mg tablet; Take 1 tablet by mouth 2 times a day for 7 days.         "

## 2024-08-11 ENCOUNTER — HOSPITAL ENCOUNTER (INPATIENT)
Facility: HOSPITAL | Age: 29
LOS: 2 days | Discharge: HOME | End: 2024-08-13
Attending: EMERGENCY MEDICINE | Admitting: STUDENT IN AN ORGANIZED HEALTH CARE EDUCATION/TRAINING PROGRAM
Payer: COMMERCIAL

## 2024-08-11 DIAGNOSIS — L08.9 SKIN INFECTION: Primary | ICD-10-CM

## 2024-08-11 DIAGNOSIS — N48.1 BALANITIS: ICD-10-CM

## 2024-08-11 DIAGNOSIS — R33.8 ACUTE URINARY RETENTION: ICD-10-CM

## 2024-08-11 DIAGNOSIS — C62.92 MALIGNANT NEOPLASM OF LEFT TESTIS, UNSPECIFIED WHETHER DESCENDED OR UNDESCENDED (MULTI): ICD-10-CM

## 2024-08-11 DIAGNOSIS — T78.40XA ALLERGIC REACTION, INITIAL ENCOUNTER: ICD-10-CM

## 2024-08-11 LAB
ALBUMIN SERPL BCP-MCNC: 4.5 G/DL (ref 3.4–5)
ALP SERPL-CCNC: 53 U/L (ref 33–120)
ALT SERPL W P-5'-P-CCNC: 69 U/L (ref 10–52)
ANION GAP SERPL CALC-SCNC: 10 MMOL/L (ref 10–20)
APPEARANCE UR: CLEAR
AST SERPL W P-5'-P-CCNC: 47 U/L (ref 9–39)
BASOPHILS # BLD AUTO: 0.1 X10*3/UL (ref 0–0.1)
BASOPHILS NFR BLD AUTO: 1.1 %
BILIRUB SERPL-MCNC: 0.5 MG/DL (ref 0–1.2)
BILIRUB UR STRIP.AUTO-MCNC: NEGATIVE MG/DL
BUN SERPL-MCNC: 15 MG/DL (ref 6–23)
CALCIUM SERPL-MCNC: 9.3 MG/DL (ref 8.6–10.3)
CHLORIDE SERPL-SCNC: 101 MMOL/L (ref 98–107)
CO2 SERPL-SCNC: 29 MMOL/L (ref 21–32)
COLOR UR: COLORLESS
CREAT SERPL-MCNC: 1.08 MG/DL (ref 0.5–1.3)
EGFRCR SERPLBLD CKD-EPI 2021: >90 ML/MIN/1.73M*2
EOSINOPHIL # BLD AUTO: 0.49 X10*3/UL (ref 0–0.7)
EOSINOPHIL NFR BLD AUTO: 5.3 %
ERYTHROCYTE [DISTWIDTH] IN BLOOD BY AUTOMATED COUNT: 13.5 % (ref 11.5–14.5)
GLUCOSE SERPL-MCNC: 85 MG/DL (ref 74–99)
GLUCOSE UR STRIP.AUTO-MCNC: NORMAL MG/DL
HCT VFR BLD AUTO: 45.6 % (ref 41–52)
HGB BLD-MCNC: 14.5 G/DL (ref 13.5–17.5)
HIV 1+2 AB+HIV1 P24 AG SERPL QL IA: NONREACTIVE
HOLD SPECIMEN: NORMAL
IMM GRANULOCYTES # BLD AUTO: 0.02 X10*3/UL (ref 0–0.7)
IMM GRANULOCYTES NFR BLD AUTO: 0.2 % (ref 0–0.9)
KETONES UR STRIP.AUTO-MCNC: NEGATIVE MG/DL
LACTATE SERPL-SCNC: 1.2 MMOL/L (ref 0.4–2)
LEUKOCYTE ESTERASE UR QL STRIP.AUTO: NEGATIVE
LYMPHOCYTES # BLD AUTO: 2.36 X10*3/UL (ref 1.2–4.8)
LYMPHOCYTES NFR BLD AUTO: 25.5 %
MCH RBC QN AUTO: 25.6 PG (ref 26–34)
MCHC RBC AUTO-ENTMCNC: 31.8 G/DL (ref 32–36)
MCV RBC AUTO: 80 FL (ref 80–100)
MONOCYTES # BLD AUTO: 0.6 X10*3/UL (ref 0.1–1)
MONOCYTES NFR BLD AUTO: 6.5 %
NEUTROPHILS # BLD AUTO: 5.68 X10*3/UL (ref 1.2–7.7)
NEUTROPHILS NFR BLD AUTO: 61.4 %
NITRITE UR QL STRIP.AUTO: NEGATIVE
NRBC BLD-RTO: 0 /100 WBCS (ref 0–0)
PH UR STRIP.AUTO: 6.5 [PH]
PLATELET # BLD AUTO: 409 X10*3/UL (ref 150–450)
POTASSIUM SERPL-SCNC: 4.4 MMOL/L (ref 3.5–5.3)
PROT SERPL-MCNC: 7.5 G/DL (ref 6.4–8.2)
PROT UR STRIP.AUTO-MCNC: NEGATIVE MG/DL
RBC # BLD AUTO: 5.67 X10*6/UL (ref 4.5–5.9)
RBC # UR STRIP.AUTO: NEGATIVE /UL
SODIUM SERPL-SCNC: 136 MMOL/L (ref 136–145)
SP GR UR STRIP.AUTO: 1
UROBILINOGEN UR STRIP.AUTO-MCNC: NORMAL MG/DL
WBC # BLD AUTO: 9.3 X10*3/UL (ref 4.4–11.3)

## 2024-08-11 PROCEDURE — 96374 THER/PROPH/DIAG INJ IV PUSH: CPT | Mod: 59

## 2024-08-11 PROCEDURE — 2500000001 HC RX 250 WO HCPCS SELF ADMINISTERED DRUGS (ALT 637 FOR MEDICARE OP)

## 2024-08-11 PROCEDURE — 51702 INSERT TEMP BLADDER CATH: CPT

## 2024-08-11 PROCEDURE — 2500000005 HC RX 250 GENERAL PHARMACY W/O HCPCS

## 2024-08-11 PROCEDURE — 83605 ASSAY OF LACTIC ACID: CPT

## 2024-08-11 PROCEDURE — 36415 COLL VENOUS BLD VENIPUNCTURE: CPT

## 2024-08-11 PROCEDURE — 87040 BLOOD CULTURE FOR BACTERIA: CPT | Mod: STJLAB

## 2024-08-11 PROCEDURE — 99285 EMERGENCY DEPT VISIT HI MDM: CPT | Performed by: EMERGENCY MEDICINE

## 2024-08-11 PROCEDURE — 81003 URINALYSIS AUTO W/O SCOPE: CPT

## 2024-08-11 PROCEDURE — G0378 HOSPITAL OBSERVATION PER HR: HCPCS

## 2024-08-11 PROCEDURE — 85025 COMPLETE CBC W/AUTO DIFF WBC: CPT

## 2024-08-11 PROCEDURE — 96361 HYDRATE IV INFUSION ADD-ON: CPT | Mod: 59

## 2024-08-11 PROCEDURE — 87389 HIV-1 AG W/HIV-1&-2 AB AG IA: CPT | Mod: STJLAB

## 2024-08-11 PROCEDURE — 1100000001 HC PRIVATE ROOM DAILY

## 2024-08-11 PROCEDURE — 80053 COMPREHEN METABOLIC PANEL: CPT

## 2024-08-11 PROCEDURE — 51798 US URINE CAPACITY MEASURE: CPT

## 2024-08-11 PROCEDURE — 96368 THER/DIAG CONCURRENT INF: CPT | Mod: 59

## 2024-08-11 PROCEDURE — 2500000004 HC RX 250 GENERAL PHARMACY W/ HCPCS (ALT 636 FOR OP/ED)

## 2024-08-11 PROCEDURE — 86780 TREPONEMA PALLIDUM: CPT | Mod: STJLAB

## 2024-08-11 PROCEDURE — 96365 THER/PROPH/DIAG IV INF INIT: CPT | Mod: 59

## 2024-08-11 PROCEDURE — 87491 CHLMYD TRACH DNA AMP PROBE: CPT | Mod: STJLAB

## 2024-08-11 PROCEDURE — 99285 EMERGENCY DEPT VISIT HI MDM: CPT | Mod: 25

## 2024-08-11 RX ORDER — CLINDAMYCIN PHOSPHATE 900 MG/50ML
900 INJECTION, SOLUTION INTRAVENOUS EVERY 8 HOURS
Status: DISCONTINUED | OUTPATIENT
Start: 2024-08-11 | End: 2024-08-12

## 2024-08-11 RX ORDER — CEFTRIAXONE 2 G/50ML
2 INJECTION, SOLUTION INTRAVENOUS ONCE
Status: COMPLETED | OUTPATIENT
Start: 2024-08-11 | End: 2024-08-11

## 2024-08-11 RX ORDER — KETOROLAC TROMETHAMINE 15 MG/ML
15 INJECTION, SOLUTION INTRAMUSCULAR; INTRAVENOUS EVERY 6 HOURS PRN
Status: DISCONTINUED | OUTPATIENT
Start: 2024-08-11 | End: 2024-08-13 | Stop reason: HOSPADM

## 2024-08-11 RX ORDER — ERTAPENEM 1 G/1
1 INJECTION, POWDER, LYOPHILIZED, FOR SOLUTION INTRAMUSCULAR; INTRAVENOUS EVERY 24 HOURS
Status: DISCONTINUED | OUTPATIENT
Start: 2024-08-11 | End: 2024-08-11

## 2024-08-11 RX ORDER — POLYETHYLENE GLYCOL 3350 17 G/17G
17 POWDER, FOR SOLUTION ORAL DAILY
Status: DISCONTINUED | OUTPATIENT
Start: 2024-08-12 | End: 2024-08-13 | Stop reason: HOSPADM

## 2024-08-11 RX ORDER — CLINDAMYCIN PHOSPHATE 900 MG/50ML
900 INJECTION, SOLUTION INTRAVENOUS ONCE
Status: DISCONTINUED | OUTPATIENT
Start: 2024-08-11 | End: 2024-08-11

## 2024-08-11 RX ORDER — LIDOCAINE HYDROCHLORIDE 20 MG/ML
1 JELLY TOPICAL ONCE
Status: COMPLETED | OUTPATIENT
Start: 2024-08-11 | End: 2024-08-11

## 2024-08-11 RX ORDER — HYDROXYZINE HYDROCHLORIDE 25 MG/1
25 TABLET, FILM COATED ORAL EVERY 4 HOURS PRN
Status: DISCONTINUED | OUTPATIENT
Start: 2024-08-11 | End: 2024-08-13 | Stop reason: HOSPADM

## 2024-08-11 RX ORDER — LIDOCAINE HYDROCHLORIDE 20 MG/ML
1 JELLY TOPICAL DAILY PRN
Status: DISCONTINUED | OUTPATIENT
Start: 2024-08-11 | End: 2024-08-13 | Stop reason: HOSPADM

## 2024-08-11 RX ORDER — KETOROLAC TROMETHAMINE 15 MG/ML
15 INJECTION, SOLUTION INTRAMUSCULAR; INTRAVENOUS ONCE
Status: COMPLETED | OUTPATIENT
Start: 2024-08-11 | End: 2024-08-11

## 2024-08-11 SDOH — SOCIAL STABILITY: SOCIAL INSECURITY: ABUSE: ADULT

## 2024-08-11 SDOH — SOCIAL STABILITY: SOCIAL INSECURITY: ARE THERE ANY APPARENT SIGNS OF INJURIES/BEHAVIORS THAT COULD BE RELATED TO ABUSE/NEGLECT?: NO

## 2024-08-11 SDOH — ECONOMIC STABILITY: HOUSING INSECURITY: IN THE PAST 12 MONTHS, HOW MANY TIMES HAVE YOU MOVED WHERE YOU WERE LIVING?: 0

## 2024-08-11 SDOH — SOCIAL STABILITY: SOCIAL INSECURITY: HAVE YOU HAD THOUGHTS OF HARMING ANYONE ELSE?: NO

## 2024-08-11 SDOH — ECONOMIC STABILITY: HOUSING INSECURITY: AT ANY TIME IN THE PAST 12 MONTHS, WERE YOU HOMELESS OR LIVING IN A SHELTER (INCLUDING NOW)?: NO

## 2024-08-11 SDOH — SOCIAL STABILITY: SOCIAL INSECURITY: DO YOU FEEL ANYONE HAS EXPLOITED OR TAKEN ADVANTAGE OF YOU FINANCIALLY OR OF YOUR PERSONAL PROPERTY?: NO

## 2024-08-11 SDOH — ECONOMIC STABILITY: INCOME INSECURITY: HOW HARD IS IT FOR YOU TO PAY FOR THE VERY BASICS LIKE FOOD, HOUSING, MEDICAL CARE, AND HEATING?: NOT VERY HARD

## 2024-08-11 SDOH — SOCIAL STABILITY: SOCIAL INSECURITY: DO YOU FEEL UNSAFE GOING BACK TO THE PLACE WHERE YOU ARE LIVING?: NO

## 2024-08-11 SDOH — SOCIAL STABILITY: SOCIAL INSECURITY: DOES ANYONE TRY TO KEEP YOU FROM HAVING/CONTACTING OTHER FRIENDS OR DOING THINGS OUTSIDE YOUR HOME?: NO

## 2024-08-11 SDOH — SOCIAL STABILITY: SOCIAL INSECURITY: WERE YOU ABLE TO COMPLETE ALL THE BEHAVIORAL HEALTH SCREENINGS?: YES

## 2024-08-11 SDOH — SOCIAL STABILITY: SOCIAL INSECURITY: HAS ANYONE EVER THREATENED TO HURT YOUR FAMILY OR YOUR PETS?: NO

## 2024-08-11 SDOH — ECONOMIC STABILITY: TRANSPORTATION INSECURITY
IN THE PAST 12 MONTHS, HAS LACK OF TRANSPORTATION KEPT YOU FROM MEETINGS, WORK, OR FROM GETTING THINGS NEEDED FOR DAILY LIVING?: NO

## 2024-08-11 SDOH — ECONOMIC STABILITY: INCOME INSECURITY: IN THE LAST 12 MONTHS, WAS THERE A TIME WHEN YOU WERE NOT ABLE TO PAY THE MORTGAGE OR RENT ON TIME?: NO

## 2024-08-11 SDOH — ECONOMIC STABILITY: TRANSPORTATION INSECURITY
IN THE PAST 12 MONTHS, HAS THE LACK OF TRANSPORTATION KEPT YOU FROM MEDICAL APPOINTMENTS OR FROM GETTING MEDICATIONS?: NO

## 2024-08-11 SDOH — SOCIAL STABILITY: SOCIAL INSECURITY: ARE YOU OR HAVE YOU BEEN THREATENED OR ABUSED PHYSICALLY, EMOTIONALLY, OR SEXUALLY BY ANYONE?: NO

## 2024-08-11 ASSESSMENT — COLUMBIA-SUICIDE SEVERITY RATING SCALE - C-SSRS
2. HAVE YOU ACTUALLY HAD ANY THOUGHTS OF KILLING YOURSELF?: NO
6. HAVE YOU EVER DONE ANYTHING, STARTED TO DO ANYTHING, OR PREPARED TO DO ANYTHING TO END YOUR LIFE?: NO
1. IN THE PAST MONTH, HAVE YOU WISHED YOU WERE DEAD OR WISHED YOU COULD GO TO SLEEP AND NOT WAKE UP?: NO

## 2024-08-11 ASSESSMENT — COGNITIVE AND FUNCTIONAL STATUS - GENERAL
MOBILITY SCORE: 24
DAILY ACTIVITIY SCORE: 24
PATIENT BASELINE BEDBOUND: NO

## 2024-08-11 ASSESSMENT — ACTIVITIES OF DAILY LIVING (ADL)
WALKS IN HOME: INDEPENDENT
DRESSING YOURSELF: INDEPENDENT
JUDGMENT_ADEQUATE_SAFELY_COMPLETE_DAILY_ACTIVITIES: YES
ASSISTIVE_DEVICE: EYEGLASSES
TOILETING: INDEPENDENT
ADEQUATE_TO_COMPLETE_ADL: YES
PATIENT'S MEMORY ADEQUATE TO SAFELY COMPLETE DAILY ACTIVITIES?: YES
GROOMING: INDEPENDENT
FEEDING YOURSELF: INDEPENDENT
HEARING - RIGHT EAR: FUNCTIONAL
BATHING: INDEPENDENT
HEARING - LEFT EAR: FUNCTIONAL

## 2024-08-11 ASSESSMENT — PATIENT HEALTH QUESTIONNAIRE - PHQ9
SUM OF ALL RESPONSES TO PHQ9 QUESTIONS 1 & 2: 1
1. LITTLE INTEREST OR PLEASURE IN DOING THINGS: NOT AT ALL
2. FEELING DOWN, DEPRESSED OR HOPELESS: SEVERAL DAYS

## 2024-08-11 ASSESSMENT — PAIN DESCRIPTION - LOCATION
LOCATION: PENIS
LOCATION: SCROTUM

## 2024-08-11 ASSESSMENT — LIFESTYLE VARIABLES
HAVE PEOPLE ANNOYED YOU BY CRITICIZING YOUR DRINKING: NO
AUDIT-C TOTAL SCORE: 0
SKIP TO QUESTIONS 9-10: 1
HOW MANY STANDARD DRINKS CONTAINING ALCOHOL DO YOU HAVE ON A TYPICAL DAY: PATIENT DOES NOT DRINK
HAVE YOU EVER FELT YOU SHOULD CUT DOWN ON YOUR DRINKING: NO
EVER FELT BAD OR GUILTY ABOUT YOUR DRINKING: NO
HOW OFTEN DO YOU HAVE A DRINK CONTAINING ALCOHOL: NEVER
EVER HAD A DRINK FIRST THING IN THE MORNING TO STEADY YOUR NERVES TO GET RID OF A HANGOVER: NO
AUDIT-C TOTAL SCORE: 0
TOTAL SCORE: 0
HOW OFTEN DO YOU HAVE 6 OR MORE DRINKS ON ONE OCCASION: NEVER

## 2024-08-11 ASSESSMENT — PAIN - FUNCTIONAL ASSESSMENT: PAIN_FUNCTIONAL_ASSESSMENT: 0-10

## 2024-08-11 ASSESSMENT — PAIN SCALES - GENERAL
PAINLEVEL_OUTOF10: 3
PAINLEVEL_OUTOF10: 4
PAINLEVEL_OUTOF10: 0 - NO PAIN
PAINLEVEL_OUTOF10: 0 - NO PAIN

## 2024-08-11 NOTE — ED PROVIDER NOTES
EMERGENCY DEPARTMENT ENCOUNTER      Pt Name: Fracisco Reynoso  MRN: [id number]  Birthdate 1995  Date of evaluation: 8/11/2024  Provider: Walt Gutierrez MD    CHIEF COMPLAINT       Chief Complaint   Patient presents with    Post-op Problem     Rash- itching/ burning. Left radical orch.   Procedure 7/29. Started 2 days after procedure with Dr Villasenor. Placed on Bactrum with PCP, reports not getting any better         HISTORY OF PRESENT ILLNESS    HPI  Patient is 28-year-old male presenting with concern for a rash.  He is status post left orchiectomy with Dr. Catherine on 7/29.  Rash appeared suprapubically first on the area where it was shaved.  He he treated as folliculitis initially using hydrocortisone cream and then Bactrim given by his PCP last week.  This been persistent yellow bruising from the rash.  He now has circumferential swelling and erythema to his penis.  This started approximately 2 days ago and what ultimately brought him to the emergency department.  He denies any discharge or dysuria.  Rash is pruritic and burning but not painful.  He has been managing symptoms with Zyrtec during the day and Benadryl at night.  He denies fevers and is otherwise asymptomatic.  He has not seen Dr. Catherine regarding this.    Nursing Notes were reviewed.    PAST MEDICAL HISTORY     Past Medical History:   Diagnosis Date    Anxiety     Arthralgia, unspecified joint     Bipolar disorder (Multi)     Depression     GERD (gastroesophageal reflux disease)     PONV (postoperative nausea and vomiting)     Seasonal allergies     Undescended testicle          SURGICAL HISTORY       Past Surgical History:   Procedure Laterality Date    UNDESCENDED TESTICLE EXPLORATION      WISDOM TOOTH EXTRACTION           CURRENT MEDICATIONS       Previous Medications    ACETAMINOPHEN (TYLENOL) 325 MG TABLET    Take 500 mg by mouth every 6 hours if needed for mild pain (1 - 3).    CETIRIZINE (ZYRTEC) 10 MG TABLET    Take 1 tablet (10 mg) by  mouth if needed.    CHOLECALCIFEROL (VITAMIN D3) 500 UNIT SPLIT TABLET    Take 2 half tablet (1,000 Units) by mouth once daily.    FAMOTIDINE (PEPCID) 20 MG TABLET    Take 1 tablet (20 mg) by mouth if needed for heartburn.    SULFAMETHOXAZOLE-TRIMETHOPRIM (BACTRIM DS) 800-160 MG TABLET    Take 1 tablet by mouth 2 times a day for 7 days.       ALLERGIES     Patient has no known allergies.    FAMILY HISTORY       Family History   Problem Relation Name Age of Onset    Anxiety disorder Mother      Obesity Mother      Depression Mother      No Known Problems Father      No Known Problems Brother            SOCIAL HISTORY       Social History     Socioeconomic History    Marital status: Single   Occupational History    Occupation: Medical Scribe  for ; Host at [employer]   Tobacco Use    Smoking status: Former     Current packs/day: 0.00     Types: Cigarettes     Quit date: 2023     Years since quittin.5    Smokeless tobacco: Never   Vaping Use    Vaping status: Some Days   Substance and Sexual Activity    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     Drug use: Never    Sexual activity: Not Currently     Partners: Male     Birth control/protection: None     Social Determinants of Health     Financial Resource Strain: Not on File (2021)    [facility] from [facility]    Financial Resource Strain     Financial Resource Strain: 0   Food Insecurity: Not on File (2021)    Received from [facility]    Food Insecurity     Food: 0   Transportation Needs: Not on File (2021)    Received from [facility]    Transportation Needs     Transportation: 0   Physical Activity: Not on File (2021)    Received from [facility]    Physical Activity     Physical Activity: 0   Stress: Not on File (2021)    Received from [facility]    Stress     Stress: 0   Social Connections: Not on File (2021)    Received from [facility]    Social Connections     Social  Connections and Isolation: 0   Housing Stability: Not on File (2021)    Received from [facility]    Housing Stability     Housin       SCREENINGS                        PHYSICAL EXAM    (up to 7 for level 4, 8 or more for level 5)     ED Triage Vitals [24 1645]   Temperature Heart Rate Respirations BP   36.7 °C (98.1 °F) 90 18 133/79      Pulse Ox Temp Source Heart Rate Source Patient Position   100 % Temporal Monitor Sitting      BP Location FiO2 (%)     Right arm --       Physical Exam  Vitals and nursing note reviewed.   Constitutional:       General: He is not in acute distress.     Appearance: He is not toxic-appearing.   HENT:      Head: Normocephalic and atraumatic.      Nose: Nose normal.      Mouth/Throat:      Mouth: Mucous membranes are moist.      Pharynx: Oropharynx is clear.   Eyes:      Extraocular Movements: Extraocular movements intact.      Conjunctiva/sclera: Conjunctivae normal.   Cardiovascular:      Rate and Rhythm: Normal rate and regular rhythm.      Heart sounds: Normal heart sounds.   Pulmonary:      Effort: Pulmonary effort is normal. No respiratory distress.      Breath sounds: Normal breath sounds.   Abdominal:      General: There is no distension.      Palpations: Abdomen is soft.      Tenderness: There is no abdominal tenderness.   Genitourinary:     Comments: Swollen inflamed circumferential swelling of the penis.  No discharge.  Cremaster reflex intact on the right.  Surgically absent left testicle.  Suprapubic incision appears clean, dry, intact.  Oozing yellow fluid intermittently seen throughout the rash field.  No crepitus, induration, fluctuance.  Musculoskeletal:         General: No deformity or signs of injury.      Cervical back: Normal range of motion and neck supple.      Right lower leg: No edema.      Left lower leg: No edema.   Skin:     General: Skin is warm and dry.      Capillary Refill: Capillary refill takes less than 2 seconds.      Comments:  Pruritic erythematous none confluent rash suprapubically   Neurological:      General: No focal deficit present.          DIAGNOSTIC RESULTS     LABS:  Labs Reviewed   CBC WITH AUTO DIFFERENTIAL - Abnormal       Result Value    WBC 9.3      nRBC 0.0      RBC 5.67      Hemoglobin 14.5      Hematocrit 45.6      MCV 80      MCH 25.6 (*)     MCHC 31.8 (*)     RDW 13.5      Platelets 409      Neutrophils % 61.4      Immature Granulocytes %, Automated 0.2      Lymphocytes % 25.5      Monocytes % 6.5      Eosinophils % 5.3      Basophils % 1.1      Neutrophils Absolute 5.68      Immature Granulocytes Absolute, Automated 0.02      Lymphocytes Absolute 2.36      Monocytes Absolute 0.60      Eosinophils Absolute 0.49      Basophils Absolute 0.10     COMPREHENSIVE METABOLIC PANEL - Abnormal    Glucose 85      Sodium 136      Potassium 4.4      Chloride 101      Bicarbonate 29      Anion Gap 10      Urea Nitrogen 15      Creatinine 1.08      eGFR >90      Calcium 9.3      Albumin 4.5      Alkaline Phosphatase 53      Total Protein 7.5      AST 47 (*)     Bilirubin, Total 0.5      ALT 69 (*)    URINALYSIS WITH REFLEX CULTURE AND MICROSCOPIC - Abnormal    Color, Urine Colorless (*)     Appearance, Urine Clear      Specific Gravity, Urine 1.004 (*)     pH, Urine 6.5      Protein, Urine NEGATIVE      Glucose, Urine Normal      Blood, Urine NEGATIVE      Ketones, Urine NEGATIVE      Bilirubin, Urine NEGATIVE      Urobilinogen, Urine Normal      Nitrite, Urine NEGATIVE      Leukocyte Esterase, Urine NEGATIVE     LACTATE - Normal    Lactate 1.2      Narrative:     Venipuncture immediately after or during the administration of Metamizole may lead to falsely low results. Testing should be performed immediately  prior to Metamizole dosing.   BLOOD CULTURE   BLOOD CULTURE   URINALYSIS WITH REFLEX CULTURE AND MICROSCOPIC    Narrative:     The following orders were created for panel order Urinalysis with Reflex Culture and  Microscopic.  Procedure                               Abnormality         Status                     ---------                               -----------         ------                     Urinalysis with Reflex C...[[id number]]  Abnormal            Final result               Extra Urine Gray Tube[[id number]]                            In process                   Please view results for these tests on the individual orders.   EXTRA URINE GRAY TUBE       All other labs were within normal range or not returned as of this dictation.    Imaging  No orders to display        Procedures  Procedures     EMERGENCY DEPARTMENT COURSE/MDM:     Diagnoses as of 08/11/24 2015   Skin infection   Acute urinary retention        Medical Decision Making  History obtained for the patient.  Records including labs, imaging, notes reviewed.  With the patient's consent, pictures were taken and uploaded to his chart.  This was discussed with Dr. Catherine.  He stated that he did not know what this was and recommended admission to the medicine service for further evaluation with infectious disease/dermatology consults as indicated.  This discussed with the patient who agreed.  Line was placed and blood cultures were drawn as well as a lactate which was normal at 1.2.  Patient given a liter fluid bolus and broad-spectrum antibiotics.  CMP, CBC unremarkable.  Patient stated he was having difficulty peeing.  He only had a small volume out and a postvoid residual showed 400 cc in his bladder.  Patient given Toradol for pain and Uro-Jet was utilized and the Acevedo was placed.  Per parents, after placement, reportedly got 1100 cc of urine out.  Patient to be admitted to the medicine service for further evaluation and treatment of rash/infection of unknown etiology.    Patient and or family in agreement and understanding of treatment plan.  All questions answered.      I reviewed the case with the attending ED physician. The attending ED physician  agrees with the plan. Patient and/or patient´s representative was counseled regarding labs, imaging, likely diagnosis, and plan. All questions were answered.    ED Medications administered this visit:    Medications   vancomycin (Vancocin) in  mL IV 1,500 mg (1,500 mg intravenous New Bag 8/11/24 1932)   sodium chloride 0.9 % bolus 1,000 mL (0 mL intravenous Stopped 8/11/24 1924)   cefTRIAXone (Rocephin) 2 g in dextrose (iso) IV 50 mL (0 g intravenous Stopped 8/11/24 1903)   lidocaine 2 % mucosal jelly (Uro-Jet) 1 Application (1 Application urethral Given 8/11/24 1833)   ketorolac (Toradol) injection 15 mg (15 mg intravenous Given 8/11/24 1901)       New Prescriptions from this visit:    New Prescriptions    No medications on file       Follow-up:  No follow-up provider specified.      Final Impression:   1. Skin infection    2. Acute urinary retention          (Please note that portions of this note were completed with a voice recognition program.  Efforts were made to edit the dictations but occasionally words are mis-transcribed.)     Walt Gutierrez MD  Resident  08/11/24 2005       Walt Gutierrez MD  Resident  08/11/24 2006       Walt Gutierrez MD  Resident  08/11/24 2016

## 2024-08-12 PROBLEM — G47.20 CIRCADIAN RHYTHM SLEEP DISORDER, UNSPECIFIED TYPE: Status: ACTIVE | Noted: 2021-06-02

## 2024-08-12 PROBLEM — F41.9 ANXIETY DISORDER, UNSPECIFIED: Status: ACTIVE | Noted: 2021-06-02

## 2024-08-12 PROBLEM — F13.11: Chronic | Status: ACTIVE | Noted: 2021-06-02

## 2024-08-12 PROBLEM — N48.1 BALANITIS: Status: ACTIVE | Noted: 2024-08-12

## 2024-08-12 LAB
ANION GAP SERPL CALC-SCNC: 9 MMOL/L (ref 10–20)
BUN SERPL-MCNC: 14 MG/DL (ref 6–23)
C TRACH RRNA SPEC QL NAA+PROBE: NEGATIVE
CALCIUM SERPL-MCNC: 8.5 MG/DL (ref 8.6–10.3)
CHLORIDE SERPL-SCNC: 106 MMOL/L (ref 98–107)
CO2 SERPL-SCNC: 26 MMOL/L (ref 21–32)
CREAT SERPL-MCNC: 0.92 MG/DL (ref 0.5–1.3)
EGFRCR SERPLBLD CKD-EPI 2021: >90 ML/MIN/1.73M*2
ERYTHROCYTE [DISTWIDTH] IN BLOOD BY AUTOMATED COUNT: 13.6 % (ref 11.5–14.5)
GLUCOSE SERPL-MCNC: 79 MG/DL (ref 74–99)
HCT VFR BLD AUTO: 40.1 % (ref 41–52)
HGB BLD-MCNC: 12.9 G/DL (ref 13.5–17.5)
HOLD SPECIMEN: NORMAL
MCH RBC QN AUTO: 25.5 PG (ref 26–34)
MCHC RBC AUTO-ENTMCNC: 32.2 G/DL (ref 32–36)
MCV RBC AUTO: 79 FL (ref 80–100)
N GONORRHOEA DNA SPEC QL PROBE+SIG AMP: NEGATIVE
NRBC BLD-RTO: 0 /100 WBCS (ref 0–0)
PLATELET # BLD AUTO: 378 X10*3/UL (ref 150–450)
POTASSIUM SERPL-SCNC: 4.2 MMOL/L (ref 3.5–5.3)
RBC # BLD AUTO: 5.05 X10*6/UL (ref 4.5–5.9)
SODIUM SERPL-SCNC: 137 MMOL/L (ref 136–145)
TREPONEMA PALLIDUM IGG+IGM AB [PRESENCE] IN SERUM OR PLASMA BY IMMUNOASSAY: NONREACTIVE
WBC # BLD AUTO: 11.2 X10*3/UL (ref 4.4–11.3)

## 2024-08-12 PROCEDURE — 96367 TX/PROPH/DG ADDL SEQ IV INF: CPT

## 2024-08-12 PROCEDURE — 99222 1ST HOSP IP/OBS MODERATE 55: CPT

## 2024-08-12 PROCEDURE — 1100000001 HC PRIVATE ROOM DAILY

## 2024-08-12 PROCEDURE — 2500000004 HC RX 250 GENERAL PHARMACY W/ HCPCS (ALT 636 FOR OP/ED)

## 2024-08-12 PROCEDURE — 96366 THER/PROPH/DIAG IV INF ADDON: CPT

## 2024-08-12 PROCEDURE — 2500000001 HC RX 250 WO HCPCS SELF ADMINISTERED DRUGS (ALT 637 FOR MEDICARE OP)

## 2024-08-12 PROCEDURE — 85027 COMPLETE CBC AUTOMATED: CPT

## 2024-08-12 PROCEDURE — 36415 COLL VENOUS BLD VENIPUNCTURE: CPT

## 2024-08-12 PROCEDURE — 2500000001 HC RX 250 WO HCPCS SELF ADMINISTERED DRUGS (ALT 637 FOR MEDICARE OP): Performed by: STUDENT IN AN ORGANIZED HEALTH CARE EDUCATION/TRAINING PROGRAM

## 2024-08-12 PROCEDURE — G0378 HOSPITAL OBSERVATION PER HR: HCPCS

## 2024-08-12 PROCEDURE — 82374 ASSAY BLOOD CARBON DIOXIDE: CPT

## 2024-08-12 RX ORDER — DIPHENHYDRAMINE HCL 25 MG
25 TABLET ORAL 3 TIMES DAILY PRN
Status: DISCONTINUED | OUTPATIENT
Start: 2024-08-12 | End: 2024-08-13 | Stop reason: HOSPADM

## 2024-08-12 RX ORDER — CHOLECALCIFEROL (VITAMIN D3) 25 MCG
1000 TABLET ORAL DAILY
COMMUNITY

## 2024-08-12 RX ORDER — FAMOTIDINE 20 MG/1
20 TABLET, FILM COATED ORAL DAILY PRN
Status: DISCONTINUED | OUTPATIENT
Start: 2024-08-12 | End: 2024-08-13 | Stop reason: HOSPADM

## 2024-08-12 RX ORDER — CLOTRIMAZOLE AND BETAMETHASONE DIPROPIONATE 10; .64 MG/G; MG/G
1 CREAM TOPICAL 2 TIMES DAILY
Status: DISCONTINUED | OUTPATIENT
Start: 2024-08-12 | End: 2024-08-13 | Stop reason: HOSPADM

## 2024-08-12 RX ORDER — CEPHALEXIN 500 MG/1
500 CAPSULE ORAL EVERY 6 HOURS
Status: DISCONTINUED | OUTPATIENT
Start: 2024-08-12 | End: 2024-08-13 | Stop reason: HOSPADM

## 2024-08-12 ASSESSMENT — PAIN SCALES - GENERAL
PAINLEVEL_OUTOF10: 0 - NO PAIN
PAINLEVEL_OUTOF10: 0 - NO PAIN

## 2024-08-12 ASSESSMENT — COGNITIVE AND FUNCTIONAL STATUS - GENERAL
DAILY ACTIVITIY SCORE: 24
MOBILITY SCORE: 24

## 2024-08-12 ASSESSMENT — ENCOUNTER SYMPTOMS
EYES NEGATIVE: 1
CARDIOVASCULAR NEGATIVE: 1
GASTROINTESTINAL NEGATIVE: 1
NEUROLOGICAL NEGATIVE: 1
MUSCULOSKELETAL NEGATIVE: 1
RESPIRATORY NEGATIVE: 1
ALLERGIC/IMMUNOLOGIC NEGATIVE: 1
CONSTITUTIONAL NEGATIVE: 1
ROS SKIN COMMENTS: GENITAL RASH
ENDOCRINE NEGATIVE: 1
HEMATOLOGIC/LYMPHATIC NEGATIVE: 1

## 2024-08-12 ASSESSMENT — PAIN - FUNCTIONAL ASSESSMENT: PAIN_FUNCTIONAL_ASSESSMENT: 0-10

## 2024-08-12 NOTE — PROGRESS NOTES
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  Patient said his mom had come to visit him.   provided companionship and was a supportive presence.

## 2024-08-12 NOTE — NURSING NOTE
Pt states he is noticing drainage from his urethra /penis tip at area of sears insertion. Sm amt of purulent drainage noted. Pt and mother instructed to wash sears insertion site with soap and water.

## 2024-08-12 NOTE — CARE PLAN
The patient's goals for the shift include      The clinical goals for the shift include redness and rash to pubic area and penis will not extend or will decrease by en of shift today. pt will remain afebrile    Pt states redness and rash have not changed much today since admission. New lotrimin applied. Acevedo maintained. Not yet seem by ID. Receiving oral Keflex. Ambulated in halls x2.

## 2024-08-12 NOTE — CARE PLAN
The patient's goals for the shift include      The clinical goals for the shift include stable vital signs    Over the shift, the patient did make progress toward the following goals of pain control and stable vital signs.

## 2024-08-12 NOTE — H&P
History Of Present Illness  Fracisco Reynoso is a 28 y.o. male with past medical history of anxiety, undescended left testicle and s/p orchiopexy who is presenting with a genital rash that started 2 days after left radical orchiectomy due to malignant germ cell tumor finding. The rash was accompanied by itchiness, swelling, burning sensation and yellowish drainage. Patient reported that he did take zyrtec, benadryl, hydrocortisone, and bactrim ab course that was prescribed by his PCP with no improvement of symptoms. Patient denies any recent urinary problems/changes at home, hematuria, fever, N/V/D or any pain. Patient endorses discomfort on incisional area.      Past Medical History  He has a past medical history of Anxiety, Arthralgia, unspecified joint, Bipolar disorder (Multi), Depression, GERD (gastroesophageal reflux disease), PONV (postoperative nausea and vomiting), Seasonal allergies, and Undescended testicle.    Surgical History  He has a past surgical history that includes [place] tooth extraction and Undescended testicle exploration.     Social History  He reports that he quit smoking about 18 months ago. His smoking use included cigarettes. He has never used smokeless tobacco. He reports that he does not drink alcohol and does not use drugs.    Family History  Family History   Problem Relation Name Age of Onset    Anxiety disorder Mother      Obesity Mother      Depression Mother      No Known Problems Father      No Known Problems Brother          Allergies  Patient has no known allergies.    Review of Systems   Constitutional: Negative.    HENT: Negative.     Eyes: Negative.    Respiratory: Negative.     Cardiovascular: Negative.    Gastrointestinal: Negative.    Endocrine: Negative.    Genitourinary:  Positive for penile swelling.        Pruritus   Musculoskeletal: Negative.    Skin:  Positive for rash.        Genital rash   Allergic/Immunologic: Negative.    Neurological: Negative.    Hematological:  Negative.         Physical Exam  Vitals reviewed.   Constitutional:       Appearance: Normal appearance.   HENT:      Head: Normocephalic and atraumatic.   Eyes:      Extraocular Movements: Extraocular movements intact.   Cardiovascular:      Rate and Rhythm: Normal rate and regular rhythm.   Pulmonary:      Effort: Pulmonary effort is normal.      Breath sounds: Normal breath sounds.   Abdominal:      General: Abdomen is flat.      Palpations: Abdomen is soft.   Genitourinary:     Comments: Genital erythema/ suprapubic macular rash/ no discharge  Left radical orchiectomy   Acevedo cath in place with 400ml of urine in collection bag  Musculoskeletal:         General: Normal range of motion.   Neurological:      General: No focal deficit present.      Mental Status: He is alert and oriented to person, place, and time.   Psychiatric:         Mood and Affect: Mood normal.          Last Recorded Vitals  /79 (BP Location: Right arm, Patient Position: Sitting)   Pulse 90   Temp 36.7 °C (98.1 °F) (Temporal)   Resp 18   Wt 63.5 kg (140 lb)   SpO2 100%     Relevant Results      Current Facility-Administered Medications:     clindamycin (Cleocin) 900 mg in dextrose 5% IV 50 mL, 900 mg, intravenous, q8h, Timmy Perez DO    [START ON 8/12/2024] piperacillin-tazobactam (Zosyn) 3.375 g in dextrose (iso) IV 50 mL, 3.375 g, intravenous, q8h, Timmy Perez DO    piperacillin-tazobactam (Zosyn) 4.5 g in dextrose (iso)  mL, 4.5 g, intravenous, Once, Timmy Perez DO    [START ON 8/12/2024] polyethylene glycol (Glycolax, Miralax) packet 17 g, 17 g, oral, Daily, Timmy Perez DO    vancomycin (Vancocin) in  mL IV 1,500 mg, 1,500 mg, intravenous, Once, Walt Gutierrez MD     Results for orders placed or performed during the hospital encounter of 08/11/24 (from the past 24 hour(s))   CBC and Auto Differential   Result Value Ref Range    WBC 9.3 4.4 - 11.3 x10*3/uL    nRBC 0.0 0.0 - 0.0 /100 WBCs    RBC 5.67 4.50 -  5.90 x10*6/uL    Hemoglobin 14.5 13.5 - 17.5 g/dL    Hematocrit 45.6 41.0 - 52.0 %    MCV 80 80 - 100 fL    MCH 25.6 (L) 26.0 - 34.0 pg    MCHC 31.8 (L) 32.0 - 36.0 g/dL    RDW 13.5 11.5 - 14.5 %    Platelets 409 150 - 450 x10*3/uL    Neutrophils % 61.4 40.0 - 80.0 %    Immature Granulocytes %, Automated 0.2 0.0 - 0.9 %    Lymphocytes % 25.5 13.0 - 44.0 %    Monocytes % 6.5 2.0 - 10.0 %    Eosinophils % 5.3 0.0 - 6.0 %    Basophils % 1.1 0.0 - 2.0 %    Neutrophils Absolute 5.68 1.20 - 7.70 x10*3/uL    Immature Granulocytes Absolute, Automated 0.02 0.00 - 0.70 x10*3/uL    Lymphocytes Absolute 2.36 1.20 - 4.80 x10*3/uL    Monocytes Absolute 0.60 0.10 - 1.00 x10*3/uL    Eosinophils Absolute 0.49 0.00 - 0.70 x10*3/uL    Basophils Absolute 0.10 0.00 - 0.10 x10*3/uL   Comprehensive metabolic panel   Result Value Ref Range    Glucose 85 74 - 99 mg/dL    Sodium 136 136 - 145 mmol/L    Potassium 4.4 3.5 - 5.3 mmol/L    Chloride 101 98 - 107 mmol/L    Bicarbonate 29 21 - 32 mmol/L    Anion Gap 10 10 - 20 mmol/L    Urea Nitrogen 15 6 - 23 mg/dL    Creatinine 1.08 0.50 - 1.30 mg/dL    eGFR >90 >60 mL/min/1.73m*2    Calcium 9.3 8.6 - 10.3 mg/dL    Albumin 4.5 3.4 - 5.0 g/dL    Alkaline Phosphatase 53 33 - 120 U/L    Total Protein 7.5 6.4 - 8.2 g/dL    AST 47 (H) 9 - 39 U/L    Bilirubin, Total 0.5 0.0 - 1.2 mg/dL    ALT 69 (H) 10 - 52 U/L   Lactate   Result Value Ref Range    Lactate 1.2 0.4 - 2.0 mmol/L   Urinalysis with Reflex Culture and Microscopic   Result Value Ref Range    Color, Urine Colorless (N) Light-Yellow, Yellow, Dark-Yellow    Appearance, Urine Clear Clear    Specific Gravity, Urine 1.004 (N) 1.005 - 1.035    pH, Urine 6.5 5.0, 5.5, 6.0, 6.5, 7.0, 7.5, 8.0    Protein, Urine NEGATIVE NEGATIVE, 10 (TRACE), 20 (TRACE) mg/dL    Glucose, Urine Normal Normal mg/dL    Blood, Urine NEGATIVE NEGATIVE    Ketones, Urine NEGATIVE NEGATIVE mg/dL    Bilirubin, Urine NEGATIVE NEGATIVE    Urobilinogen, Urine Normal Normal mg/dL     Nitrite, Urine NEGATIVE NEGATIVE    Leukocyte Esterase, Urine NEGATIVE NEGATIVE   In the ED, patient was hemodynamically stable and labs revealed no major electrolyte disturbances, no leukocytosis, and negative U/A. Patient was started on broad spectrum antibiotic, they ordered IV fluids and sears cath was placed due to a difficulty voiding. Case was discussed with the urologist who performed the left radical orchiectomy on 7/29 and he recommended admission for further treatment of infection of unknown etiology and consults as indicated.   Assessment/Plan     This a 28 y.o. male with past medical history of RAMO, cryptorchidism and s/p orchiopexy who is presenting afebrile with suprapubic macular rash that started 2 days after left radical orchiectomy. This macular rash is worsening and was associated with pruritus, swelling, burning sensation and yellowish drainage. On evaluation, he was in NAD with clinical findings of genital erythema, no discharge and sears cath in place. Patient is single and is not sexually active. Patient is NPO for any possible procedures tomorrow, will monitor possible urinary retention and continue IV antibiotics for suspected genital infection.     #Suspected genital infection   #Suprapubic macular rash s/p left radical orchiectomy  #Suspected urinary retention  #Genital erythema/swelling/pruritus  -Started on Vancomycin, Clindamycin and Zosyn  -Monitor labs and signs of infection  -Started on Atarax for pruritus and Toradol PRN for pain   -Urologist consult    #Chronic problems  #RAMO  -Continue home medications, as needed    Code status: Full code  Diet: NPO      Eileen Bettencourt MD    -------------------------Senior addendum ----------------------------------------------  I saw and evaluated patient independently of the intern oversaw all aspects of the work-up.    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  Patient reports mild pruritus and burning to the site of the rash but denies abdominal pain, nausea/vomiting, fever/chills, dysuria, or penile discharge.  He denies recent sexual activity or concern for STI.  He has been taking topical hydrocortisone, Zyrtec, and Benadryl at home without improvement in rash.  He was also prescribed a course of Bactrim by PCP which did not resolve symptoms, then started  In the ED, he was hemodynamically stable and afebrile on room air.  Laboratory evaluation included CBC which was negative for leukocytosis and lactate which was WNL at 1.2.  CMP was negative for electrolyte abnormality, significant for mild elevation in ALT/AST at 69/47.  UA was negative for signs of infection. Urology Dr. Catherine was consulted from the ED and recommended blood cultures and broad-spectrum antibiotics with admission to medicine.  In the ED, patient also had acute urinary retention and Acevedo catheter was placed after urology recommendation.  Patient was treated with Rocephin and vancomycin and a dose of IV Toradol and admitted to the medicine teaching service.  Patient reports mild pruritus/burning pain to penis and scrotum but denies any other systemic symptoms. On physical exam, patient is nontoxic appearing in no acute distress, lungs are CTAB, heart is regular rate and rhythm, abdomen is soft and non-tender, with macular erythematous rash to left suprapubic area surrounding surgical incision which is intact; left testicle is surgically absent however diffuse erythema to scrotum and penis with circumferential penile swelling, no exudate or discharge.  No palpable abscess to surgical site or to scrotum.  Images are available in the media tab in Epic. Overall impression is  cellulitis vs surgical site infection vs contact dermatitis.  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  Will follow blood/urine cultures and monitor fever/WBC curve and await further urology input.  Will also screen for STIs at this time and treat with hydroxyzine PRN for pruritis and Toradol PRN for pain. Of note, pt reports being sober for about 3 years and wishes to avoid narcotic pain medication.     To be discussed with attending physician Dr. Grace in the am.   Timmy Perez,    PGY-3, FM

## 2024-08-12 NOTE — PROGRESS NOTES
08/12/24 1151   Discharge Planning   Living Arrangements Friends   Support Systems Spouse/significant other;Family members;Friends/neighbors   Type of Residence Private residence   Home or Post Acute Services None   Expected Discharge Disposition Home     Met with patient at bedside. Admitted for post-op cellulitis. Pt lives with 2 roommates and was independent PTA with no HHC or DME. PCP is Maren Salazar. Pt feels he is able to manage his health and understands his medications. Pt is able to drive and obtain medications. Pt plans to return home with no new discharge needs.

## 2024-08-12 NOTE — CONSULTS
[facility]  Consult Note    CC:   Chief Complaint   Patient presents with    Post-op Problem     Rash- itching/ burning. Left radical orch.   Procedure . Started 2 days after procedure with Dr Villasenor. Placed on Bactrum with PCP, reports not getting any better     HPI: Fracisco Reynoso is a 28 y.o. malewho I have been consulted to see regarding cellulitis status post radical orchiectomy.    Interval Hx: 28-year-old white male known to me.  Underwent left orchiectomy on 2024.  Procedure was uneventful and he was discharged home the same day.  The following week, he presented to his primary care physician with increased redness, itching, and discomfort over the incision.  He was treated with Bactrim.  Patient reports that did not really improve with the Bactrim therapy.  Then noted increased swelling into the scrotum and penis along with yellowish superficial discoloration around the incision.  Denies purulent drainage.  Denies fever or chills.  Denies pain at the incision site.  Prior history of a left undescended testicle.  Prior history of alcohol abuse now sober    ROS: 12pt ROS otherwise negative unless stated above in HPI      Past Medical History:   Diagnosis Date    Anxiety     Arthralgia, unspecified joint     Bipolar disorder (Multi)     Depression     GERD (gastroesophageal reflux disease)     PONV (postoperative nausea and vomiting)     Seasonal allergies     Undescended testicle      Past Surgical History:   Procedure Laterality Date    UNDESCENDED TESTICLE EXPLORATION      WISDOM TOOTH EXTRACTION       Social History     Socioeconomic History    Marital status: Single   Occupational History    Occupation: Medical Scribe  for ; [place] [employer] [place]   Tobacco Use    Smoking status: Former     Current packs/day: 0.00     Types: Cigarettes     Quit date: 2023     Years since quittin.5    Smokeless tobacco: Never   Vaping Use    Vaping status: Some Days    Substance and Sexual Activity    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 october 4    Drug use: Never    Sexual activity: Not Currently     Partners: Male     Birth control/protection: None     Social Determinants of Health     Financial Resource Strain: Low Risk  (8/11/2024)    Overall Financial Resource Strain (CARDIA)     Difficulty of Paying Living Expenses: Not very hard   Food Insecurity: Not on File (5/20/2021)    Received from [facility]    Food Insecurity     Food: 0   Transportation Needs: No Transportation Needs (8/11/2024)    PRAPARE - Transportation     Lack of Transportation (Medical): No     Lack of Transportation (Non-Medical): No   Physical Activity: Not on File (5/20/2021)    Received from [facility]    Physical Activity     Physical Activity: 0   Stress: Not on File (5/20/2021)    Received from [facility]    Stress     Stress: 0   Social Connections: Not on File (5/20/2021)    Received from [facility]    Social Connections     Social Connections and Isolation: 0   Housing Stability: Low Risk  (8/11/2024)    Housing Stability Vital Sign     Unable to Pay for Housing in the Last Year: No     Number of Times Moved in the Last Year: 0     Homeless in the Last Year: No     Family History   Problem Relation Name Age of Onset    Anxiety disorder Mother      Obesity Mother      Depression Mother      No Known Problems Father      No Known Problems Brother       No Known Allergies  Scheduled medications  clotrimazole-betamethasone, 1 Application, Topical, BID  polyethylene glycol, 17 g, oral, Daily      Continuous medications     PRN medications  PRN medications: diphenhydrAMINE, famotidine, hydrOXYzine HCL, ketorolac, lidocaine    Objective                                                                                                                                   Intake/Output Summary (Last 24 hours) at 8/12/2024 1508  Last data filed at 8/12/2024 0956  Gross per 24 hour   Intake 9476  ml   Output 3300 ml   Net -1151 ml     Lab Results   Component Value Date    WBC 11.2 08/12/2024    HGB 12.9 (L) 08/12/2024    HCT 40.1 (L) 08/12/2024    MCV 79 (L) 08/12/2024     08/12/2024     Lab Results   Component Value Date    GLUCOSE 79 08/12/2024    CALCIUM 8.5 (L) 08/12/2024     08/12/2024    K 4.2 08/12/2024    CO2 26 08/12/2024     08/12/2024    BUN 14 08/12/2024    CREATININE 0.92 08/12/2024     Lab Results   Component Value Date    ALT 69 (H) 08/11/2024    AST 47 (H) 08/11/2024    ALKPHOS 53 08/11/2024    BILITOT 0.5 08/11/2024       Physical Exam                                                                                                                      Vitals:    08/12/24 1145   BP: 114/68   Pulse: 57   Resp: 16   Temp: 36.9 °C (98.4 °F)   SpO2: 98%       General: in NAD, appears stated age  Head: normocephalic, atraumatic  Neck: supple; trachea is midline  Respiratory: normal effort, no use of accessory muscles  Cardiovascular: no peripheral edema  Abdomen: soft, nondistended, nontender, no rebound or guarding, no organomegaly, no CVA tenderness, no hernia  Lymphatic: no lymphadenopathy noted  Skin: normal turgor, no rashes  Neurologic: grossly intact, oriented to person/place/time  Psychiatric: mode and affect appropriate  : Left groin incision intact without drainage.  Surrounding erythema with yellowish discoloration around the incision.  Not warm or tender to touch.  No fluctuance    Assessment & Plan                                                                                                              Fracisco Reynoso is a 28 y.o. male     Impression:  1.  Cellulitis with concern for staph infection  2.  Urinary retention    Plan:    Maintain Acevedo catheter.  Will consider trial of void tomorrow.  Continue Zosyn and vancomycin.  ID consultation for recommendations regarding antibiotics.  Continue other supportive care per    Reviewed and approved by VICKEY YUEN  T on 8/12/24 at 3:08 PM.

## 2024-08-12 NOTE — PROGRESS NOTES
Fracisco Reynoso is a 28 y.o. male on day 1 of admission presenting with Skin infection.      Subjective   Pt was seen and examined at bedside this morning, No acute event overnight. He denies fever , nausea, vomiting. His rash at surgical area and scrotum still itchy and burning but no pain.    Objective     Last Recorded Vitals  /58 (BP Location: Right arm, Patient Position: Lying)   Pulse 58   Temp 36.5 °C (97.7 °F) (Temporal)   Resp 16   Wt 63.5 kg (140 lb)   SpO2 97%   Intake/Output last 3 Shifts:    Intake/Output Summary (Last 24 hours) at 8/12/2024 1044  Last data filed at 8/12/2024 0800  Gross per 24 hour   Intake 1999 ml   Output 3300 ml   Net -1301 ml       Admission Weight  Weight: 63.5 kg (140 lb) (08/11/24 1645)    Daily Weight  08/11/24 : 63.5 kg (140 lb)    Image Results  ECG 12 lead  Normal sinus rhythm  Normal ECG  When compared with ECG of 08-NOV-2020 00:29,  PREVIOUS ECG IS PRESENT  Confirmed by Meño Whyte ([id number]) on 7/22/2024 9:41:59 PM      Physical Exam  Constitutional:       Appearance: Normal appearance.   HENT:      Head: Normocephalic and atraumatic.   Cardiovascular:      Rate and Rhythm: Normal rate.      Pulses: Normal pulses.      Heart sounds: Normal heart sounds. No murmur heard.     No gallop.   Pulmonary:      Effort: Pulmonary effort is normal.      Breath sounds: Normal breath sounds. No wheezing or rales.   Abdominal:      General: Abdomen is flat.      Tenderness: There is no abdominal tenderness. There is no guarding.   Genitourinary:     Comments: There is erythema at surgical site in left suprapubic area , the erythema extend down to scrotum and penis which are swollen. No tenderness at this area. There is yellow crusting on top of surgical site. No discharge  Musculoskeletal:      Right lower leg: No edema.      Left lower leg: No edema.   Skin:     General: Skin is warm and dry.   Neurological:      General: No focal deficit present.      Mental Status: He is alert  and oriented to person, place, and time.   Psychiatric:         Mood and Affect: Mood normal.         Behavior: Behavior normal.       Relevant Results  Scheduled medications  polyethylene glycol, 17 g, oral, Daily      Continuous medications     PRN medications  PRN medications: famotidine, hydrOXYzine HCL, ketorolac, lidocaine  Results for orders placed or performed during the hospital encounter of 08/11/24 (from the past 24 hour(s))   CBC and Auto Differential   Result Value Ref Range    WBC 9.3 4.4 - 11.3 x10*3/uL    nRBC 0.0 0.0 - 0.0 /100 WBCs    RBC 5.67 4.50 - 5.90 x10*6/uL    Hemoglobin 14.5 13.5 - 17.5 g/dL    Hematocrit 45.6 41.0 - 52.0 %    MCV 80 80 - 100 fL    MCH 25.6 (L) 26.0 - 34.0 pg    MCHC 31.8 (L) 32.0 - 36.0 g/dL    RDW 13.5 11.5 - 14.5 %    Platelets 409 150 - 450 x10*3/uL    Neutrophils % 61.4 40.0 - 80.0 %    Immature Granulocytes %, Automated 0.2 0.0 - 0.9 %    Lymphocytes % 25.5 13.0 - 44.0 %    Monocytes % 6.5 2.0 - 10.0 %    Eosinophils % 5.3 0.0 - 6.0 %    Basophils % 1.1 0.0 - 2.0 %    Neutrophils Absolute 5.68 1.20 - 7.70 x10*3/uL    Immature Granulocytes Absolute, Automated 0.02 0.00 - 0.70 x10*3/uL    Lymphocytes Absolute 2.36 1.20 - 4.80 x10*3/uL    Monocytes Absolute 0.60 0.10 - 1.00 x10*3/uL    Eosinophils Absolute 0.49 0.00 - 0.70 x10*3/uL    Basophils Absolute 0.10 0.00 - 0.10 x10*3/uL   Comprehensive metabolic panel   Result Value Ref Range    Glucose 85 74 - 99 mg/dL    Sodium 136 136 - 145 mmol/L    Potassium 4.4 3.5 - 5.3 mmol/L    Chloride 101 98 - 107 mmol/L    Bicarbonate 29 21 - 32 mmol/L    Anion Gap 10 10 - 20 mmol/L    Urea Nitrogen 15 6 - 23 mg/dL    Creatinine 1.08 0.50 - 1.30 mg/dL    eGFR >90 >60 mL/min/1.73m*2    Calcium 9.3 8.6 - 10.3 mg/dL    Albumin 4.5 3.4 - 5.0 g/dL    Alkaline Phosphatase 53 33 - 120 U/L    Total Protein 7.5 6.4 - 8.2 g/dL    AST 47 (H) 9 - 39 U/L    Bilirubin, Total 0.5 0.0 - 1.2 mg/dL    ALT 69 (H) 10 - 52 U/L   Blood Culture     Specimen: Peripheral Venipuncture; Blood culture   Result Value Ref Range    Blood Culture Loaded on Instrument - Culture in progress    Lactate   Result Value Ref Range    Lactate 1.2 0.4 - 2.0 mmol/L   HIV 1/2 Antigen/Antibody Screen with Reflex to Confirmation   Result Value Ref Range    HIV 1/2 Antigen/Antibody Screen with Reflex to Confirmation Nonreactive Nonreactive   Blood Culture    Specimen: Peripheral Venipuncture; Blood culture   Result Value Ref Range    Blood Culture Loaded on Instrument - Culture in progress    Urinalysis with Reflex Culture and Microscopic   Result Value Ref Range    Color, Urine Colorless (N) Light-Yellow, Yellow, Dark-Yellow    Appearance, Urine Clear Clear    Specific Gravity, Urine 1.004 (N) 1.005 - 1.035    pH, Urine 6.5 5.0, 5.5, 6.0, 6.5, 7.0, 7.5, 8.0    Protein, Urine NEGATIVE NEGATIVE, 10 (TRACE), 20 (TRACE) mg/dL    Glucose, Urine Normal Normal mg/dL    Blood, Urine NEGATIVE NEGATIVE    Ketones, Urine NEGATIVE NEGATIVE mg/dL    Bilirubin, Urine NEGATIVE NEGATIVE    Urobilinogen, Urine Normal Normal mg/dL    Nitrite, Urine NEGATIVE NEGATIVE    Leukocyte Esterase, Urine NEGATIVE NEGATIVE   Extra Urine Gray Tube   Result Value Ref Range    Extra Tube Hold for add-ons.    C. Trachomatis / N. Gonorrhoeae, Amplified Detection   Result Value Ref Range    Neisseria gonorrhea,Amplified Negative Negative    Chlamydia trachomatis, Amplified Negative Negative   Syphilis Screen with Reflex   Result Value Ref Range    Syphilis Total Ab Nonreactive Nonreactive   Lavender Top   Result Value Ref Range    Extra Tube Hold for add-ons.    Basic metabolic panel   Result Value Ref Range    Glucose 79 74 - 99 mg/dL    Sodium 137 136 - 145 mmol/L    Potassium 4.2 3.5 - 5.3 mmol/L    Chloride 106 98 - 107 mmol/L    Bicarbonate 26 21 - 32 mmol/L    Anion Gap 9 (L) 10 - 20 mmol/L    Urea Nitrogen 14 6 - 23 mg/dL    Creatinine 0.92 0.50 - 1.30 mg/dL    eGFR >90 >60 mL/min/1.73m*2    Calcium 8.5 (L)  8.6 - 10.3 mg/dL   CBC   Result Value Ref Range    WBC 11.2 4.4 - 11.3 x10*3/uL    nRBC 0.0 0.0 - 0.0 /100 WBCs    RBC 5.05 4.50 - 5.90 x10*6/uL    Hemoglobin 12.9 (L) 13.5 - 17.5 g/dL    Hematocrit 40.1 (L) 41.0 - 52.0 %    MCV 79 (L) 80 - 100 fL    MCH 25.5 (L) 26.0 - 34.0 pg    MCHC 32.2 32.0 - 36.0 g/dL    RDW 13.6 11.5 - 14.5 %    Platelets 378 150 - 450 x10*3/uL     ECG 12 lead    Result Date: 7/22/2024  Normal sinus rhythm Normal ECG When compared with ECG of 08-NOV-2020 00:29, PREVIOUS ECG IS PRESENT Confirmed by Meño Whyte ([id number]) on 7/22/2024 9:41:59 PM    CT chest abdomen pelvis w IV contrast    Result Date: 7/19/2024  Interpreted By:  aKsi Finnegan, STUDY: CT CHEST ABDOMEN PELVIS W IV CONTRAST;  7/19/2024 10:48 am   INDICATION: Signs/Symptoms:Testicular mass.   COMPARISON: Scrotal ultrasound 9 July 2024; CT chest, abdomen and pelvis with contrast 8 November 2020   ACCESSION NUMBER(S): [id number]   ORDERING CLINICIAN: VICKEY YUEN   TECHNIQUE: CT chest, abdomen and pelvis without oral contrast, after the uneventful intravenous administration of 75 mL intravenous contrast (Omnipaque 350)   FINDINGS: CT CHEST   LUNGS / AIRSPACES / AIRWAYS:   LARGE AIRWAYS Filling defect: Negative Wall thickening: Negative Bronchiectasis: Negative Other: N/A   AIRSPACES Fibrosis: Negative Emphysema: Negative Consolidation: Negative Ground glass airspace disease: Negative Edema: Negative Nodule / Mass: Negative Other: N/A   PLEURA: Effusion: Both sides negative Pneumothorax: Both sides negative Other: n/a   CARDIOVASCULAR: Heart size: Within normal limits Pericardial effusion: Negative Thoracic aortic aneurysm: Negative Pulmonary arteries ectasia: Negative Central acute pulmonary embolism: Negative.  The central main pulmonary arteries have no filling defects to suggest acute PE/s. The lobar and more distal branches of the pulmonary arteries are not evaluated Heart failure change: Negative.  No sign of interstitial or  alveolar edema. Other: n/a   NONVASCULAR MEDIASTINUM: Esophagus: Grossly normal by CT Mediastinal Mass: Negative Hiatal hernia: None   LYMPH NODES: No thoracic adenopathy   CHEST WALL: Soft tissues of the chest wall are unremarkable   SKELETON: No acute findings including no aggressive osseous lesion suspect for metastatic disease or other neoplasm   -------   CT ABDOMEN AND PELVIS   LIVER: Neither size nor CT appearance of very likely enhancing 15-16 mm right lobe lesion (today's exam series 201, image 103) is significantly different from 8 November 2020, therefore nearly certainly a hemangioma as described on prior report. Elsewhere in the liver, a few simple cysts (one or two of which may have just minimally enlarged) and a few subcentimeter too small to characterize low-attenuation lesions all remain of doubtful clinical consequence, not suspect for metastatic disease   SPLEEN: Normal. No enlargement, mass or evidence of splenic vein thrombosis.   PANCREAS: Normal. No CT evidence of acute or chronic pancreatitis. No duct dilation. No mass.   GALLBLADDER: Normal CT appearance. No dilation, calcified, or gas-containing stones.  Other types of gallstones could be occult on CT and detectable only by ultrasound.   BILE DUCTS: Normal. No biliary duct dilation.   ADRENAL GLANDS: Normal. No nodule or mass.   KIDNEYS AND URETERS: Normal.  No hydronephrosis on either side.  No mass.  Symmetric enhancement.  No infarct or CT evidence of acute pyelonephritis.  No substantial radiodense stone.  Tiny stones and radiolucent stones could be occult on CT.   LYMPH NODES: No adenopathy, intraperitoneal, retroperitoneal, pelvic or otherwise   APPENDIX: The appendix is not definitively localized; however, no inflammatory process is suspected in the right lower quadrant or elsewhere.   COLON: Normal. No sign of acute diverticulitis or other colitis. No annular constricting mass.   SMALL BOWEL: Normal. No small bowel dilation or any  other sign of small bowel obstruction.   STOMACH / DUODENUM: Grossly normal by CT which has limited sensitivity and specificity for the stomach and duodenum.   RETROPERITONEUM: Normal. No acute hemorrhage or inflammatory change. Lymph nodes in a separate dedicated section.   OMENTUM, MESENTERY AND PERITONEAL SPACES: Free intraperitoneal air: Negative Free intraperitoneal fluid: Trace quantity not acutely hemorrhagic but nevertheless abnormal, was not present 8 November 2020 Abscess: Negative Other: n/a   URINARY BLADDER: Normal. No wall thickening, large diverticula, radiodense stone or surrounding inflammatory change.   PELVIS: The prostate is not significantly enlarged. No pelvic mass, adenopathy or free fluid.   VASCULATURE: No abdominal aortic or iliac artery aneurysm. No high grade stenosis of the major abdominal aortic branch vessels. Portal venous system patent.   ABDOMINAL WALL: Hernia: Negative Other: No acute or contributory abnormality.   SKELETON: No acute findings including no aggressive osseous lesion suspect for metastatic disease or other neoplasm       No compelling evidence for active metastatic disease in the chest, abdomen or pelvis, but there is a trace quantity (probably around 15 mL or less) of not acutely hemorrhagic but nevertheless abnormal free pelvic fluid, was not present on CT 8 November 2020. Etiology and significance uncertain   No lung nodules or masses, thoracic adenopathy, pleural or pericardial effusion   No aggressive osseous lesion anywhere in the skeleton of the chest, abdomen or pelvis   Liver lesion described on CT chest, abdomen and pelvis with contrast 8 November 2020 is unchanged in size and morphology; therefore, it is nearly certainly a hemangioma as suggested on prior CT report   No pelvic or retroperitoneal or any other abdominal/pelvic adenopathy   Liver also contains a few small simple cysts and subcentimeter too small to characterize but likely benign  low-attenuation lesions. One or two of the simple cysts may have just marginally increased in size since 8 November 2020 but they remain of doubtful clinical consequence, not suspect for hepatic metastases   MACRO: None   Signed by: Kasi Finnegan 7/19/2024 11:24 AM Dictation workstation:   [id number]     Assessment/Plan      Principal Problem:    Skin infection    This a 28 y.o. male with past medical history of RAMO, cryptorchidism and s/p orchiopexy who is presenting afebrile with suprapubic macular rash that started 2 days after left radical orchiectomy. This macular rash is worsening and was associated with pruritus, swelling, burning sensation and yellowish drainage. On evaluation, he was in NAD with clinical findings of genital erythema, no discharge and sears cath in place. Patient is single and is not sexually active   -Pt lab is unremarkable today.   -Per physical examination there is erythema at suprapubic area extended down to scrotum and penis which are swollen.the area not tender but itchym there is yellow crusting on top of surgical site but no discharge.  -Urology was consulted and recommended benadryl, and to consult ID for possible of infection.    #Contact dermatitis at genital area VS Fungal infection  #Testicular malignancy S/P Left radical orchiectomy  - PT develop suprapubic  erythema 2 days after radical orchiectomy , now it extend to involve penis and scrotum which are swollen.  -Most likely the cause is allergic or fungal infection. It is less likely to be due to bacterial infection because there is no sign of infection , no pain , no discharge he is vitally stable and his lab unremarkable.  Plan:  -Benadryl 25 mg tablet TID  -Hydrocortisone 0.5% cream.  -Topical Clotrimazole   -Keep area dry and clean  -Stop antibiotics    #Acute urine retention:  - Pt had difficult to pee in ED. Sears catheter was placed. May be due to penis swelling.    #Chronic problems  #RAMO  -Continue home medications,  as nee      Global Plan of Care  IVF: None at this time  Diet: Regular adult diet  DVT prophylaxis: Not needed at this time (low risk)  Bowel regimen: None  Consults: ID  Dispo: Anticipate 1-2 days  Code Status: Full Code      This assessment and plan has been discussed with the senior resident and attending physician.  Roya Bennett MD   Internal Medicine, PGY-1 .

## 2024-08-13 VITALS
TEMPERATURE: 99.1 F | OXYGEN SATURATION: 97 % | HEIGHT: 73 IN | WEIGHT: 140 LBS | RESPIRATION RATE: 16 BRPM | DIASTOLIC BLOOD PRESSURE: 52 MMHG | SYSTOLIC BLOOD PRESSURE: 132 MMHG | HEART RATE: 62 BPM | BODY MASS INDEX: 18.55 KG/M2

## 2024-08-13 LAB
ALBUMIN SERPL BCP-MCNC: 3.7 G/DL (ref 3.4–5)
ANION GAP SERPL CALC-SCNC: 11 MMOL/L (ref 10–20)
BUN SERPL-MCNC: 15 MG/DL (ref 6–23)
CALCIUM SERPL-MCNC: 8.8 MG/DL (ref 8.6–10.3)
CHLORIDE SERPL-SCNC: 101 MMOL/L (ref 98–107)
CO2 SERPL-SCNC: 30 MMOL/L (ref 21–32)
CREAT SERPL-MCNC: 0.94 MG/DL (ref 0.5–1.3)
EGFRCR SERPLBLD CKD-EPI 2021: >90 ML/MIN/1.73M*2
ERYTHROCYTE [DISTWIDTH] IN BLOOD BY AUTOMATED COUNT: 13.6 % (ref 11.5–14.5)
GLUCOSE SERPL-MCNC: 84 MG/DL (ref 74–99)
HCT VFR BLD AUTO: 42.8 % (ref 41–52)
HGB BLD-MCNC: 13.5 G/DL (ref 13.5–17.5)
MAGNESIUM SERPL-MCNC: 1.72 MG/DL (ref 1.6–2.4)
MCH RBC QN AUTO: 25.2 PG (ref 26–34)
MCHC RBC AUTO-ENTMCNC: 31.5 G/DL (ref 32–36)
MCV RBC AUTO: 80 FL (ref 80–100)
NRBC BLD-RTO: 0 /100 WBCS (ref 0–0)
PHOSPHATE SERPL-MCNC: 4.4 MG/DL (ref 2.5–4.9)
PLATELET # BLD AUTO: 393 X10*3/UL (ref 150–450)
POTASSIUM SERPL-SCNC: 4.6 MMOL/L (ref 3.5–5.3)
RBC # BLD AUTO: 5.35 X10*6/UL (ref 4.5–5.9)
SODIUM SERPL-SCNC: 137 MMOL/L (ref 136–145)
WBC # BLD AUTO: 9.1 X10*3/UL (ref 4.4–11.3)

## 2024-08-13 PROCEDURE — 80069 RENAL FUNCTION PANEL: CPT

## 2024-08-13 PROCEDURE — 2500000004 HC RX 250 GENERAL PHARMACY W/ HCPCS (ALT 636 FOR OP/ED): Performed by: UROLOGY

## 2024-08-13 PROCEDURE — 2500000001 HC RX 250 WO HCPCS SELF ADMINISTERED DRUGS (ALT 637 FOR MEDICARE OP): Performed by: STUDENT IN AN ORGANIZED HEALTH CARE EDUCATION/TRAINING PROGRAM

## 2024-08-13 PROCEDURE — G0378 HOSPITAL OBSERVATION PER HR: HCPCS

## 2024-08-13 PROCEDURE — 85027 COMPLETE CBC AUTOMATED: CPT

## 2024-08-13 PROCEDURE — 99238 HOSP IP/OBS DSCHRG MGMT 30/<: CPT

## 2024-08-13 PROCEDURE — 2500000001 HC RX 250 WO HCPCS SELF ADMINISTERED DRUGS (ALT 637 FOR MEDICARE OP)

## 2024-08-13 PROCEDURE — 2500000004 HC RX 250 GENERAL PHARMACY W/ HCPCS (ALT 636 FOR OP/ED)

## 2024-08-13 PROCEDURE — 36415 COLL VENOUS BLD VENIPUNCTURE: CPT

## 2024-08-13 PROCEDURE — 83735 ASSAY OF MAGNESIUM: CPT

## 2024-08-13 RX ORDER — METHYLPREDNISOLONE 4 MG/1
12 TABLET ORAL ONCE
Status: DISCONTINUED | OUTPATIENT
Start: 2024-08-16 | End: 2024-08-13 | Stop reason: HOSPADM

## 2024-08-13 RX ORDER — METHYLPREDNISOLONE 4 MG/1
TABLET ORAL
Qty: 15 TABLET | Refills: 0 | Status: SHIPPED | OUTPATIENT
Start: 2024-08-14 | End: 2024-08-19

## 2024-08-13 RX ORDER — METHYLPREDNISOLONE 4 MG/1
8 TABLET ORAL ONCE
Status: DISCONTINUED | OUTPATIENT
Start: 2024-08-17 | End: 2024-08-13 | Stop reason: HOSPADM

## 2024-08-13 RX ORDER — METHYLPREDNISOLONE 4 MG/1
16 TABLET ORAL ONCE
Status: DISCONTINUED | OUTPATIENT
Start: 2024-08-15 | End: 2024-08-13 | Stop reason: HOSPADM

## 2024-08-13 RX ORDER — METHYLPREDNISOLONE 4 MG/1
4 TABLET ORAL ONCE
Status: DISCONTINUED | OUTPATIENT
Start: 2024-08-18 | End: 2024-08-13 | Stop reason: HOSPADM

## 2024-08-13 RX ORDER — METHYLPREDNISOLONE 4 MG/1
20 TABLET ORAL ONCE
Status: DISCONTINUED | OUTPATIENT
Start: 2024-08-14 | End: 2024-08-13 | Stop reason: HOSPADM

## 2024-08-13 RX ORDER — METHYLPREDNISOLONE 4 MG/1
24 TABLET ORAL ONCE
Status: COMPLETED | OUTPATIENT
Start: 2024-08-13 | End: 2024-08-13

## 2024-08-13 RX ORDER — CLOTRIMAZOLE AND BETAMETHASONE DIPROPIONATE 10; .64 MG/G; MG/G
1 CREAM TOPICAL 2 TIMES DAILY
Qty: 15 G | Refills: 0 | Status: SHIPPED | OUTPATIENT
Start: 2024-08-13

## 2024-08-13 NOTE — HOSPITAL COURSE
Fracisco Reynoso is a 28 y.o. male with past medical history of anxiety, undescended left testicle and s/p orchiopexy who is presenting with a genital rash that started 2 days after left radical orchiectomy due to malignant germ cell tumor finding. The rash was accompanied by itchiness, swelling, burning sensation and yellowish drainage. Patient reported that he did take zyrtec, benadryl, hydrocortisone, and bactrim ab course that was prescribed by his PCP with no improvement of symptoms. Patient denies any recent urinary problems/changes at home, hematuria, fever, N/V/D or any pain. Patient endorses discomfort on incisional area.   In ED he was vitally stable however he had urine retention so sears catheter was inserted.  On physical exam Left groin incision intact without drainage. Surrounding erythema with yellowish discoloration around the incision. Not warm or tender to touch. No fluctuance.  He was admitted for 2 days , during his hospital stay urology was consulted , the pt treated with clotrimazole cream and hydrocortisone cream and benadryl. Pt feel better and the itching decrease. Urology recommended to start methylprednisolone and follow him at out pt clinic. Pt was discharge in good condition.

## 2024-08-13 NOTE — DISCHARGE SUMMARY
Discharge Diagnosis  Allergic reaction at suprapubic area     Issues Requiring Follow-Up  Follow up with your PCP  Follow up with urology    Discharge Meds     Your medication list        START taking these medications        Instructions Last Dose Given Next Dose Due   clotrimazole-betamethasone cream  Commonly known as: Lotrisone      Apply 1 Application topically 2 times a day.       methylPREDNISolone 4 mg tablet  Commonly known as: Medrol  Start taking on: August 14, 2024      Take 5 tablets (20 mg) by mouth once daily for 1 day, THEN 4 tablets (16 mg) once daily for 1 day, THEN 3 tablets (12 mg) once daily for 1 day, THEN 2 tablets (8 mg) once daily for 1 day, THEN 1 tablet (4 mg) once daily for 1 day. Do not fill before August 14, 2024.              CONTINUE taking these medications        Instructions Last Dose Given Next Dose Due   acetaminophen 325 mg tablet  Commonly known as: Tylenol           cetirizine 10 mg tablet  Commonly known as: ZyrTEC           cholecalciferol 25 MCG (1000 UT) tablet  Commonly known as: Vitamin D-3           famotidine 20 mg tablet  Commonly known as: Pepcid                  STOP taking these medications      sulfamethoxazole-trimethoprim 800-160 mg tablet  Commonly known as: Bactrim DS                  Where to Get Your Medications        These medications were sent to [id number] [place] [address] [facility] [address]  [address] [address]      Phone: [phone]   clotrimazole-betamethasone cream  methylPREDNISolone 4 mg tablet         Test Results Pending At Discharge  Pending Labs       Order Current Status    Blood Culture Preliminary result    Blood Culture Preliminary result            Hospital Course   Fracisco Reynoso is a 28 y.o. male with past medical history of anxiety, undescended left testicle and s/p orchiopexy who is presenting with a genital rash that started 2 days after left radical orchiectomy due to malignant  germ cell tumor finding. The rash was accompanied by itchiness, swelling, burning sensation and yellowish drainage. Patient reported that he did take zyrtec, benadryl, hydrocortisone, and bactrim ab course that was prescribed by his PCP with no improvement of symptoms. Patient denies any recent urinary problems/changes at home, hematuria, fever, N/V/D or any pain. Patient endorses discomfort on incisional area. In ED he was vitally stable however he had urine retention so sears catheter was inserted.On physical exam Left groin incision intact without drainage. Surrounding erythema with yellowish discoloration around the incision. Not warm or tender to touch. No fluctuance.He was admitted for 2 days , during his hospital stay urology was consulted , the pt treated with clotrimazole cream and hydrocortisone cream and benadryl. Pt feel better and the itching decrease. Urology recommended to start methylprednisolone and follow him at out pt clinic. Pt was discharge in good condition.    Pertinent Physical Exam At Time of Discharge  Physical Exam  Constitutional:       Appearance: Normal appearance.   Cardiovascular:      Rate and Rhythm: Normal rate and regular rhythm.      Heart sounds: No murmur heard.     No gallop.   Pulmonary:      Effort: Pulmonary effort is normal. No respiratory distress.      Breath sounds: Normal breath sounds. No wheezing or rales.   Abdominal:      General: Abdomen is flat.      Palpations: There is no mass.      Tenderness: There is no abdominal tenderness. There is no guarding.   Genitourinary:     Comments: Left groin incision intact without drainage.  Surrounding erythema with yellowish discoloration around the incision.  Not warm or tender to touch.  No fluctuance  Musculoskeletal:      Cervical back: Normal range of motion.   Neurological:      Mental Status: He is alert.         Outpatient Follow-Up  Future Appointments   Date Time Provider Department Center   8/15/2024  2:10 PM Mateusz  [facility]ACKENZIE [id number] LANE[facility] Bennett MD  Internal medicine, PGY1

## 2024-08-13 NOTE — CARE PLAN
The patient's goals for the shift include      The clinical goals for the shift include pt will demonstrate comfort aeb sleeping in intervals of 3 hours or greater by end of this shift    Over the shift, the patient did  make progress toward the following goals. Pt complained of itching to abd area and pubic, are washed and lotrosome applied, medicated with with atarax x2 this shift with minimal results, pt has been up ambulating in hallway,slept in long intervals , has been  able to make needs known, using ncl as needed and has remained safe this shift

## 2024-08-13 NOTE — PROGRESS NOTES
Fracisco Reynoso is a 28 y.o. male on day 2 of admission presenting with No Principal Problem: There is no principal problem currently on the Problem List. Please update the Problem List and refresh..    Subjective   Patient reports itching has improved slightly.  No pain.  No fever or chills.       Objective     Last Recorded Vitals  Vitals:    08/13/24 0400   BP: 105/60   Pulse: 60   Resp: 16   Temp: 36.3 °C (97.3 °F)   SpO2: 98%       Intake/Output last 3 Shifts:  I/O last 3 completed shifts:  In: 2389 (37.6 mL/kg) [P.O.:1240; IV Piggyback:1149]  Out: 7300 (115 mL/kg) [Urine:7300 (3.2 mL/kg/hr)]  Weight: 63.5 kg     Exam:  General: in NAD, appears stated age  Head: normocephalic, atraumatic  Respiratory: normal effort, no use of accessory muscles  Cardiovascular: no edema noted  Skin: normal turgor, no rashes  Neurologic: grossly intact, oriented to person/place/time  Psychiatric: mode and affect appropriate  : Slight improvement in erythema.  No change in penoscrotal edema.      Relevant Results    Lab Results   Component Value Date    WBC 9.1 08/13/2024    HGB 13.5 08/13/2024    HCT 42.8 08/13/2024    MCV 80 08/13/2024     08/13/2024     Lab Results   Component Value Date    GLUCOSE 84 08/13/2024    CALCIUM 8.8 08/13/2024     08/13/2024    K 4.6 08/13/2024    CO2 30 08/13/2024     08/13/2024    BUN 15 08/13/2024    CREATININE 0.94 08/13/2024                    Assessment/Plan   Active Problems:    Left testicular cancer (Multi)    Allergic reaction    Skin infection    Balanitis    Impression: Contact dermatitis of unclear origin.    Plan: Agree with holding antibiotics.  Will remove Acevedo catheter and give trial of void today.  Start Medrol Dosepak.  Continue to follow      Mateusz Catherine MD

## 2024-08-13 NOTE — DISCHARGE INSTRUCTIONS
Please call and follow up with your primary care provider (PCP)  . And with urology doctor    Please take all of your medications as directed.     New medications:   -Clotrimazole cream  -Betamethasone ointment   -Methylprednisolone as directed  -Benadryl as needed    Discontinued medications:          1-Bactrim         2-keflex                 Please keep genital area clean and dry.    If you have any concerning symptoms, or worsening symptoms please call or return, such as chest pain, shortness of breath, new onset confusion, loss of sensation, or fever >103F.    Thank you for allowing us to participate in your health care.    -[facility].

## 2024-08-14 ENCOUNTER — PATIENT OUTREACH (OUTPATIENT)
Dept: PRIMARY CARE | Facility: CLINIC | Age: 29
End: 2024-08-14
Payer: COMMERCIAL

## 2024-08-14 NOTE — PROGRESS NOTES
Discharge Facility: [facility]  Discharge Diagnosis: Allergic reaction at suprapubic area   Admission Date: 8/11/2024  Discharge Date: 8/13/2024    PCP Appointment Date: none  Specialist Appointment Date:   -urology 8/15/2024    Hospital Encounter and Summary Linked: Yes  See discharge assessment below for further details    Issues Requiring Follow-Up  Follow up with your PCP  Follow up with urology      START taking: clotrimazole-betamethasone (Lotrisone)   methylPREDNISolone (Medrol) Start taking on: August 14, 2024     STOP taking:   sulfamethoxazole-trimethoprim 800-160 mg tablet (Bactrim DS)    Engagement  Call Start Time: 1411 (8/14/2024  2:11 PM)    Medications  Medications reviewed with patient/caregiver?: Yes (8/14/2024  2:11 PM)  Is the patient having any side effects they believe may be caused by any medication additions or changes?: No (8/14/2024  2:11 PM)  Does the patient have all medications ordered at discharge?: Yes (8/14/2024  2:11 PM)  Care Management Interventions: No intervention needed (8/14/2024  2:11 PM)  Prescription Comments: new: medrol dose brandt, lotrisone . stop bactrim ds (8/14/2024  2:11 PM)  Is the patient taking all medications as directed (includes completed medication regime)?: Yes (8/14/2024  2:11 PM)  Medication Comments: see med list (8/14/2024  2:11 PM)    Appointments  Does the patient have a primary care provider?: Yes (8/14/2024  2:11 PM)  Care Management Interventions: Advised patient to make appointment (8/14/2024  2:11 PM)  Has the patient kept scheduled appointments due by today?: Yes (8/14/2024  2:11 PM)  Care Management Interventions: Advised patient to keep appointment (8/14/2024  2:11 PM)    Self Management  What is the home health agency?: none (8/14/2024  2:11 PM)  Has home health visited the patient within 72 hours of discharge?: Not applicable (8/14/2024  2:11 PM)    Patient Teaching  Does the patient have access to their discharge instructions?: Yes (8/14/2024  2:11  PM)  Care Management Interventions: Reviewed instructions with patient (8/14/2024  2:11 PM)  What is the patient's perception of their health status since discharge?: Improving (8/14/2024  2:11 PM)  Is the patient/caregiver able to teach back the hierarchy of who to call/visit for symptoms/problems? PCP, Specialist, Home Health nurse, Urgent Care, ED, 911: Yes (8/14/2024  2:11 PM)    Wrap Up  Wrap Up Additional Comments: CTS spoke with patient. He was admitted to [facility] with an Allergic reaction at suprapubic area on 8/11/2024. Discharge on 8/13/2024 with no home going needs. Patient stated that his rash is looking better since being on the steroids. We did review his medications and he has understanding of additions and changes. Understands his discharge instructions. He did not want to schedule an appt with PCP at this time. He will be following up with urology tomorrow 8/15/2024. Patient voiced no questions or concerns. (8/14/2024  2:11 PM)  Call End Time: 1415 (8/14/2024  2:11 PM)

## 2024-08-15 ENCOUNTER — APPOINTMENT (OUTPATIENT)
Dept: UROLOGY | Facility: CLINIC | Age: 29
End: 2024-08-15
Payer: COMMERCIAL

## 2024-08-15 VITALS
HEIGHT: 73 IN | HEART RATE: 76 BPM | BODY MASS INDEX: 18.05 KG/M2 | WEIGHT: 136.2 LBS | TEMPERATURE: 98 F | DIASTOLIC BLOOD PRESSURE: 69 MMHG | SYSTOLIC BLOOD PRESSURE: 107 MMHG

## 2024-08-15 DIAGNOSIS — C62.92 MALIGNANT NEOPLASM OF LEFT TESTIS, UNSPECIFIED WHETHER DESCENDED OR UNDESCENDED (MULTI): ICD-10-CM

## 2024-08-15 PROCEDURE — 3008F BODY MASS INDEX DOCD: CPT | Performed by: UROLOGY

## 2024-08-15 PROCEDURE — 99024 POSTOP FOLLOW-UP VISIT: CPT | Performed by: UROLOGY

## 2024-08-15 PROCEDURE — 1036F TOBACCO NON-USER: CPT | Performed by: UROLOGY

## 2024-08-15 NOTE — PROGRESS NOTES
Subjective   Patient ID: Fracisco Reynoso is a 28 y.o. male who presents for Testicular Mass (S/p orchiectomy). Patient is s/p left radical orchiectomy on 7/29/24. Patient presented to PCP a few days postop with rash and irritation at surgical wound site. Given a course of Bactrim without improvement. Patient presented to ER 8/11/24 and was admitted. Last seen by me for consult on 8/12/24 when Impression: 1.  Cellulitis with concern for staph infection, 2.  Urinary retention, Plan:  Maintain Acevedo catheter.  Will consider trial of void tomorrow.  Continue Zosyn and vancomycin.  ID consultation for recommendations regarding antibiotics.  Continue other supportive care per    HPI  Patient reports feeling much better. The rash and redness is greatly improved. Patient notes there is peeling skin where the rash was.     Pathology Results  FINAL DIAGNOSIS   A. 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    Objective   Physical Exam  General: in NAD, appears stated age  Head: normocephalic, atraumatic  Respiratory: normal effort, no use of accessory muscles  Cardiovascular: no edema noted  Skin: normal turgor, no rashes  Neurologic: grossly intact, oriented to person/place/time  Psychiatric: mode and affect appropriate     Assessment/Plan     I explained that there are 2 types of testicular cancer, seminoma and  non-seminoma. Within non-seminoma cancers there are several subtypes. Patient's tumor markers were elevated before surgery. It will take a couple of more weeks to see if these values normalize     Assuming the AFP and HCG return to WNL, there are 2 treatment options: 1. Surveillance, or 2. Retroperineal lymph node dissection. I am inclined toward surveillance if the markers normalize.     Patient can now gradually return to normal activity. Provide a work release letter for return to work on August 19, 2024.   Order AFP, HCG. Referral to Dr. Amezquita in medical oncology.       Scribe Attestation  By signing my name below, I, Nathalia Watt   attest that this documentation has been prepared under the direction and in the presence of Mateusz Catherine MD.

## 2024-08-16 LAB
BACTERIA BLD CULT: NORMAL
BACTERIA BLD CULT: NORMAL

## 2024-08-23 ENCOUNTER — APPOINTMENT (OUTPATIENT)
Dept: PRIMARY CARE | Facility: CLINIC | Age: 29
End: 2024-08-23
Payer: COMMERCIAL

## 2024-08-23 VITALS
HEART RATE: 98 BPM | OXYGEN SATURATION: 99 % | BODY MASS INDEX: 18.34 KG/M2 | TEMPERATURE: 97.5 F | WEIGHT: 139 LBS | RESPIRATION RATE: 16 BRPM | SYSTOLIC BLOOD PRESSURE: 108 MMHG | DIASTOLIC BLOOD PRESSURE: 62 MMHG

## 2024-08-23 DIAGNOSIS — C62.02: ICD-10-CM

## 2024-08-23 DIAGNOSIS — T78.40XD ALLERGIC REACTION, SUBSEQUENT ENCOUNTER: Primary | ICD-10-CM

## 2024-08-23 PROCEDURE — 99495 TRANSJ CARE MGMT MOD F2F 14D: CPT | Performed by: FAMILY MEDICINE

## 2024-08-23 NOTE — PROGRESS NOTES
Subjective   Patient ID: Fracisco Reynoso is a 28 y.o. male who presents for Hospital Follow-up (Hospitalized at [facility]).    He states that a few days after we saw him the rash got worse, then the penis started to swell.  He went to [facility] and they sent him to the Er.  They were concerned for infection and they put him on IV antibiotics. He was admitted and put on IV abx and then a couple of days later they started him on steroids and this finally stopped the rash.  He finished the steroid pack about 5 days ago.  The rash hasn't worsened.  There is no more swelling. No more irritation or difficulty urinating.    He will be seeing oncology 9/4 and getting repeat tumor markers done at that time.               Review of Systems    Objective   /62   Pulse 98   Temp 36.4 °C (97.5 °F)   Resp 16   Wt 63 kg (139 lb)   SpO2 99%   BMI 18.34 kg/m²     Physical Exam  Constitutional:       Appearance: Normal appearance.   HENT:      Head: Normocephalic.   Genitourinary:     Penis: Normal.    Skin:     Findings: No erythema or rash.   Neurological:      Mental Status: He is alert.         Assessment/Plan   Diagnoses and all orders for this visit:  Allergic reaction, subsequent encounter  Malignant neoplasm of undescended left testis (Multi)

## 2024-08-27 ENCOUNTER — APPOINTMENT (OUTPATIENT)
Dept: UROLOGY | Facility: CLINIC | Age: 29
End: 2024-08-27
Payer: COMMERCIAL

## 2024-08-27 ENCOUNTER — PATIENT OUTREACH (OUTPATIENT)
Dept: PRIMARY CARE | Facility: CLINIC | Age: 29
End: 2024-08-27

## 2024-08-27 NOTE — PROGRESS NOTES
Unable to reach patient for call back after patient's follow up appointment with PCP. 8/23/2024  M with call back number for patient to call if needed   If no voicemail available call attempts x 2 were made to contact the patient to assist with any questions or concerns patient may have.

## 2024-08-29 ENCOUNTER — PATIENT OUTREACH (OUTPATIENT)
Dept: HEMATOLOGY/ONCOLOGY | Facility: CLINIC | Age: 29
End: 2024-08-29
Payer: COMMERCIAL

## 2024-08-29 NOTE — PROGRESS NOTES
Spoke with this testicular cancer patient, post left radical orchiectomy on 07/29/2024, who is aware of upcoming new patient appointment with Dr. Amezquita on 9/4/2024.  Results in the system.

## 2024-08-30 ENCOUNTER — LAB (OUTPATIENT)
Dept: LAB | Facility: LAB | Age: 29
End: 2024-08-30
Payer: COMMERCIAL

## 2024-08-30 DIAGNOSIS — C62.92 MALIGNANT NEOPLASM OF LEFT TESTIS, UNSPECIFIED WHETHER DESCENDED OR UNDESCENDED (MULTI): ICD-10-CM

## 2024-08-30 LAB
AFP SERPL-MCNC: <4 NG/ML (ref 0–9)
B-HCG SERPL-ACNC: <3 MIU/ML

## 2024-08-30 PROCEDURE — 84702 CHORIONIC GONADOTROPIN TEST: CPT

## 2024-08-30 PROCEDURE — 82105 ALPHA-FETOPROTEIN SERUM: CPT

## 2024-08-30 PROCEDURE — 36415 COLL VENOUS BLD VENIPUNCTURE: CPT

## 2024-09-03 NOTE — PROGRESS NOTES
Oncology New Patient Visit      Patient ID: Fracisco Reynoso is a 28 y.o. male who presents today to [facility].  Referring Physician: Mateusz Catherine MD  [address]  [place] [address]  Primary Care Provider: Maren Salazar DO    Patient Timeline    Date  Event    7/9/24  US scrotum shows 2 suspicious masses in L testicle    ,     7/18/24 CT C/A/P showed no metastases    7/29/24 s/p left radical orchiectomy, stage I (pT1bN0) without LVI, margins negative     8/30/24 Tumor markers negative (HCG <3, AFP <4)    [facility]    Fracisco Reynoso presents accompanied by his mother. The patient reported pain in his left testicle in July 2024 and visited his PCP the following day. An ultrasound ordered by the PCP revealed a mass. The patient was referred to a urologist, who ordered additional lab tests. He subsequently underwent a left radical orchiectomy on July 29, 2024. The postoperative course has been unremarkable except for a generalized rash, which has since resolved. He has returned to his normal daily routine and has no pain or fatigue. He reports no fevers, chills, night sweats, dyspnea, chest pain, abdominal pain, nausea, vomiting, diarrhea, constipation, extremity weakness, neuropathy, skin changes/rash, easy bleeding/bruising, vision changes, or headaches.    ROS    A 14 point review of systems was performed and is negative unless otherwise stated in the HPI    PMH    Past Medical History:   Diagnosis Date    Anxiety     Arthralgia, unspecified joint     Bipolar disorder (Multi)     Depression     GERD (gastroesophageal reflux disease)     PONV (postoperative nausea and vomiting)     Seasonal allergies     Undescended testicle         Medications    Current Outpatient Medications   Medication Instructions    acetaminophen (TYLENOL) 500 mg, oral, Every 6 hours PRN    cetirizine (ZYRTEC) 10 mg, oral, Daily PRN    cholecalciferol (VITAMIN D-3) 1,000 Units, oral, Daily     clotrimazole-betamethasone (Lotrisone) cream 1 Application, Topical, 2 times daily    famotidine (PEPCID) 20 mg, oral, Daily PRN        Fam Hx    Family History   Problem Relation Name Age of Onset    Anxiety disorder Mother      Obesity Mother      Depression Mother      No Known Problems Father      No Known Problems Brother         Social Hx  He lives at home with his room mates. 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 He quit alcohol in  and has been sober since.   Fracisco Reynoso  reports that he quit smoking about 19 months ago. His smoking use included cigarettes. He has never used smokeless tobacco.  He  reports no history of alcohol use.  He  reports no history of drug use.    Objective     Physical Examination    BP 95/60   Pulse 67   Temp 36.1 °C (97 °F)   Resp 17   Wt 62.3 kg (137 lb 5.6 oz)   SpO2 100%   BMI 18.12 kg/m²   BSA: 1.79 meters squared    Performance Status:  Asymptomatic (ECO)    Physical Exam    GENERAL: Well appearing, in no apparent distress  HEENT: No cervical or supraclavicular adenopathy. Mucous membranes moist.  CV: Regular rate and rhythm, Normal S1, S2, no murmurs/rubs/gallops  RESP: Normal work of breathing, CTAB without wheeze or crackles  ABD: Normoactive bowel sounds. Soft, nontender, nondistended.  EXT: Warm and well perfused, no edema  NEURO: A&O x 3, normal gait  SKIN: No visible rashes or bruising.  PSYCH: Normal affect.    Results    Labs  Lab Results   Component Value Date    WBC 9.1 2024    HGB 13.5 2024    HCT 42.8 2024    MCV 80 2024     2024      Lab Results   Component Value Date    GLUCOSE 84 2024    CALCIUM 8.8 2024     2024    K 4.6 2024    CO2 30 2024     2024    BUN 15 2024    CREATININE 0.94 2024      Lab Results   Component Value Date    ALT 69 (H) 2024    AST 47 (H) 2024    ALKPHOS 53 2024    BILITOT 0.5  08/11/2024      Lab Results   Component Value Date    TSH 1.43 11/08/2023      Images    Imaging reviewed in EHR and summarized below:    === 07/19/24 ===    CT CHEST ABDOMEN PELVIS W IV CONTRAST    - Impression -  No compelling evidence for active metastatic disease in the chest, abdomen or pelvis, but there is a trace quantity (probably around 15 mL or less) of not acutely hemorrhagic but nevertheless abnormal free pelvic fluid, was not present on CT 8 November 2020. Etiology and significance uncertain    No lung nodules or masses, thoracic adenopathy, pleural or pericardial effusion    No aggressive osseous lesion anywhere in the skeleton of the chest,  abdomen or pelvis    Liver lesion described on CT chest, abdomen and pelvis with contrast 8 November 2020 is unchanged in size and morphology; therefore, it is nearly certainly a hemangioma as suggested on prior CT report    No pelvic or retroperitoneal or any other abdominal/pelvic adenopathy    Liver also contains a few small simple cysts and subcentimeter too small to characterize but likely benign low-attenuation lesions. 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      Assessment/Plan    Fracisco Reynoso is a 28 y.o. 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 CT C/A/P negative for metastases.     He presents to our clinic to establish care with medical oncology. We discussed management of a stage I NSGCT. Most patients undergo surveillance with labs and imaging at regular intervals. Alternatives  could include chemotherapy (BEP x 1-2 cycles) or RPLND. After discussion, he elected to proceed with surveillance.    # Testicular carcinoma without risk factors, Stage I  - Follows with urology (Dr. Catherine); s/p L orchiectomy  - Surveillance   Year 1: AFP, HCG, LDH every 2 months  CT A/P with IV contrast and chest imaging (CXR or CT chest) every 4 months    The above plan was discussed with the patient and family, and they were in agreement. All questions were answered to their satisfaction.    RV 2 months with labs (CBC, CMP, LDH, HCG, AFP)    The patient was seen, examined and discussed with Dr. Amezquita.    Enrrique Solares MD  Hematology-Oncology Fellow, PGY V     More than 60 minutes were spent in face-to-face encounter, review of medical records, coordination of care, and documentation.     I have reviewed the fellow's note and edited where appropriate. I was present for key portions of the history and physical examination.    Mateusz Amezquita MD, PhD

## 2024-09-04 ENCOUNTER — PATIENT OUTREACH (OUTPATIENT)
Dept: HEMATOLOGY/ONCOLOGY | Facility: HOSPITAL | Age: 29
End: 2024-09-04
Payer: COMMERCIAL

## 2024-09-04 ENCOUNTER — OFFICE VISIT (OUTPATIENT)
Dept: HEMATOLOGY/ONCOLOGY | Facility: HOSPITAL | Age: 29
End: 2024-09-04
Payer: COMMERCIAL

## 2024-09-04 ENCOUNTER — SOCIAL WORK (OUTPATIENT)
Dept: CASE MANAGEMENT | Facility: HOSPITAL | Age: 29
End: 2024-09-04

## 2024-09-04 ENCOUNTER — APPOINTMENT (OUTPATIENT)
Dept: HEMATOLOGY/ONCOLOGY | Facility: HOSPITAL | Age: 29
End: 2024-09-04
Payer: COMMERCIAL

## 2024-09-04 VITALS
BODY MASS INDEX: 18.12 KG/M2 | OXYGEN SATURATION: 100 % | RESPIRATION RATE: 17 BRPM | SYSTOLIC BLOOD PRESSURE: 95 MMHG | WEIGHT: 137.35 LBS | DIASTOLIC BLOOD PRESSURE: 60 MMHG | TEMPERATURE: 97 F | HEART RATE: 67 BPM

## 2024-09-04 DIAGNOSIS — C62.92 MALIGNANT NEOPLASM OF LEFT TESTIS, UNSPECIFIED WHETHER DESCENDED OR UNDESCENDED (MULTI): ICD-10-CM

## 2024-09-04 PROCEDURE — 99205 OFFICE O/P NEW HI 60 MIN: CPT | Performed by: STUDENT IN AN ORGANIZED HEALTH CARE EDUCATION/TRAINING PROGRAM

## 2024-09-04 PROCEDURE — 1036F TOBACCO NON-USER: CPT | Performed by: STUDENT IN AN ORGANIZED HEALTH CARE EDUCATION/TRAINING PROGRAM

## 2024-09-04 PROCEDURE — 99215 OFFICE O/P EST HI 40 MIN: CPT | Performed by: STUDENT IN AN ORGANIZED HEALTH CARE EDUCATION/TRAINING PROGRAM

## 2024-09-04 SDOH — ECONOMIC STABILITY: FOOD INSECURITY: WITHIN THE PAST 12 MONTHS, YOU WORRIED THAT YOUR FOOD WOULD RUN OUT BEFORE YOU GOT MONEY TO BUY MORE.: NEVER TRUE

## 2024-09-04 SDOH — HEALTH STABILITY: PHYSICAL HEALTH: ON AVERAGE, HOW MANY MINUTES DO YOU ENGAGE IN EXERCISE AT THIS LEVEL?: 20 MIN

## 2024-09-04 SDOH — ECONOMIC STABILITY: GENERAL
WHICH OF THE FOLLOWING DO YOU KNOW HOW TO USE AND HAVE ACCESS TO EVERY DAY? (CHOOSE ALL THAT APPLY): DESKTOP COMPUTER, LAPTOP COMPUTER, OR TABLET WITH BROADBAND INTERNET CONNECTION;SMARTPHONE WITH CELLULAR DATA PLAN

## 2024-09-04 SDOH — ECONOMIC STABILITY: FOOD INSECURITY: WITHIN THE PAST 12 MONTHS, THE FOOD YOU BOUGHT JUST DIDN'T LAST AND YOU DIDN'T HAVE MONEY TO GET MORE.: NEVER TRUE

## 2024-09-04 SDOH — ECONOMIC STABILITY: GENERAL
WHICH OF THE FOLLOWING WOULD YOU LIKE TO GET CONNECTED TO IN ORDER TO RECEIVE A DISCOUNT OR FOR FREE? (CHOOSE ALL THAT APPLY): PATIENT DECLINED

## 2024-09-04 SDOH — HEALTH STABILITY: PHYSICAL HEALTH: ON AVERAGE, HOW MANY DAYS PER WEEK DO YOU ENGAGE IN MODERATE TO STRENUOUS EXERCISE (LIKE A BRISK WALK)?: 1 DAY

## 2024-09-04 ASSESSMENT — SOCIAL DETERMINANTS OF HEALTH (SDOH)
HOW OFTEN DO YOU ATTENT MEETINGS OF THE CLUB OR ORGANIZATION YOU BELONG TO?: MORE THAN 4 TIMES PER YEAR
WITHIN THE LAST YEAR, HAVE YOU BEEN KICKED, HIT, SLAPPED, OR OTHERWISE PHYSICALLY HURT BY YOUR PARTNER OR EX-PARTNER?: NO
WITHIN THE LAST YEAR, HAVE YOU BEEN HUMILIATED OR EMOTIONALLY ABUSED IN OTHER WAYS BY YOUR PARTNER OR EX-PARTNER?: NO
WITHIN THE LAST YEAR, HAVE TO BEEN RAPED OR FORCED TO HAVE ANY KIND OF SEXUAL ACTIVITY BY YOUR PARTNER OR EX-PARTNER?: NO
HOW OFTEN DO YOU ATTEND CHURCH OR RELIGIOUS SERVICES?: 1 TO 4 TIMES PER YEAR
IN A TYPICAL WEEK, HOW MANY TIMES DO YOU TALK ON THE PHONE WITH FAMILY, FRIENDS, OR NEIGHBORS?: MORE THAN THREE TIMES A WEEK
IN THE PAST 12 MONTHS, HAS THE ELECTRIC, GAS, OIL, OR WATER COMPANY THREATENED TO SHUT OFF SERVICE IN YOUR HOME?: NO
ARE YOU MARRIED, WIDOWED, DIVORCED, SEPARATED, NEVER MARRIED, OR LIVING WITH A PARTNER?: NEVER MARRIED
HOW OFTEN DO YOU GET TOGETHER WITH FRIENDS OR RELATIVES?: MORE THAN THREE TIMES A WEEK
DO YOU BELONG TO ANY CLUBS OR ORGANIZATIONS SUCH AS CHURCH GROUPS UNIONS, FRATERNAL OR ATHLETIC GROUPS, OR SCHOOL GROUPS?: YES
WITHIN THE LAST YEAR, HAVE YOU BEEN AFRAID OF YOUR PARTNER OR EX-PARTNER?: NO

## 2024-09-04 ASSESSMENT — PAIN SCALES - GENERAL: PAINLEVEL: 0-NO PAIN

## 2024-09-04 NOTE — PROGRESS NOTES
PSYCHOSOCIAL ASSESSMENT     Demographic Information  Fracisco Reynoso  1995  [id number]  Preferred Name: Fracisco  Pronouns:  Assessment Type:  Initial psychosocial  Date of assessment: 09/04/24  Provider(s): Dr. Mateusz Amezquita  Diagnosis: Testicular cancer  Person(s) present during assessment: Patient, mother, this sw  Primary language: English  Interpretive services used: None                  Living Environment/Support Systems  Partner Status: Single  Children: None  Support systems: Family, friends, roommates, AA  Primary caregiver: Self  Current Living Situation: Home, Rent  Resides with: Roommates  Concerns with Housing Environment: None  Comments:     Safety  Patient safe at home? Yes  History of Domestic Violence: None      Functional Status  Functional status: Independent  Patient currently ambulates: Independently  Patient has following equipment: None  Other physical health issues that the patient is experiencing: None  What supports are in place to assist the patient: None  Transportation:  Self  Comments:         Finances/Insurance  Insurance: [place] Medicaid  Does the patient have any pending insurance applications: No   Hospital Financial Assistance: None  Patient's income source: Employment  Work History: [employer]   History: No  Background  Food Insecurity: No   Does the patient have any financial concerns: No   Any difficulties affording medications? No   Applicable [place]: No   Comments:      Advance Directives  No Advance Directives- Declined additional information   Health Care Agent Status: N/A  Health Care Agent, When applicable: N/a  Comments:    Legal Involvement  Relevant current or previous legal concerns: None     Judaism or Spiritual Identity  Comments: Spiritual    Mental Health  Active SI/HI: No  Mental Health Diagnosis, if applicable: Anxiety & Bipolar affective disorder  Mood: Good  Concerns relating to substance use (including alcohol/tobacco): Past alcohol-  active in AA  Patient identified coping skills: Talking with friends, family, AA and sponser  Learning Preferences: [place]  Cognitive Comments: None  Relevant Providers: None  Comments:       Assessment  Potential Barriers to Care:  No Insurance/ Under Insured- Potentially losing Medicaid in March  Patient Strengths:  Healthy Coping Skills, Motivated for Treatment, Self-Advocate, and Strong Support System    Plan  Referrals: N/A  Applications: N/A  Other: N/A    Narrative: sw met with pt in clinic on 9/4/2024. Upon arrival, pt was seated in exam chair, mother, Verónica was present. Sw introduced self and explained role as outpatient . Pt is a 28 year old male diagnosed with left testicular cancer August 2024. Pt currently resides in a rented home with a few roommates and has support from friends, family, roommates, AA members, and AA sponsor. Pt working two jobs and noted he may not qualify for Medicaid upon next renewal (March 2025) due to income. Sw to work with pt on different options in the new year. Pt transported himself in personal vehicle to today's appointments and notes no issues with rides at this time. Sw provided business card and encouraged pt and mother to reach out if they have any additional questions or sw needs. Sw to be available as needed.     Maggi Waddell MSW, LSW  [id number]

## 2024-09-26 ENCOUNTER — PATIENT OUTREACH (OUTPATIENT)
Dept: PRIMARY CARE | Facility: CLINIC | Age: 29
End: 2024-09-26
Payer: COMMERCIAL

## 2024-10-25 ENCOUNTER — PATIENT OUTREACH (OUTPATIENT)
Dept: PRIMARY CARE | Facility: CLINIC | Age: 29
End: 2024-10-25
Payer: COMMERCIAL

## 2024-11-04 ENCOUNTER — LAB (OUTPATIENT)
Dept: LAB | Facility: LAB | Age: 29
End: 2024-11-04
Payer: COMMERCIAL

## 2024-11-04 DIAGNOSIS — C62.92 MALIGNANT NEOPLASM OF LEFT TESTIS, UNSPECIFIED WHETHER DESCENDED OR UNDESCENDED (MULTI): ICD-10-CM

## 2024-11-04 LAB
AFP SERPL-MCNC: <4 NG/ML (ref 0–9)
ALBUMIN SERPL BCP-MCNC: 4.7 G/DL (ref 3.4–5)
ALP SERPL-CCNC: 54 U/L (ref 33–120)
ALT SERPL W P-5'-P-CCNC: 14 U/L (ref 10–52)
ANION GAP SERPL CALC-SCNC: 10 MMOL/L (ref 10–20)
AST SERPL W P-5'-P-CCNC: 14 U/L (ref 9–39)
B-HCG SERPL-ACNC: <2 MIU/ML
BASOPHILS # BLD AUTO: 0.06 X10*3/UL (ref 0–0.1)
BASOPHILS NFR BLD AUTO: 0.7 %
BILIRUB SERPL-MCNC: 0.8 MG/DL (ref 0–1.2)
BUN SERPL-MCNC: 15 MG/DL (ref 6–23)
CALCIUM SERPL-MCNC: 9.5 MG/DL (ref 8.6–10.3)
CHLORIDE SERPL-SCNC: 102 MMOL/L (ref 98–107)
CO2 SERPL-SCNC: 30 MMOL/L (ref 21–32)
CREAT SERPL-MCNC: 0.9 MG/DL (ref 0.5–1.3)
EGFRCR SERPLBLD CKD-EPI 2021: >90 ML/MIN/1.73M*2
EOSINOPHIL # BLD AUTO: 0.3 X10*3/UL (ref 0–0.7)
EOSINOPHIL NFR BLD AUTO: 3.5 %
ERYTHROCYTE [DISTWIDTH] IN BLOOD BY AUTOMATED COUNT: 14.4 % (ref 11.5–14.5)
GLUCOSE SERPL-MCNC: 91 MG/DL (ref 74–99)
HCT VFR BLD AUTO: 45.9 % (ref 41–52)
HGB BLD-MCNC: 14.5 G/DL (ref 13.5–17.5)
IMM GRANULOCYTES # BLD AUTO: 0.02 X10*3/UL (ref 0–0.7)
IMM GRANULOCYTES NFR BLD AUTO: 0.2 % (ref 0–0.9)
LDH SERPL L TO P-CCNC: 135 U/L (ref 84–246)
LYMPHOCYTES # BLD AUTO: 3.13 X10*3/UL (ref 1.2–4.8)
LYMPHOCYTES NFR BLD AUTO: 36.5 %
MCH RBC QN AUTO: 25.6 PG (ref 26–34)
MCHC RBC AUTO-ENTMCNC: 31.6 G/DL (ref 32–36)
MCV RBC AUTO: 81 FL (ref 80–100)
MONOCYTES # BLD AUTO: 0.74 X10*3/UL (ref 0.1–1)
MONOCYTES NFR BLD AUTO: 8.6 %
NEUTROPHILS # BLD AUTO: 4.33 X10*3/UL (ref 1.2–7.7)
NEUTROPHILS NFR BLD AUTO: 50.5 %
NRBC BLD-RTO: 0 /100 WBCS (ref 0–0)
PLATELET # BLD AUTO: 327 X10*3/UL (ref 150–450)
POTASSIUM SERPL-SCNC: 4.1 MMOL/L (ref 3.5–5.3)
PROT SERPL-MCNC: 7.5 G/DL (ref 6.4–8.2)
RBC # BLD AUTO: 5.66 X10*6/UL (ref 4.5–5.9)
SODIUM SERPL-SCNC: 138 MMOL/L (ref 136–145)
WBC # BLD AUTO: 8.6 X10*3/UL (ref 4.4–11.3)

## 2024-11-04 PROCEDURE — 85025 COMPLETE CBC W/AUTO DIFF WBC: CPT

## 2024-11-04 PROCEDURE — 36415 COLL VENOUS BLD VENIPUNCTURE: CPT

## 2024-11-04 PROCEDURE — 82105 ALPHA-FETOPROTEIN SERUM: CPT

## 2024-11-04 PROCEDURE — 80053 COMPREHEN METABOLIC PANEL: CPT

## 2024-11-04 PROCEDURE — 84702 CHORIONIC GONADOTROPIN TEST: CPT

## 2024-11-04 PROCEDURE — 83615 LACTATE (LD) (LDH) ENZYME: CPT

## 2024-11-07 ENCOUNTER — OFFICE VISIT (OUTPATIENT)
Dept: HEMATOLOGY/ONCOLOGY | Facility: CLINIC | Age: 29
End: 2024-11-07
Payer: COMMERCIAL

## 2024-11-07 VITALS
TEMPERATURE: 97.7 F | RESPIRATION RATE: 16 BRPM | DIASTOLIC BLOOD PRESSURE: 68 MMHG | OXYGEN SATURATION: 99 % | BODY MASS INDEX: 18.07 KG/M2 | HEART RATE: 82 BPM | WEIGHT: 137 LBS | SYSTOLIC BLOOD PRESSURE: 120 MMHG

## 2024-11-07 DIAGNOSIS — C62.92 MALIGNANT NEOPLASM OF LEFT TESTIS, UNSPECIFIED WHETHER DESCENDED OR UNDESCENDED (MULTI): Primary | ICD-10-CM

## 2024-11-07 PROCEDURE — 99214 OFFICE O/P EST MOD 30 MIN: CPT | Performed by: STUDENT IN AN ORGANIZED HEALTH CARE EDUCATION/TRAINING PROGRAM

## 2024-11-07 PROCEDURE — 1036F TOBACCO NON-USER: CPT | Performed by: STUDENT IN AN ORGANIZED HEALTH CARE EDUCATION/TRAINING PROGRAM

## 2024-11-07 ASSESSMENT — PAIN SCALES - GENERAL: PAINLEVEL_OUTOF10: 0-NO PAIN

## 2024-11-07 NOTE — PROGRESS NOTES
Fracisco is here alone for his follow up appt with Dr Amezquita. He reports he is doing well with no new complaints. Meds and allergies reviewed and updated. MD made aware. Education Documentation  Healthy Lifestyle, taught by Lindsey Saunders RN at 11/7/2024  8:25 AM.  Learner: Patient  Readiness: Acceptance  Method: Explanation  Response: Verbalizes Understanding    Monitoring Weight, taught by Lindsey Saunders RN at 11/7/2024  8:25 AM.  Learner: Patient  Readiness: Acceptance  Method: Explanation  Response: Verbalizes Understanding    Tips for Daily Living, taught by Lindsey Saunders RN at 11/7/2024  8:25 AM.  Learner: Patient  Readiness: Acceptance  Method: Explanation  Response: Verbalizes Understanding    Nutrition/Diet, taught by Lindsey Saunders RN at 11/7/2024  8:25 AM.  Learner: Patient  Readiness: Acceptance  Method: Explanation  Response: Verbalizes Understanding    Nutrition, taught by Lindsey Saunders RN at 11/7/2024  8:25 AM.  Learner: Patient  Readiness: Acceptance  Method: Explanation  Response: Verbalizes Understanding    Treatment Plan and Schedule, taught by Lindsey Saunders RN at 11/7/2024  8:25 AM.  Learner: Patient  Readiness: Acceptance  Method: Explanation  Response: Verbalizes Understanding    Education Comments  No comments found.

## 2024-11-08 NOTE — PROGRESS NOTES
Oncology Return Visit      Patient ID: Fracisco Reynoso is a 28 y.o. male who presents for follow up of testicular cancer  Primary Care Provider: Maren Salazar DO    Patient Timeline    Date  Event    7/9/24  US scrotum shows 2 suspicious masses in L testicle                          ,      7/18/24 CT C/A/P showed no metastases     7/29/24 s/p left radical orchiectomy, stage I (pT1bN0) without LVI, margins negative      8/30/24 Tumor markers negative (HCG <3, AFP <4)     Cancer Staging   Left testicular cancer (Multi)  Staging form: Testis, AJCC 8th Edition  - Clinical: cTis, cN0, cM0, S0 - Signed by Mateusz Amezquita MD PhD on 11/7/2024      [facility]    Fracisco Reynoso presents unaccompanied. He feels well with no recent health issues. He has not noticed any new masses or nodules. He is doing self-examinations of the contralateral testicle. He reports no fevers, chills, night sweats, dyspnea, chest pain, abdominal pain, nausea, vomiting, diarrhea, constipation, extremity weakness, neuropathy, skin changes/rash, easy bleeding/bruising, vision changes, or headaches.    ROS    A 14 point review of systems was performed and is negative unless otherwise stated in the HPI    PMH    Past Medical History:   Diagnosis Date    Anxiety     Arthralgia, unspecified joint     Bipolar disorder     Depression     GERD (gastroesophageal reflux disease)     PONV (postoperative nausea and vomiting)     Seasonal allergies     Undescended testicle         Medications    Current Outpatient Medications   Medication Instructions    cetirizine (ZYRTEC) 10 mg, Daily PRN    cholecalciferol (VITAMIN D-3) 1,000 Units, Daily    famotidine (PEPCID) 20 mg, Daily PRN        Fam Hx    Family History   Problem Relation Name Age of Onset    Anxiety disorder Mother      Obesity Mother      Depression Mother      No Known Problems Father      No Known Problems Brother         Social Hx    He lives at home with his room mates. He graduated college  in 2018 and works as a scribe at a primary care practice. He quit alcohol in  and has been sober since.   Fracisco Reynoso  reports that he quit smoking about 21 months ago. His smoking use included cigarettes. He has never used smokeless tobacco.  He  reports no history of alcohol use.  He  reports no history of drug use.    Objective     Physical Examination    Vitals  /68 (BP Location: Left arm, Patient Position: Sitting)   Pulse 82   Temp 36.5 °C (97.7 °F) (Temporal)   Resp 16   Wt 62.1 kg (137 lb)   SpO2 99%   BMI 18.07 kg/m²   BSA: 1.79 meters squared    Performance Status:  Asymptomatic (ECO)    Physical Exam    GENERAL: Well appearing, in no apparent distress  HEENT: No cervical or supraclavicular adenopathy. Mucous membranes moist.  CV: Regular rate and rhythm, Normal S1, S2, no murmurs/rubs/gallops  RESP: Normal work of breathing, CTAB without wheeze or crackles  ABD: Normoactive bowel sounds. Soft, nontender, nondistended.  EXT: Warm and well perfused, no edema  NEURO: A&O x 3, normal gait  SKIN: No visible rashes or bruising.  PSYCH: Normal affect.    Results    Labs  Lab Results   Component Value Date    WBC 8.6 2024    HGB 14.5 2024    HCT 45.9 2024    MCV 81 2024     2024     Lab Results   Component Value Date    GLUCOSE 91 2024    CALCIUM 9.5 2024     2024    K 4.1 2024    CO2 30 2024     2024    BUN 15 2024    CREATININE 0.90 2024     Lab Results   Component Value Date    ALT 14 2024    AST 14 2024    ALKPHOS 54 2024    BILITOT 0.8 2024      Lab Results   Component Value Date    TSH 1.43 2023      Lab Results   Component Value Date    AFP <4 2024     Beta-hcg < 2      Imaging    Imaging personally reviewed in EHR and summarized in prior notes. No new imaging.    Genomics    Germline:  None    Somatic:  None    Assessment/Plan    Fracisco Reynoso is a 28  y.o. year old male diagnosed with stage I nonseminoma without risk factors. 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 He is monitored on surveillance.    Tumor markers are negative, and he has no clinical signs of recurrence. Imaging will be ordered prior to his next visit.    # Testicular nonseminoma, stage I   - Follows with urology (Dr. Catherine); s/p L orchiectomy 7/2024  - Surveillance              Year 1: AFP, HCG, LDH every 2 months  CT A/P with IV contrast and chest imaging (CXR or CT chest) every 4 months; ordered today    The above plan was discussed with the patient, and he was in agreement. All questions were answered to his satisfaction.    RV 2 months with labs (CBC, CMP, AFP, beta-hcg, LDH)    More than 30 minutes were spent in face-to-face encounter, review of medical records, coordination of care, and documentation.

## 2024-11-12 ENCOUNTER — APPOINTMENT (OUTPATIENT)
Dept: PRIMARY CARE | Facility: CLINIC | Age: 29
End: 2024-11-12
Payer: COMMERCIAL

## 2025-01-03 ENCOUNTER — APPOINTMENT (OUTPATIENT)
Dept: PRIMARY CARE | Facility: CLINIC | Age: 30
End: 2025-01-03
Payer: COMMERCIAL

## 2025-01-07 ENCOUNTER — APPOINTMENT (OUTPATIENT)
Dept: PRIMARY CARE | Facility: CLINIC | Age: 30
End: 2025-01-07
Payer: COMMERCIAL

## 2025-01-07 ENCOUNTER — LAB (OUTPATIENT)
Dept: LAB | Facility: LAB | Age: 30
End: 2025-01-07
Payer: COMMERCIAL

## 2025-01-07 ENCOUNTER — APPOINTMENT (OUTPATIENT)
Dept: RADIOLOGY | Facility: CLINIC | Age: 30
End: 2025-01-07
Payer: COMMERCIAL

## 2025-01-07 DIAGNOSIS — C62.92 MALIGNANT NEOPLASM OF LEFT TESTIS, UNSPECIFIED WHETHER DESCENDED OR UNDESCENDED (MULTI): ICD-10-CM

## 2025-01-07 LAB
ALBUMIN SERPL BCP-MCNC: 5 G/DL (ref 3.4–5)
ALP SERPL-CCNC: 54 U/L (ref 33–120)
ALT SERPL W P-5'-P-CCNC: 10 U/L (ref 10–52)
ANION GAP SERPL CALC-SCNC: 14 MMOL/L (ref 10–20)
AST SERPL W P-5'-P-CCNC: 16 U/L (ref 9–39)
B-HCG SERPL-ACNC: <2 MIU/ML
BASOPHILS # BLD AUTO: 0.07 X10*3/UL (ref 0–0.1)
BASOPHILS NFR BLD AUTO: 0.8 %
BILIRUB SERPL-MCNC: 1.1 MG/DL (ref 0–1.2)
BUN SERPL-MCNC: 13 MG/DL (ref 6–23)
CALCIUM SERPL-MCNC: 10 MG/DL (ref 8.6–10.3)
CHLORIDE SERPL-SCNC: 102 MMOL/L (ref 98–107)
CO2 SERPL-SCNC: 26 MMOL/L (ref 21–32)
CREAT SERPL-MCNC: 1.03 MG/DL (ref 0.5–1.3)
EGFRCR SERPLBLD CKD-EPI 2021: >90 ML/MIN/1.73M*2
EOSINOPHIL # BLD AUTO: 0.16 X10*3/UL (ref 0–0.7)
EOSINOPHIL NFR BLD AUTO: 1.8 %
ERYTHROCYTE [DISTWIDTH] IN BLOOD BY AUTOMATED COUNT: 13.5 % (ref 11.5–14.5)
GLUCOSE SERPL-MCNC: 87 MG/DL (ref 74–99)
HCT VFR BLD AUTO: 45.2 % (ref 41–52)
HGB BLD-MCNC: 14.2 G/DL (ref 13.5–17.5)
IMM GRANULOCYTES # BLD AUTO: 0.03 X10*3/UL (ref 0–0.7)
IMM GRANULOCYTES NFR BLD AUTO: 0.3 % (ref 0–0.9)
LDH SERPL L TO P-CCNC: 180 U/L (ref 84–246)
LYMPHOCYTES # BLD AUTO: 3.21 X10*3/UL (ref 1.2–4.8)
LYMPHOCYTES NFR BLD AUTO: 35.7 %
MCH RBC QN AUTO: 25.5 PG (ref 26–34)
MCHC RBC AUTO-ENTMCNC: 31.4 G/DL (ref 32–36)
MCV RBC AUTO: 81 FL (ref 80–100)
MONOCYTES # BLD AUTO: 0.94 X10*3/UL (ref 0.1–1)
MONOCYTES NFR BLD AUTO: 10.5 %
NEUTROPHILS # BLD AUTO: 4.58 X10*3/UL (ref 1.2–7.7)
NEUTROPHILS NFR BLD AUTO: 50.9 %
NRBC BLD-RTO: 0 /100 WBCS (ref 0–0)
PLATELET # BLD AUTO: 363 X10*3/UL (ref 150–450)
POTASSIUM SERPL-SCNC: 4.5 MMOL/L (ref 3.5–5.3)
PROT SERPL-MCNC: 7.7 G/DL (ref 6.4–8.2)
RBC # BLD AUTO: 5.56 X10*6/UL (ref 4.5–5.9)
SODIUM SERPL-SCNC: 137 MMOL/L (ref 136–145)
WBC # BLD AUTO: 9 X10*3/UL (ref 4.4–11.3)

## 2025-01-07 PROCEDURE — 84702 CHORIONIC GONADOTROPIN TEST: CPT

## 2025-01-07 PROCEDURE — 80053 COMPREHEN METABOLIC PANEL: CPT

## 2025-01-07 PROCEDURE — 83615 LACTATE (LD) (LDH) ENZYME: CPT

## 2025-01-07 PROCEDURE — 85025 COMPLETE CBC W/AUTO DIFF WBC: CPT

## 2025-01-07 PROCEDURE — 82105 ALPHA-FETOPROTEIN SERUM: CPT

## 2025-01-08 LAB — AFP SERPL-MCNC: <4 NG/ML (ref 0–9)

## 2025-01-09 ENCOUNTER — SOCIAL WORK (OUTPATIENT)
Dept: CASE MANAGEMENT | Facility: HOSPITAL | Age: 30
End: 2025-01-09

## 2025-01-09 ENCOUNTER — OFFICE VISIT (OUTPATIENT)
Dept: HEMATOLOGY/ONCOLOGY | Facility: CLINIC | Age: 30
End: 2025-01-09
Payer: COMMERCIAL

## 2025-01-09 VITALS
RESPIRATION RATE: 18 BRPM | HEART RATE: 99 BPM | TEMPERATURE: 98.4 F | SYSTOLIC BLOOD PRESSURE: 134 MMHG | HEIGHT: 72 IN | WEIGHT: 133.2 LBS | DIASTOLIC BLOOD PRESSURE: 78 MMHG | OXYGEN SATURATION: 99 % | BODY MASS INDEX: 18.04 KG/M2

## 2025-01-09 DIAGNOSIS — C62.92 MALIGNANT NEOPLASM OF LEFT TESTIS, UNSPECIFIED WHETHER DESCENDED OR UNDESCENDED (MULTI): ICD-10-CM

## 2025-01-09 PROCEDURE — 1036F TOBACCO NON-USER: CPT | Performed by: NURSE PRACTITIONER

## 2025-01-09 PROCEDURE — 99215 OFFICE O/P EST HI 40 MIN: CPT | Performed by: NURSE PRACTITIONER

## 2025-01-09 PROCEDURE — 99417 PROLNG OP E/M EACH 15 MIN: CPT | Performed by: NURSE PRACTITIONER

## 2025-01-09 PROCEDURE — 3008F BODY MASS INDEX DOCD: CPT | Performed by: NURSE PRACTITIONER

## 2025-01-09 ASSESSMENT — COLUMBIA-SUICIDE SEVERITY RATING SCALE - C-SSRS
1. IN THE PAST MONTH, HAVE YOU WISHED YOU WERE DEAD OR WISHED YOU COULD GO TO SLEEP AND NOT WAKE UP?: NO
2. HAVE YOU ACTUALLY HAD ANY THOUGHTS OF KILLING YOURSELF?: NO
6. HAVE YOU EVER DONE ANYTHING, STARTED TO DO ANYTHING, OR PREPARED TO DO ANYTHING TO END YOUR LIFE?: NO

## 2025-01-09 ASSESSMENT — ENCOUNTER SYMPTOMS
LOSS OF SENSATION IN FEET: 0
DEPRESSION: 0
OCCASIONAL FEELINGS OF UNSTEADINESS: 0

## 2025-01-09 ASSESSMENT — PAIN SCALES - GENERAL: PAINLEVEL_OUTOF10: 0-NO PAIN

## 2025-01-09 NOTE — PROGRESS NOTES
Social Work Note  1/9/2025  Provider Michael asked this writer to talk with patient regarding insurance questions.  SW was unable to meet with patient while he was here.  VM left this afternoon with direct contact information to call with questions.  SW will remain available to assist patient.  KENDALL Adams, [facility] [phone]

## 2025-01-09 NOTE — PROGRESS NOTES
Patient ID: Fracisco Reynoso is a 29 y.o. male.  Attending Physician: Dr. Mateusz Amezquita  Cancer Diagnosis: Cancer Staging   Left testicular cancer (Multi)  Staging form: Testis, AJCC 8th Edition  - Clinical: cTis, cN0, cM0, S0 - Signed by Mateusz Amezquita MD PhD on 11/7/2024     Current Therapy: Surveillance  Genetics: N/A    Subjective      Cancer History:  Oncology History   Left testicular cancer (Multi)   8/6/2024 Initial Diagnosis    Left testicular cancer (Multi)     11/7/2024 Cancer Staged    Staging form: Testis, AJCC 8th Edition, Clinical: cTis, cN0, cM0, S0 - Signed by Mateusz Amezquita MD PhD on 11/7/2024 7/9/24              US scrotum shows 2 suspicious masses in L testicle                          ,      7/18/24 CT C/A/P showed no metastases     7/29/24            s/p left radical orchiectomy, stage I (pT1bN0) without LVI, margins negative      8/30/24 Tumor markers negative (HCG <3, AFP <4)    1/7/25  Tumor markers negative    Interval History:  Fracisco presents in follow up today on surveillance. He started a new job in October, and the insurance with his job has not allowed him to sign up. He is working with HR on this, but this is why he has not gotten his scan. He is overall feeling well. States that just prior to dx, he developed some breast tenderness which resolved. After surgery, he has what he was worried is a hernia over his left abdomen, though this has improved. He has not felt any new or concerning LAD or masses on contralateral testicle. He is overall feeling well, though is anxious. He reports anxiety around appts, but not at between times. Weight is generally low and fluctuant, not noticed any changes lately. Appetite has never been very great. The remainder of his ROS is otherwise negative.    HPI    Objective    BSA: 1.76 meters squared  Resp 18   Wt 60.4 kg (133 lb 3.2 oz)   BMI 17.57 kg/m²     Physical Exam  General: alert, well-dressed in NAD. Speech is fluent and coherent,  words clear. Good insight.  Skin: warm, dry, and pink without cyanosis or nail clubbing. No rash, petechiae, or ecchymoses  HEENT: Normocephalic atraumatic. Sclera white, conjunctiva pink. EOMs intact. Hearing intact to spoken voice. Oral mucosa pink. No visible lesions  Respiratory: Chest expansion symmetric. Breath sounds vesicular in all lobes without crackles, wheezes, or rhonchi bilaterally.  CV: S1S2 audible and crisp without murmurs, rubs, or extra sounds. RRR. Radial pulses strong and regular. Extremities warm and pink. No edema bilaterally.  Lymph: no palpable cervical, submandibular, or supraclavicular lymphadenopathy.  GI: abdomen is round, soft, and non-tender. Active bowel sounds. No palpable masses or hepatosplenomegaly. Slight hypertrophy of tissue likely 2/2 scarring over incision site.  Psych: engaged, polite, appropriate conversation and eye contact.    Current Medications:    Current Outpatient Medications:     cetirizine (ZyrTEC) 10 mg tablet, Take 1 tablet (10 mg) by mouth once daily as needed for allergies. (Patient taking differently: Take 1 tablet (10 mg) by mouth once daily.), Disp: , Rfl:     cholecalciferol (Vitamin D-3) 25 MCG (1000 UT) tablet, Take 1 tablet (1,000 Units) by mouth once daily., Disp: , Rfl:     famotidine (Pepcid) 20 mg tablet, Take 1 tablet (20 mg) by mouth once daily as needed for heartburn., Disp: , Rfl:      Most Recent Labs:  Results for orders placed or performed in visit on 01/07/25   CBC and Auto Differential    Collection Time: 01/07/25  5:50 PM   Result Value Ref Range    WBC 9.0 4.4 - 11.3 x10*3/uL    nRBC 0.0 0.0 - 0.0 /100 WBCs    RBC 5.56 4.50 - 5.90 x10*6/uL    Hemoglobin 14.2 13.5 - 17.5 g/dL    Hematocrit 45.2 41.0 - 52.0 %    MCV 81 80 - 100 fL    MCH 25.5 (L) 26.0 - 34.0 pg    MCHC 31.4 (L) 32.0 - 36.0 g/dL    RDW 13.5 11.5 - 14.5 %    Platelets 363 150 - 450 x10*3/uL    Neutrophils % 50.9 40.0 - 80.0 %    Immature Granulocytes %, Automated 0.3 0.0 - 0.9  "%    Lymphocytes % 35.7 13.0 - 44.0 %    Monocytes % 10.5 2.0 - 10.0 %    Eosinophils % 1.8 0.0 - 6.0 %    Basophils % 0.8 0.0 - 2.0 %    Neutrophils Absolute 4.58 1.20 - 7.70 x10*3/uL    Immature Granulocytes Absolute, Automated 0.03 0.00 - 0.70 x10*3/uL    Lymphocytes Absolute 3.21 1.20 - 4.80 x10*3/uL    Monocytes Absolute 0.94 0.10 - 1.00 x10*3/uL    Eosinophils Absolute 0.16 0.00 - 0.70 x10*3/uL    Basophils Absolute 0.07 0.00 - 0.10 x10*3/uL   Comprehensive Metabolic Panel    Collection Time: 01/07/25  5:50 PM   Result Value Ref Range    Glucose 87 74 - 99 mg/dL    Sodium 137 136 - 145 mmol/L    Potassium 4.5 3.5 - 5.3 mmol/L    Chloride 102 98 - 107 mmol/L    Bicarbonate 26 21 - 32 mmol/L    Anion Gap 14 10 - 20 mmol/L    Urea Nitrogen 13 6 - 23 mg/dL    Creatinine 1.03 0.50 - 1.30 mg/dL    eGFR >90 >60 mL/min/1.73m*2    Calcium 10.0 8.6 - 10.3 mg/dL    Albumin 5.0 3.4 - 5.0 g/dL    Alkaline Phosphatase 54 33 - 120 U/L    Total Protein 7.7 6.4 - 8.2 g/dL    AST 16 9 - 39 U/L    Bilirubin, Total 1.1 0.0 - 1.2 mg/dL    ALT 10 10 - 52 U/L   Human Chorionic Gonadotropin, Serum Quantitative    Collection Time: 01/07/25  5:50 PM   Result Value Ref Range    HCG, Beta-Quantitative <2 <5 mIU/mL   AFP tumor marker    Collection Time: 01/07/25  5:50 PM   Result Value Ref Range    Alpha-Fetoprotein <4 0 - 9 ng/mL   Lactate dehydrogenase    Collection Time: 01/07/25  5:50 PM   Result Value Ref Range     84 - 246 U/L      No results found for: \"PSA\"     Performance Status:  ECOG Score: 0- Fully active, able to carry on all pre-disease performance w/o restriction.  Karnofsky Score: 100 - Fully active, able to carry on all pre-disease performed without restriction      Assessment/Plan   Fracisco Reynoso is a 29 y.o. male with a history of stage I NSGCT who presents in follow up on surveillance.     He was diagnosed with stage I nonseminoma without risk factors. 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 He is monitored on surveillance.     Tumor markers are negative, and he has no clinical signs of recurrence. Imaging was ordered but not completed, so we will need to reschedule when insurance issue is figured out. SW will contact him today about this.      # Testicular nonseminoma, stage I   - Follows with urology (Dr. Catherine); s/p L orchiectomy 7/2024  - Surveillance              Year 1: AFP, HCG, LDH every 2 months  CT A/P with IV contrast and chest imaging (CXR or CT chest) every 4 months; overdue today and will reschedule    # Health Maintenance  - Continue with PCP and other healthcare providers    RTC 2 months with labs    Total time spent on this encounter was 60 minutes, which included preparation, direct time with patient, documentation, and care coordination on the day of visit.    Thania Rodriguez, MSN, APRN, AGNP-C, AOCNP  Associate Nurse Practitioner  [facility]

## 2025-02-21 ENCOUNTER — APPOINTMENT (OUTPATIENT)
Facility: CLINIC | Age: 30
End: 2025-02-21
Payer: COMMERCIAL

## 2025-03-04 ENCOUNTER — LAB (OUTPATIENT)
Dept: LAB | Facility: HOSPITAL | Age: 30
End: 2025-03-04
Payer: COMMERCIAL

## 2025-03-04 DIAGNOSIS — C62.92 MALIGNANT NEOPLASM OF LEFT TESTIS, UNSPECIFIED WHETHER DESCENDED OR UNDESCENDED (MULTI): Primary | ICD-10-CM

## 2025-03-04 LAB
ALBUMIN SERPL BCP-MCNC: 4.6 G/DL (ref 3.4–5)
ALP SERPL-CCNC: 51 U/L (ref 33–120)
ALT SERPL W P-5'-P-CCNC: 10 U/L (ref 10–52)
ANION GAP SERPL CALC-SCNC: 11 MMOL/L (ref 10–20)
AST SERPL W P-5'-P-CCNC: 12 U/L (ref 9–39)
BASOPHILS # BLD AUTO: 0.03 X10*3/UL (ref 0–0.1)
BASOPHILS NFR BLD AUTO: 0.4 %
BILIRUB SERPL-MCNC: 0.8 MG/DL (ref 0–1.2)
BUN SERPL-MCNC: 13 MG/DL (ref 6–23)
CALCIUM SERPL-MCNC: 9.5 MG/DL (ref 8.6–10.3)
CHLORIDE SERPL-SCNC: 104 MMOL/L (ref 98–107)
CO2 SERPL-SCNC: 29 MMOL/L (ref 21–32)
CREAT SERPL-MCNC: 0.94 MG/DL (ref 0.5–1.3)
EGFRCR SERPLBLD CKD-EPI 2021: >90 ML/MIN/1.73M*2
EOSINOPHIL # BLD AUTO: 0.22 X10*3/UL (ref 0–0.7)
EOSINOPHIL NFR BLD AUTO: 2.6 %
ERYTHROCYTE [DISTWIDTH] IN BLOOD BY AUTOMATED COUNT: 13.5 % (ref 11.5–14.5)
GLUCOSE SERPL-MCNC: 140 MG/DL (ref 74–99)
HCT VFR BLD AUTO: 41.5 % (ref 41–52)
HGB BLD-MCNC: 13.4 G/DL (ref 13.5–17.5)
IMM GRANULOCYTES # BLD AUTO: 0.03 X10*3/UL (ref 0–0.7)
IMM GRANULOCYTES NFR BLD AUTO: 0.4 % (ref 0–0.9)
LDH SERPL L TO P-CCNC: 122 U/L (ref 84–246)
LYMPHOCYTES # BLD AUTO: 2.59 X10*3/UL (ref 1.2–4.8)
LYMPHOCYTES NFR BLD AUTO: 30.7 %
MCH RBC QN AUTO: 25.9 PG (ref 26–34)
MCHC RBC AUTO-ENTMCNC: 32.3 G/DL (ref 32–36)
MCV RBC AUTO: 80 FL (ref 80–100)
MONOCYTES # BLD AUTO: 0.7 X10*3/UL (ref 0.1–1)
MONOCYTES NFR BLD AUTO: 8.3 %
NEUTROPHILS # BLD AUTO: 4.87 X10*3/UL (ref 1.2–7.7)
NEUTROPHILS NFR BLD AUTO: 57.6 %
NRBC BLD-RTO: 0 /100 WBCS (ref 0–0)
PLATELET # BLD AUTO: 318 X10*3/UL (ref 150–450)
POTASSIUM SERPL-SCNC: 4.1 MMOL/L (ref 3.5–5.3)
PROT SERPL-MCNC: 6.8 G/DL (ref 6.4–8.2)
RBC # BLD AUTO: 5.17 X10*6/UL (ref 4.5–5.9)
SODIUM SERPL-SCNC: 140 MMOL/L (ref 136–145)
WBC # BLD AUTO: 8.4 X10*3/UL (ref 4.4–11.3)

## 2025-03-04 PROCEDURE — 84702 CHORIONIC GONADOTROPIN TEST: CPT

## 2025-03-04 PROCEDURE — 36415 COLL VENOUS BLD VENIPUNCTURE: CPT

## 2025-03-04 PROCEDURE — 82105 ALPHA-FETOPROTEIN SERUM: CPT

## 2025-03-04 PROCEDURE — 85025 COMPLETE CBC W/AUTO DIFF WBC: CPT

## 2025-03-04 PROCEDURE — 80053 COMPREHEN METABOLIC PANEL: CPT

## 2025-03-04 PROCEDURE — 83615 LACTATE (LD) (LDH) ENZYME: CPT

## 2025-03-05 LAB
AFP SERPL-MCNC: <4 NG/ML (ref 0–9)
B-HCG SERPL-ACNC: <3 MIU/ML

## 2025-03-06 ENCOUNTER — APPOINTMENT (OUTPATIENT)
Dept: HEMATOLOGY/ONCOLOGY | Facility: CLINIC | Age: 30
End: 2025-03-06
Payer: COMMERCIAL

## 2025-03-11 ENCOUNTER — HOSPITAL ENCOUNTER (OUTPATIENT)
Dept: RADIOLOGY | Facility: HOSPITAL | Age: 30
Discharge: HOME | End: 2025-03-11
Payer: COMMERCIAL

## 2025-03-11 DIAGNOSIS — C62.92 MALIGNANT NEOPLASM OF LEFT TESTIS, UNSPECIFIED WHETHER DESCENDED OR UNDESCENDED (MULTI): ICD-10-CM

## 2025-03-11 PROCEDURE — 2550000001 HC RX 255 CONTRASTS: Performed by: STUDENT IN AN ORGANIZED HEALTH CARE EDUCATION/TRAINING PROGRAM

## 2025-03-11 PROCEDURE — 74177 CT ABD & PELVIS W/CONTRAST: CPT

## 2025-03-11 RX ADMIN — IOHEXOL 75 ML: 350 INJECTION, SOLUTION INTRAVENOUS at 18:23

## 2025-03-13 ENCOUNTER — OFFICE VISIT (OUTPATIENT)
Dept: HEMATOLOGY/ONCOLOGY | Facility: CLINIC | Age: 30
End: 2025-03-13
Payer: COMMERCIAL

## 2025-03-13 VITALS
RESPIRATION RATE: 18 BRPM | OXYGEN SATURATION: 98 % | HEART RATE: 87 BPM | DIASTOLIC BLOOD PRESSURE: 82 MMHG | TEMPERATURE: 97.5 F | WEIGHT: 137.1 LBS | SYSTOLIC BLOOD PRESSURE: 147 MMHG | BODY MASS INDEX: 18.67 KG/M2

## 2025-03-13 DIAGNOSIS — C62.92 MALIGNANT NEOPLASM OF LEFT TESTIS, UNSPECIFIED WHETHER DESCENDED OR UNDESCENDED (MULTI): Primary | ICD-10-CM

## 2025-03-13 PROCEDURE — 99215 OFFICE O/P EST HI 40 MIN: CPT | Performed by: NURSE PRACTITIONER

## 2025-03-13 PROCEDURE — 1036F TOBACCO NON-USER: CPT | Performed by: NURSE PRACTITIONER

## 2025-03-13 ASSESSMENT — PAIN SCALES - GENERAL: PAINLEVEL_OUTOF10: 0-NO PAIN

## 2025-03-13 ASSESSMENT — COLUMBIA-SUICIDE SEVERITY RATING SCALE - C-SSRS
2. HAVE YOU ACTUALLY HAD ANY THOUGHTS OF KILLING YOURSELF?: NO
1. IN THE PAST MONTH, HAVE YOU WISHED YOU WERE DEAD OR WISHED YOU COULD GO TO SLEEP AND NOT WAKE UP?: NO
6. HAVE YOU EVER DONE ANYTHING, STARTED TO DO ANYTHING, OR PREPARED TO DO ANYTHING TO END YOUR LIFE?: NO

## 2025-03-13 ASSESSMENT — PATIENT HEALTH QUESTIONNAIRE - PHQ9
1. LITTLE INTEREST OR PLEASURE IN DOING THINGS: NOT AT ALL
2. FEELING DOWN, DEPRESSED OR HOPELESS: NOT AT ALL
SUM OF ALL RESPONSES TO PHQ9 QUESTIONS 1 AND 2: 0

## 2025-03-13 NOTE — PROGRESS NOTES
"Patient ID: Fracisco Reynoso is a 29 y.o. male.  Attending Physician: Dr. Mateusz Amezquita  Cancer Diagnosis:  Cancer Staging   Left testicular cancer (Multi)  Staging form: Testis, AJCC 8th Edition  - Clinical: cTis, cN0, cM0, S0 - Signed by Mateusz Amezquita MD PhD on 11/7/2024     Current Therapy: Surveillance  Genetics: N/A    Subjective      Cancer History:  Oncology History   Left testicular cancer (Multi)   8/6/2024 Initial Diagnosis    Left testicular cancer (Multi)     11/7/2024 Cancer Staged    Staging form: Testis, AJCC 8th Edition, Clinical: cTis, cN0, cM0, S0 - Signed by Mateusz Amezquita MD PhD on 11/7/2024 7/9/24              US scrotum shows 2 suspicious masses in L testicle                          ,      7/18/24 CT C/A/P showed no metastases     7/29/24            s/p left radical orchiectomy, stage I (pT1bN0) without LVI, margins negative      8/30/24 Tumor markers negative (HCG <3, AFP <4)    1/7/25  Tumor markers negative    Interval History:  Fracisco presents in follow up today on surveillance. He feels well overall. He says he's noticed a \"lump\" in his right breast for the past month or so. Not sure if it has gotten larger or stayed same. Hurts to the touch. His lower back hurts. Was diagnosed with scoliosis and says he has years of poor posture. He otherwise has been feeling well without new or concerning symptoms. Doing regular self-exams without abnormalities. The remainder of his ROS is otherwise negative.  HPI    Objective    BSA: 1.78 meters squared  /82 (BP Location: Left arm, Patient Position: Sitting, BP Cuff Size: Adult)   Pulse 87   Temp 36.4 °C (97.5 °F) (Temporal)   Resp 18   Wt 62.2 kg (137 lb 1.6 oz)   SpO2 98%   BMI 18.67 kg/m²     Physical Exam  General: alert, well-dressed in NAD. Speech is fluent and coherent, words clear. Good insight.  Skin: warm, dry, and pink without cyanosis or nail clubbing. No rash, petechiae, or ecchymoses  Breast: Very small nodule " noted in left breast, tender to palpation (light), fixed.  HEENT: Normocephalic atraumatic. Sclera white, conjunctiva pink. EOMs intact. Hearing intact to spoken voice. Oral mucosa pink. No visible lesions  Respiratory: Chest expansion symmetric. Breath sounds vesicular in all lobes without crackles, wheezes, or rhonchi bilaterally.  CV: S1S2 audible and crisp without murmurs, rubs, or extra sounds. RRR. Radial pulses strong and regular. Extremities warm and pink. No edema bilaterally.  Lymph: no palpable cervical, submandibular, or supraclavicular lymphadenopathy.  GI: abdomen is round, soft, and non-tender. Active bowel sounds. No palpable masses or hepatosplenomegaly.  Psych: engaged, polite, appropriate conversation and eye contact.    Current Medications:    Current Outpatient Medications:     cetirizine (ZyrTEC) 10 mg tablet, Take 1 tablet (10 mg) by mouth once daily as needed for allergies., Disp: , Rfl:     cholecalciferol (Vitamin D-3) 25 MCG (1000 UT) tablet, Take 1 tablet (1,000 Units) by mouth once daily., Disp: , Rfl:     famotidine (Pepcid) 20 mg tablet, Take 1 tablet (20 mg) by mouth once daily as needed for heartburn., Disp: , Rfl:      Most Recent Labs:  Results for orders placed or performed in visit on 03/04/25   Human Chorionic Gonadotropin, Tumor Marker    Collection Time: 03/04/25  5:30 PM   Result Value Ref Range    HCG, Beta-Quant, Tumor Marker <3 <5 mIU/mL   Alpha-Fetoprotein    Collection Time: 03/04/25  5:30 PM   Result Value Ref Range    Alpha-Fetoprotein <4 0 - 9 ng/mL   Lactate Dehydrogenase    Collection Time: 03/04/25  5:30 PM   Result Value Ref Range     84 - 246 U/L   Comprehensive Metabolic Panel    Collection Time: 03/04/25  5:30 PM   Result Value Ref Range    Glucose 140 (H) 74 - 99 mg/dL    Sodium 140 136 - 145 mmol/L    Potassium 4.1 3.5 - 5.3 mmol/L    Chloride 104 98 - 107 mmol/L    Bicarbonate 29 21 - 32 mmol/L    Anion Gap 11 10 - 20 mmol/L    Urea Nitrogen 13 6 - 23  "mg/dL    Creatinine 0.94 0.50 - 1.30 mg/dL    eGFR >90 >60 mL/min/1.73m*2    Calcium 9.5 8.6 - 10.3 mg/dL    Albumin 4.6 3.4 - 5.0 g/dL    Alkaline Phosphatase 51 33 - 120 U/L    Total Protein 6.8 6.4 - 8.2 g/dL    AST 12 9 - 39 U/L    Bilirubin, Total 0.8 0.0 - 1.2 mg/dL    ALT 10 10 - 52 U/L   CBC and Auto Differential    Collection Time: 03/04/25  5:30 PM   Result Value Ref Range    WBC 8.4 4.4 - 11.3 x10*3/uL    nRBC 0.0 0.0 - 0.0 /100 WBCs    RBC 5.17 4.50 - 5.90 x10*6/uL    Hemoglobin 13.4 (L) 13.5 - 17.5 g/dL    Hematocrit 41.5 41.0 - 52.0 %    MCV 80 80 - 100 fL    MCH 25.9 (L) 26.0 - 34.0 pg    MCHC 32.3 32.0 - 36.0 g/dL    RDW 13.5 11.5 - 14.5 %    Platelets 318 150 - 450 x10*3/uL    Neutrophils % 57.6 40.0 - 80.0 %    Immature Granulocytes %, Automated 0.4 0.0 - 0.9 %    Lymphocytes % 30.7 13.0 - 44.0 %    Monocytes % 8.3 2.0 - 10.0 %    Eosinophils % 2.6 0.0 - 6.0 %    Basophils % 0.4 0.0 - 2.0 %    Neutrophils Absolute 4.87 1.20 - 7.70 x10*3/uL    Immature Granulocytes Absolute, Automated 0.03 0.00 - 0.70 x10*3/uL    Lymphocytes Absolute 2.59 1.20 - 4.80 x10*3/uL    Monocytes Absolute 0.70 0.10 - 1.00 x10*3/uL    Eosinophils Absolute 0.22 0.00 - 0.70 x10*3/uL    Basophils Absolute 0.03 0.00 - 0.10 x10*3/uL      No results found for: \"PSA\"     Performance Status:  ECOG Score: 0- Fully active, able to carry on all pre-disease performance w/o restriction.  Karnofsky Score: 100 - Fully active, able to carry on all pre-disease performed without restriction      Assessment/Plan   Fracisco Reynoso is a 29 y.o. male with a history of stage I NSGCT who presents in follow up on surveillance.     He was diagnosed with stage I nonseminoma without risk factors. 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 He is monitored on surveillance.     Tumor markers are negative, and he has no " clinical signs of recurrence. Imaging without concerns for recurrence. Very small nodule noted of right breast palpated today, appears to have small CT correlate. Will order US for further visualization. Low suspicion for testicular cancer given markers negative. He will discuss scoliosis with PCP.     # Testicular nonseminoma, stage I   - Follows with urology (Dr. Catherine); s/p L orchiectomy 7/2024  - Surveillance              Year 1: AFP, HCG, LDH every 2 months; next due May  CT A/P with IV contrast and chest imaging (CXR or CT chest) every 4 months; next due July    # Right breast nodule  - US ordered today    # Health Maintenance  # Scoliosis  # Back pain  - Continue with PCP and other healthcare providers    RTC 2 months with labs    Total time spent on this encounter was 45 minutes, which included preparation, direct time with patient, documentation, and care coordination on the day of visit.    Thania Rodriguez, MSN, APRN, AGNP-C, AOCNP  Associate Nurse Practitioner  [facility]

## 2025-03-31 ENCOUNTER — APPOINTMENT (OUTPATIENT)
Facility: CLINIC | Age: 30
End: 2025-03-31
Payer: COMMERCIAL

## 2025-04-09 ENCOUNTER — HOSPITAL ENCOUNTER (OUTPATIENT)
Dept: RADIOLOGY | Facility: HOSPITAL | Age: 30
Discharge: HOME | End: 2025-04-09
Payer: COMMERCIAL

## 2025-04-09 ENCOUNTER — APPOINTMENT (OUTPATIENT)
Facility: CLINIC | Age: 30
End: 2025-04-09

## 2025-04-09 VITALS
RESPIRATION RATE: 16 BRPM | TEMPERATURE: 98 F | WEIGHT: 138 LBS | HEIGHT: 72 IN | BODY MASS INDEX: 18.69 KG/M2 | SYSTOLIC BLOOD PRESSURE: 116 MMHG | DIASTOLIC BLOOD PRESSURE: 62 MMHG | HEART RATE: 97 BPM | OXYGEN SATURATION: 98 %

## 2025-04-09 DIAGNOSIS — G89.29 CHRONIC LOW BACK PAIN, UNSPECIFIED BACK PAIN LATERALITY, UNSPECIFIED WHETHER SCIATICA PRESENT: ICD-10-CM

## 2025-04-09 DIAGNOSIS — N63.0 PALPABLE MASS OF BREAST: ICD-10-CM

## 2025-04-09 DIAGNOSIS — M54.50 CHRONIC LOW BACK PAIN, UNSPECIFIED BACK PAIN LATERALITY, UNSPECIFIED WHETHER SCIATICA PRESENT: ICD-10-CM

## 2025-04-09 DIAGNOSIS — Z00.00 HEALTHCARE MAINTENANCE: Primary | ICD-10-CM

## 2025-04-09 DIAGNOSIS — M41.9 SCOLIOSIS, UNSPECIFIED SCOLIOSIS TYPE, UNSPECIFIED SPINAL REGION: ICD-10-CM

## 2025-04-09 DIAGNOSIS — C62.92 MALIGNANT NEOPLASM OF LEFT TESTIS, UNSPECIFIED WHETHER DESCENDED OR UNDESCENDED (MULTI): ICD-10-CM

## 2025-04-09 PROBLEM — F31.9 BIPOLAR AFFECTIVE DISORDER, REMISSION STATUS UNSPECIFIED (MULTI): Status: RESOLVED | Noted: 2024-08-06 | Resolved: 2025-04-09

## 2025-04-09 PROBLEM — F10.21 ALCOHOL DEPENDENCE, IN REMISSION: Status: RESOLVED | Noted: 2024-08-06 | Resolved: 2025-04-09

## 2025-04-09 PROCEDURE — 99214 OFFICE O/P EST MOD 30 MIN: CPT | Performed by: FAMILY MEDICINE

## 2025-04-09 PROCEDURE — 76982 USE 1ST TARGET LESION: CPT | Mod: RT

## 2025-04-09 PROCEDURE — 3008F BODY MASS INDEX DOCD: CPT | Performed by: FAMILY MEDICINE

## 2025-04-09 PROCEDURE — 76642 ULTRASOUND BREAST LIMITED: CPT | Mod: RT

## 2025-04-09 PROCEDURE — 99395 PREV VISIT EST AGE 18-39: CPT | Performed by: FAMILY MEDICINE

## 2025-04-09 PROCEDURE — 1036F TOBACCO NON-USER: CPT | Performed by: FAMILY MEDICINE

## 2025-04-09 PROCEDURE — 72082 X-RAY EXAM ENTIRE SPI 2/3 VW: CPT

## 2025-04-09 PROCEDURE — 90471 IMMUNIZATION ADMIN: CPT | Performed by: FAMILY MEDICINE

## 2025-04-09 PROCEDURE — 72100 X-RAY EXAM L-S SPINE 2/3 VWS: CPT

## 2025-04-09 PROCEDURE — 90472 IMMUNIZATION ADMIN EACH ADD: CPT | Performed by: FAMILY MEDICINE

## 2025-04-09 PROCEDURE — 72100 X-RAY EXAM L-S SPINE 2/3 VWS: CPT | Performed by: RADIOLOGY

## 2025-04-09 PROCEDURE — 90651 9VHPV VACCINE 2/3 DOSE IM: CPT | Performed by: FAMILY MEDICINE

## 2025-04-09 PROCEDURE — 90715 TDAP VACCINE 7 YRS/> IM: CPT | Performed by: FAMILY MEDICINE

## 2025-04-09 PROCEDURE — 77062 BREAST TOMOSYNTHESIS BI: CPT

## 2025-04-09 ASSESSMENT — PATIENT HEALTH QUESTIONNAIRE - PHQ9
1. LITTLE INTEREST OR PLEASURE IN DOING THINGS: SEVERAL DAYS
SUM OF ALL RESPONSES TO PHQ9 QUESTIONS 1 AND 2: 1
2. FEELING DOWN, DEPRESSED OR HOPELESS: NOT AT ALL

## 2025-04-09 NOTE — PATIENT INSTRUCTIONS
Today we performed your Annual Physical Exam.  Your preventative health care, labs and vaccinations have been reviewed and are up to date.      You were given a Tdap vaccine today and this is good for 10 years.    HPV #1 given today.  Follow up in 2 months for hpv #2    I have ordered low back and scoliosis xrays for your low back pain that is radiating to the thighs and recommend that you start Physical Therapy as well.  Follow up when results received. I encourage you to start a regular exercise program as well.      Follow up when results received.    Follow up in 1 year for your Complete Physical Exam

## 2025-04-09 NOTE — PROGRESS NOTES
Subjective   Patient ID: Fracisco Reynoso is a 29 y.o. male who presents for Annual Exam.    Dentist and Eye Doctor appointments: due for dentist, UTD on eye doctor   Immunizations: flu shot at work, due for COVID booster, not sure about tdap   Diet: healthy- chicken, fish, whole grains, veggies, fruits. No soda, occasional sweets.   Exercise: none   Supplements: vitamin D, multivitamin, melatonin    Sexually Active: no, no STD concerns   Cancer Screening:    Prostate: N/A    Colon: N/A    Testicular: PMHx of Testicular Cancer -    Skin: No derm,no new moles, wears sunscreen in the summer    Patient c/o low back pain for the past month. He has a h/o scoliosis seen on imaging. He denies any truama or specific injuries. The pain comes and goes, is localized to a specific point in his lower back, and experiences burning pain in his upper thighs when sitting. He has tried tylenol, ibuprofen, and lidocaine patches which have been helping.     No Bowel or bladder incontinence, No saddle anesthesia    Patient with h/o testicular cancer, had surgery last year, on survelince with oncology. He had a small mass on his R chest on ultrasound, planning to do another ultrasound and mammo later today.         Review of Systems   All other systems reviewed and are negative.        Current Outpatient Medications:     cetirizine (ZyrTEC) 10 mg tablet, Take 1 tablet (10 mg) by mouth once daily as needed for allergies., Disp: , Rfl:     cholecalciferol (Vitamin D-3) 25 MCG (1000 UT) tablet, Take 1 tablet (1,000 Units) by mouth once daily., Disp: , Rfl:     famotidine (Pepcid) 20 mg tablet, Take 1 tablet (20 mg) by mouth once daily as needed for heartburn., Disp: , Rfl:     Allergies   Allergen Reactions    Derm-Apply Rash     DERMABOND post surgical allergic reaction       Past Medical History:   Diagnosis Date    Anxiety     Arthralgia, unspecified joint     Bipolar disorder     Depression     GERD (gastroesophageal reflux disease)     PONV  (postoperative nausea and vomiting)     Seasonal allergies     Testicular cancer (Multi)     Undescended testicle        Social History     Tobacco Use    Smoking status: Former     Current packs/day: 0.00     Types: Cigarettes     Quit date: 2023     Years since quittin.2     Passive exposure: Never    Smokeless tobacco: Former    Tobacco comments:     Per patient used to vape and use cigarettes for 2 years.    Vaping Use    Vaping status: Former   Substance Use Topics    Alcohol use: Never     Comment: Sober since Oct 4 2021    Drug use: Never       Objective     Vitals:    25 0906   BP: 116/62   Pulse: 97   Resp: 16   Temp: 36.7 °C (98 °F)   SpO2: 98%   Weight: 62.6 kg (138 lb)   Height: 1.829 m (6')     No LMP for male patient.    Physical Exam  Constitutional:       General: He is not in acute distress.  HENT:      Head: Normocephalic and atraumatic.      Right Ear: Tympanic membrane normal.      Left Ear: Tympanic membrane normal.      Nose: Nose normal.      Mouth/Throat:      Mouth: Mucous membranes are moist.      Pharynx: Oropharynx is clear.   Eyes:      Extraocular Movements: Extraocular movements intact.      Conjunctiva/sclera: Conjunctivae normal.      Pupils: Pupils are equal, round, and reactive to light.   Cardiovascular:      Rate and Rhythm: Normal rate and regular rhythm.      Heart sounds: Normal heart sounds.   Pulmonary:      Effort: Pulmonary effort is normal.      Breath sounds: Normal breath sounds.   Abdominal:      General: Abdomen is flat. Bowel sounds are normal. There is no distension.      Palpations: Abdomen is soft.      Tenderness: There is no abdominal tenderness.   Skin:     General: Skin is warm and dry.   Neurological:      Mental Status: He is alert.      Deep Tendon Reflexes: Reflexes normal.       Assessment/Plan   Diagnoses and all orders for this visit:  Healthcare maintenance  -     CBC; Future  -     Comprehensive Metabolic Panel; Future  -     Lipid  Panel; Future  -     TSH with reflex to Free T4 if abnormal; Future  -     Vitamin D 25 hydroxy; Future  Chronic low back pain, unspecified back pain laterality, unspecified whether sciatica present  -     XR lumbar spine 2-3 views; Future  -     Referral to Physical Therapy; Future  -     XR full spine 2 view scoliosis; Future  Scoliosis, unspecified scoliosis type, unspecified spinal region  -     Referral to Physical Therapy; Future  -     XR full spine 2 view scoliosis; Future  Other orders  -     Tdap vaccine, age 7 years and older  (BOOSTRIX)  -     HPV 9-valent vaccine (GARDASIL 9)

## 2025-04-10 LAB
25(OH)D3+25(OH)D2 SERPL-MCNC: 41 NG/ML (ref 30–100)
ALBUMIN SERPL-MCNC: 4.6 G/DL (ref 3.6–5.1)
ALP SERPL-CCNC: 52 U/L (ref 36–130)
ALT SERPL-CCNC: 11 U/L (ref 9–46)
ANION GAP SERPL CALCULATED.4IONS-SCNC: 9 MMOL/L (CALC) (ref 7–17)
AST SERPL-CCNC: 14 U/L (ref 10–40)
BILIRUB SERPL-MCNC: 1.1 MG/DL (ref 0.2–1.2)
BUN SERPL-MCNC: 10 MG/DL (ref 7–25)
CALCIUM SERPL-MCNC: 9.7 MG/DL (ref 8.6–10.3)
CHLORIDE SERPL-SCNC: 102 MMOL/L (ref 98–110)
CHOLEST SERPL-MCNC: 127 MG/DL
CHOLEST/HDLC SERPL: 2.6 (CALC)
CO2 SERPL-SCNC: 27 MMOL/L (ref 20–32)
CREAT SERPL-MCNC: 0.81 MG/DL (ref 0.6–1.24)
EGFRCR SERPLBLD CKD-EPI 2021: 122 ML/MIN/1.73M2
ERYTHROCYTE [DISTWIDTH] IN BLOOD BY AUTOMATED COUNT: 13 % (ref 11–15)
GLUCOSE SERPL-MCNC: 81 MG/DL (ref 65–99)
HCT VFR BLD AUTO: 41.6 % (ref 38.5–50)
HDLC SERPL-MCNC: 48 MG/DL
HGB BLD-MCNC: 13.2 G/DL (ref 13.2–17.1)
LDLC SERPL CALC-MCNC: 67 MG/DL (CALC)
MCH RBC QN AUTO: 26 PG (ref 27–33)
MCHC RBC AUTO-ENTMCNC: 31.7 G/DL (ref 32–36)
MCV RBC AUTO: 81.9 FL (ref 80–100)
NONHDLC SERPL-MCNC: 79 MG/DL (CALC)
PLATELET # BLD AUTO: 389 THOUSAND/UL (ref 140–400)
PMV BLD REES-ECKER: 9.7 FL (ref 7.5–12.5)
POTASSIUM SERPL-SCNC: 4.5 MMOL/L (ref 3.5–5.3)
PROT SERPL-MCNC: 7.2 G/DL (ref 6.1–8.1)
RBC # BLD AUTO: 5.08 MILLION/UL (ref 4.2–5.8)
SODIUM SERPL-SCNC: 138 MMOL/L (ref 135–146)
TRIGL SERPL-MCNC: 42 MG/DL
TSH SERPL-ACNC: 1.71 MIU/L (ref 0.4–4.5)
WBC # BLD AUTO: 7.8 THOUSAND/UL (ref 3.8–10.8)

## 2025-05-01 DIAGNOSIS — C62.92 MALIGNANT NEOPLASM OF LEFT TESTIS, UNSPECIFIED WHETHER DESCENDED OR UNDESCENDED (MULTI): ICD-10-CM

## 2025-05-12 ENCOUNTER — LAB (OUTPATIENT)
Dept: LAB | Facility: HOSPITAL | Age: 30
End: 2025-05-12
Payer: COMMERCIAL

## 2025-05-12 ENCOUNTER — TELEPHONE (OUTPATIENT)
Dept: HEMATOLOGY/ONCOLOGY | Facility: CLINIC | Age: 30
End: 2025-05-12
Payer: COMMERCIAL

## 2025-05-12 DIAGNOSIS — C62.92 MALIGNANT NEOPLASM OF LEFT TESTIS, UNSPECIFIED WHETHER DESCENDED OR UNDESCENDED (MULTI): Primary | ICD-10-CM

## 2025-05-12 LAB
ALBUMIN SERPL BCP-MCNC: 5.1 G/DL (ref 3.4–5)
ALP SERPL-CCNC: 58 U/L (ref 33–120)
ALT SERPL W P-5'-P-CCNC: 13 U/L (ref 10–52)
ANION GAP SERPL CALC-SCNC: 16 MMOL/L (ref 10–20)
AST SERPL W P-5'-P-CCNC: 16 U/L (ref 9–39)
BASOPHILS # BLD AUTO: 0.06 X10*3/UL (ref 0–0.1)
BASOPHILS NFR BLD AUTO: 0.5 %
BILIRUB SERPL-MCNC: 1 MG/DL (ref 0–1.2)
BUN SERPL-MCNC: 13 MG/DL (ref 6–23)
CALCIUM SERPL-MCNC: 10.1 MG/DL (ref 8.6–10.3)
CHLORIDE SERPL-SCNC: 101 MMOL/L (ref 98–107)
CO2 SERPL-SCNC: 24 MMOL/L (ref 21–32)
CREAT SERPL-MCNC: 0.84 MG/DL (ref 0.5–1.3)
EGFRCR SERPLBLD CKD-EPI 2021: >90 ML/MIN/1.73M*2
EOSINOPHIL # BLD AUTO: 0.17 X10*3/UL (ref 0–0.7)
EOSINOPHIL NFR BLD AUTO: 1.5 %
ERYTHROCYTE [DISTWIDTH] IN BLOOD BY AUTOMATED COUNT: 14.4 % (ref 11.5–14.5)
GLUCOSE SERPL-MCNC: 82 MG/DL (ref 74–99)
HCT VFR BLD AUTO: 43.5 % (ref 41–52)
HGB BLD-MCNC: 13.8 G/DL (ref 13.5–17.5)
IMM GRANULOCYTES # BLD AUTO: 0.03 X10*3/UL (ref 0–0.7)
IMM GRANULOCYTES NFR BLD AUTO: 0.3 % (ref 0–0.9)
LDH SERPL L TO P-CCNC: 136 U/L (ref 84–246)
LYMPHOCYTES # BLD AUTO: 2.88 X10*3/UL (ref 1.2–4.8)
LYMPHOCYTES NFR BLD AUTO: 25.1 %
MCH RBC QN AUTO: 25.6 PG (ref 26–34)
MCHC RBC AUTO-ENTMCNC: 31.7 G/DL (ref 32–36)
MCV RBC AUTO: 81 FL (ref 80–100)
MONOCYTES # BLD AUTO: 0.89 X10*3/UL (ref 0.1–1)
MONOCYTES NFR BLD AUTO: 7.8 %
NEUTROPHILS # BLD AUTO: 7.43 X10*3/UL (ref 1.2–7.7)
NEUTROPHILS NFR BLD AUTO: 64.8 %
NRBC BLD-RTO: 0 /100 WBCS (ref 0–0)
PLATELET # BLD AUTO: 340 X10*3/UL (ref 150–450)
POTASSIUM SERPL-SCNC: 4.7 MMOL/L (ref 3.5–5.3)
PROT SERPL-MCNC: 7.6 G/DL (ref 6.4–8.2)
RBC # BLD AUTO: 5.4 X10*6/UL (ref 4.5–5.9)
SODIUM SERPL-SCNC: 136 MMOL/L (ref 136–145)
WBC # BLD AUTO: 11.5 X10*3/UL (ref 4.4–11.3)

## 2025-05-12 PROCEDURE — 83615 LACTATE (LD) (LDH) ENZYME: CPT

## 2025-05-12 PROCEDURE — 85025 COMPLETE CBC W/AUTO DIFF WBC: CPT

## 2025-05-12 PROCEDURE — 80053 COMPREHEN METABOLIC PANEL: CPT

## 2025-05-12 NOTE — TELEPHONE ENCOUNTER
Left voicemail for patient regarding need to reschedule 5/22 appointment with Thania Rodriguez, offered 5/15 with Dr. Mateusz Amezquita or 5/29 with Thania Rodriguez.  Instructed patient to call back or send my chart message with preference.

## 2025-05-13 LAB — AFP SERPL-MCNC: <4 NG/ML (ref 0–9)

## 2025-05-15 ENCOUNTER — APPOINTMENT (OUTPATIENT)
Dept: HEMATOLOGY/ONCOLOGY | Facility: CLINIC | Age: 30
End: 2025-05-15
Payer: COMMERCIAL

## 2025-05-15 ENCOUNTER — APPOINTMENT (OUTPATIENT)
Facility: CLINIC | Age: 30
End: 2025-05-15
Payer: COMMERCIAL

## 2025-05-22 ENCOUNTER — OFFICE VISIT (OUTPATIENT)
Dept: HEMATOLOGY/ONCOLOGY | Facility: CLINIC | Age: 30
End: 2025-05-22
Payer: COMMERCIAL

## 2025-05-22 ENCOUNTER — APPOINTMENT (OUTPATIENT)
Facility: CLINIC | Age: 30
End: 2025-05-22
Payer: COMMERCIAL

## 2025-05-22 ENCOUNTER — LAB (OUTPATIENT)
Dept: LAB | Facility: CLINIC | Age: 30
End: 2025-05-22
Payer: COMMERCIAL

## 2025-05-22 VITALS
BODY MASS INDEX: 18.26 KG/M2 | SYSTOLIC BLOOD PRESSURE: 136 MMHG | WEIGHT: 134.6 LBS | TEMPERATURE: 97.5 F | DIASTOLIC BLOOD PRESSURE: 74 MMHG | OXYGEN SATURATION: 98 % | HEART RATE: 83 BPM | RESPIRATION RATE: 18 BRPM

## 2025-05-22 DIAGNOSIS — C62.92 MALIGNANT NEOPLASM OF LEFT TESTIS, UNSPECIFIED WHETHER DESCENDED OR UNDESCENDED (MULTI): Primary | ICD-10-CM

## 2025-05-22 DIAGNOSIS — C62.92 MALIGNANT NEOPLASM OF LEFT TESTIS, UNSPECIFIED WHETHER DESCENDED OR UNDESCENDED (MULTI): ICD-10-CM

## 2025-05-22 DIAGNOSIS — Z23 NEED FOR VACCINATION: ICD-10-CM

## 2025-05-22 PROCEDURE — 36415 COLL VENOUS BLD VENIPUNCTURE: CPT

## 2025-05-22 PROCEDURE — 90651 9VHPV VACCINE 2/3 DOSE IM: CPT | Performed by: FAMILY MEDICINE

## 2025-05-22 PROCEDURE — 99215 OFFICE O/P EST HI 40 MIN: CPT | Performed by: NURSE PRACTITIONER

## 2025-05-22 PROCEDURE — 1036F TOBACCO NON-USER: CPT | Performed by: NURSE PRACTITIONER

## 2025-05-22 PROCEDURE — 90471 IMMUNIZATION ADMIN: CPT | Performed by: FAMILY MEDICINE

## 2025-05-22 PROCEDURE — 84702 CHORIONIC GONADOTROPIN TEST: CPT

## 2025-05-22 RX ORDER — BISMUTH SUBSALICYLATE 262 MG
1 TABLET,CHEWABLE ORAL DAILY
COMMUNITY

## 2025-05-22 ASSESSMENT — PAIN SCALES - GENERAL: PAINLEVEL_OUTOF10: 0-NO PAIN

## 2025-05-22 NOTE — PROGRESS NOTES
Patient ID: Fracisco Reynoso is a 29 y.o. male.  Attending Physician: Dr. Mateusz Amezquita  Cancer Diagnosis:  Cancer Staging   Left testicular cancer (Multi)  Staging form: Testis, AJCC 8th Edition  - Clinical: cTis, cN0, cM0, S0 - Signed by Mateusz Amezquita MD PhD on 11/7/2024     Current Therapy: Surveillance  Genetics: N/A    Subjective      Cancer History:  Oncology History   Left testicular cancer (Multi)   8/6/2024 Initial Diagnosis    Left testicular cancer (Multi)     11/7/2024 Cancer Staged    Staging form: Testis, AJCC 8th Edition, Clinical: cTis, cN0, cM0, S0 - Signed by Mateusz Amezquita MD PhD on 11/7/2024 7/9/24              US scrotum shows 2 suspicious masses in L testicle                          ,      7/18/24 CT C/A/P showed no metastases     7/29/24            s/p left radical orchiectomy, stage I (pT1bN0) without LVI, margins negative      8/30/24 Tumor markers negative (HCG <3, AFP <4)    1/7/25  Tumor markers negative    Interval History:  Fracisco presents in follow up today on surveillance. He's been seeing PCP for scoliosis. Thinking of PT. Breast nodule benign. He hasn't had any new or concerning symptoms otherwise. The remainder of his ROS is otherwise negative.  HPI    Objective    BSA: 1.76 meters squared  /74 (BP Location: Right arm, Patient Position: Sitting, BP Cuff Size: Adult)   Pulse 83   Temp 36.4 °C (97.5 °F) (Temporal)   Resp 18   Wt 61.1 kg (134 lb 9.6 oz)   SpO2 98%   BMI 18.26 kg/m²     Physical Exam  General: alert, well-dressed in NAD. Speech is fluent and coherent, words clear. Good insight.  Skin: warm, dry, and pink without cyanosis or nail clubbing. No rash, petechiae, or ecchymoses  HEENT: Normocephalic atraumatic. Sclera white, conjunctiva pink. EOMs intact. Hearing intact to spoken voice. Oral mucosa pink. No visible lesions  Respiratory: Chest expansion symmetric. Breath sounds vesicular in all lobes without crackles, wheezes, or rhonchi  bilaterally.  CV: S1S2 audible and crisp without murmurs, rubs, or extra sounds. RRR. Radial pulses strong and regular. Extremities warm and appropriate color for race. No edema bilaterally.  Lymph: no palpable cervical, submandibular, or supraclavicular lymphadenopathy.  GI: abdomen is round, soft, and non-tender. Active bowel sounds.   Psych: engaged, polite, appropriate conversation and eye contact.    Current Medications:    Current Outpatient Medications:     cetirizine (ZyrTEC) 10 mg tablet, Take 1 tablet (10 mg) by mouth once daily as needed for allergies., Disp: , Rfl:     famotidine (Pepcid) 20 mg tablet, Take 1 tablet (20 mg) by mouth once daily as needed for heartburn., Disp: , Rfl:     multivitamin tablet, Take 1 tablet by mouth once daily., Disp: , Rfl:      Most Recent Labs:  Results for orders placed or performed in visit on 05/01/25   Alpha-Fetoprotein    Collection Time: 05/12/25  5:45 PM   Result Value Ref Range    Alpha-Fetoprotein <4 0 - 9 ng/mL   Lactate Dehydrogenase    Collection Time: 05/12/25  5:45 PM   Result Value Ref Range     84 - 246 U/L   Comprehensive Metabolic Panel    Collection Time: 05/12/25  5:45 PM   Result Value Ref Range    Glucose 82 74 - 99 mg/dL    Sodium 136 136 - 145 mmol/L    Potassium 4.7 3.5 - 5.3 mmol/L    Chloride 101 98 - 107 mmol/L    Bicarbonate 24 21 - 32 mmol/L    Anion Gap 16 10 - 20 mmol/L    Urea Nitrogen 13 6 - 23 mg/dL    Creatinine 0.84 0.50 - 1.30 mg/dL    eGFR >90 >60 mL/min/1.73m*2    Calcium 10.1 8.6 - 10.3 mg/dL    Albumin 5.1 (H) 3.4 - 5.0 g/dL    Alkaline Phosphatase 58 33 - 120 U/L    Total Protein 7.6 6.4 - 8.2 g/dL    AST 16 9 - 39 U/L    Bilirubin, Total 1.0 0.0 - 1.2 mg/dL    ALT 13 10 - 52 U/L   CBC and Auto Differential    Collection Time: 05/12/25  5:45 PM   Result Value Ref Range    WBC 11.5 (H) 4.4 - 11.3 x10*3/uL    nRBC 0.0 0.0 - 0.0 /100 WBCs    RBC 5.40 4.50 - 5.90 x10*6/uL    Hemoglobin 13.8 13.5 - 17.5 g/dL    Hematocrit 43.5  "41.0 - 52.0 %    MCV 81 80 - 100 fL    MCH 25.6 (L) 26.0 - 34.0 pg    MCHC 31.7 (L) 32.0 - 36.0 g/dL    RDW 14.4 11.5 - 14.5 %    Platelets 340 150 - 450 x10*3/uL    Neutrophils % 64.8 40.0 - 80.0 %    Immature Granulocytes %, Automated 0.3 0.0 - 0.9 %    Lymphocytes % 25.1 13.0 - 44.0 %    Monocytes % 7.8 2.0 - 10.0 %    Eosinophils % 1.5 0.0 - 6.0 %    Basophils % 0.5 0.0 - 2.0 %    Neutrophils Absolute 7.43 1.20 - 7.70 x10*3/uL    Immature Granulocytes Absolute, Automated 0.03 0.00 - 0.70 x10*3/uL    Lymphocytes Absolute 2.88 1.20 - 4.80 x10*3/uL    Monocytes Absolute 0.89 0.10 - 1.00 x10*3/uL    Eosinophils Absolute 0.17 0.00 - 0.70 x10*3/uL    Basophils Absolute 0.06 0.00 - 0.10 x10*3/uL      No results found for: \"PSA\"     Performance Status:  ECOG Score: 0- Fully active, able to carry on all pre-disease performance w/o restriction.  Karnofsky Score: 100 - Fully active, able to carry on all pre-disease performed without restriction      Assessment/Plan   Fracisco Reynoso is a 29 y.o. male with a history of stage I NSGCT who presents in follow up on surveillance.     He was diagnosed with stage I nonseminoma without risk factors. 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 He is monitored on surveillance.     Tumor markers are negative, but bHCG not drawn. Doing well otherwise. Will have drawn today,    # Testicular nonseminoma, stage I   - Follows with urology (Dr. Catherine); s/p L orchiectomy 7/2024  - Surveillance              Year 1: AFP, HCG, LDH every 2 months; next due July  CT A/P with IV contrast and chest imaging (CXR or CT chest) every 4 months; next due July    # Right breast nodule  - Benign    # Health Maintenance  # Scoliosis  # Back pain  - Continue with PCP and other healthcare providers    RTC 2 months with labs    Total time spent on this encounter was 40 " minutes, which included preparation, direct time with patient, documentation, and care coordination on the day of visit.    Thania Rodriguez, MSN, APRN, AGNP-C, AOCNP  Associate Nurse Practitioner  [facility]

## 2025-05-23 LAB — B-HCG SERPL-ACNC: <3 MIU/ML

## 2025-07-07 DIAGNOSIS — C62.92 MALIGNANT NEOPLASM OF LEFT TESTIS, UNSPECIFIED WHETHER DESCENDED OR UNDESCENDED (MULTI): ICD-10-CM

## 2025-07-11 ENCOUNTER — HOSPITAL ENCOUNTER (OUTPATIENT)
Dept: RADIOLOGY | Facility: HOSPITAL | Age: 30
Discharge: HOME | End: 2025-07-11
Payer: COMMERCIAL

## 2025-07-11 DIAGNOSIS — C62.92 MALIGNANT NEOPLASM OF LEFT TESTIS, UNSPECIFIED WHETHER DESCENDED OR UNDESCENDED (MULTI): ICD-10-CM

## 2025-07-11 LAB
CREAT SERPL-MCNC: 0.9 MG/DL (ref 0.6–1.3)
GFR SERPL CREATININE-BSD FRML MDRD: >90 ML/MIN/1.73M*2

## 2025-07-11 PROCEDURE — 82565 ASSAY OF CREATININE: CPT

## 2025-07-11 PROCEDURE — 2550000001 HC RX 255 CONTRASTS: Performed by: NURSE PRACTITIONER

## 2025-07-11 PROCEDURE — 74177 CT ABD & PELVIS W/CONTRAST: CPT

## 2025-07-11 RX ADMIN — IOHEXOL 75 ML: 350 INJECTION, SOLUTION INTRAVENOUS at 17:45

## 2025-07-14 ENCOUNTER — LAB (OUTPATIENT)
Dept: LAB | Facility: HOSPITAL | Age: 30
End: 2025-07-14
Payer: COMMERCIAL

## 2025-07-14 DIAGNOSIS — C62.92 MALIGNANT NEOPLASM OF LEFT TESTIS, UNSPECIFIED WHETHER DESCENDED OR UNDESCENDED (MULTI): Primary | ICD-10-CM

## 2025-07-14 LAB
ALBUMIN SERPL BCP-MCNC: 4.8 G/DL (ref 3.4–5)
ALP SERPL-CCNC: 46 U/L (ref 33–120)
ALT SERPL W P-5'-P-CCNC: 11 U/L (ref 10–52)
ANION GAP SERPL CALC-SCNC: 12 MMOL/L (ref 10–20)
AST SERPL W P-5'-P-CCNC: 15 U/L (ref 9–39)
B-HCG SERPL-ACNC: <2 MIU/ML
BASOPHILS # BLD AUTO: 0.06 X10*3/UL (ref 0–0.1)
BASOPHILS NFR BLD AUTO: 0.7 %
BILIRUB SERPL-MCNC: 0.9 MG/DL (ref 0–1.2)
BUN SERPL-MCNC: 13 MG/DL (ref 6–23)
CALCIUM SERPL-MCNC: 9.7 MG/DL (ref 8.6–10.3)
CHLORIDE SERPL-SCNC: 100 MMOL/L (ref 98–107)
CO2 SERPL-SCNC: 30 MMOL/L (ref 21–32)
CREAT SERPL-MCNC: 0.96 MG/DL (ref 0.5–1.3)
EGFRCR SERPLBLD CKD-EPI 2021: >90 ML/MIN/1.73M*2
EOSINOPHIL # BLD AUTO: 0.21 X10*3/UL (ref 0–0.7)
EOSINOPHIL NFR BLD AUTO: 2.4 %
ERYTHROCYTE [DISTWIDTH] IN BLOOD BY AUTOMATED COUNT: 13.5 % (ref 11.5–14.5)
GLUCOSE SERPL-MCNC: 86 MG/DL (ref 74–99)
HCT VFR BLD AUTO: 44.5 % (ref 41–52)
HGB BLD-MCNC: 13.8 G/DL (ref 13.5–17.5)
IMM GRANULOCYTES # BLD AUTO: 0.03 X10*3/UL (ref 0–0.7)
IMM GRANULOCYTES NFR BLD AUTO: 0.3 % (ref 0–0.9)
LDH SERPL L TO P-CCNC: 132 U/L (ref 84–246)
LYMPHOCYTES # BLD AUTO: 2.68 X10*3/UL (ref 1.2–4.8)
LYMPHOCYTES NFR BLD AUTO: 30 %
MCH RBC QN AUTO: 25.8 PG (ref 26–34)
MCHC RBC AUTO-ENTMCNC: 31 G/DL (ref 32–36)
MCV RBC AUTO: 83 FL (ref 80–100)
MONOCYTES # BLD AUTO: 0.96 X10*3/UL (ref 0.1–1)
MONOCYTES NFR BLD AUTO: 10.8 %
NEUTROPHILS # BLD AUTO: 4.98 X10*3/UL (ref 1.2–7.7)
NEUTROPHILS NFR BLD AUTO: 55.8 %
NRBC BLD-RTO: 0 /100 WBCS (ref 0–0)
PLATELET # BLD AUTO: 335 X10*3/UL (ref 150–450)
POTASSIUM SERPL-SCNC: 4.7 MMOL/L (ref 3.5–5.3)
PROT SERPL-MCNC: 7.6 G/DL (ref 6.4–8.2)
RBC # BLD AUTO: 5.34 X10*6/UL (ref 4.5–5.9)
SODIUM SERPL-SCNC: 137 MMOL/L (ref 136–145)
WBC # BLD AUTO: 8.9 X10*3/UL (ref 4.4–11.3)

## 2025-07-14 PROCEDURE — 36415 COLL VENOUS BLD VENIPUNCTURE: CPT

## 2025-07-14 PROCEDURE — 85025 COMPLETE CBC W/AUTO DIFF WBC: CPT

## 2025-07-14 PROCEDURE — 83615 LACTATE (LD) (LDH) ENZYME: CPT

## 2025-07-14 PROCEDURE — 80053 COMPREHEN METABOLIC PANEL: CPT

## 2025-07-14 PROCEDURE — 82105 ALPHA-FETOPROTEIN SERUM: CPT

## 2025-07-14 PROCEDURE — 84702 CHORIONIC GONADOTROPIN TEST: CPT

## 2025-07-15 LAB — AFP SERPL-MCNC: <4 NG/ML (ref 0–9)

## 2025-07-17 ENCOUNTER — OFFICE VISIT (OUTPATIENT)
Dept: HEMATOLOGY/ONCOLOGY | Facility: CLINIC | Age: 30
End: 2025-07-17
Payer: COMMERCIAL

## 2025-07-17 VITALS
TEMPERATURE: 98.1 F | HEART RATE: 73 BPM | SYSTOLIC BLOOD PRESSURE: 124 MMHG | BODY MASS INDEX: 18.34 KG/M2 | RESPIRATION RATE: 16 BRPM | DIASTOLIC BLOOD PRESSURE: 75 MMHG | OXYGEN SATURATION: 98 % | WEIGHT: 135.21 LBS

## 2025-07-17 DIAGNOSIS — C62.92 MALIGNANT NEOPLASM OF LEFT TESTIS, UNSPECIFIED WHETHER DESCENDED OR UNDESCENDED (MULTI): ICD-10-CM

## 2025-07-17 PROCEDURE — 99214 OFFICE O/P EST MOD 30 MIN: CPT | Performed by: NURSE PRACTITIONER

## 2025-07-17 ASSESSMENT — PAIN SCALES - GENERAL: PAINLEVEL_OUTOF10: 0-NO PAIN

## 2025-07-17 NOTE — PROGRESS NOTES
Patient ID: Fracisco Reynoso is a 29 y.o. male.  Attending Physician: Dr. Mateusz Amezquita  Cancer Diagnosis:  Cancer Staging   Left testicular cancer (Multi)  Staging form: Testis, AJCC 8th Edition  - Clinical: cTis, cN0, cM0, S0 - Signed by Mateusz Amezquita MD PhD on 11/7/2024     Current Therapy: Surveillance  Genetics: N/A    Subjective      Cancer History:  Oncology History   Left testicular cancer (Multi)   8/6/2024 Initial Diagnosis    Left testicular cancer (Multi)     11/7/2024 Cancer Staged    Staging form: Testis, AJCC 8th Edition, Clinical: cTis, cN0, cM0, S0 - Signed by Mateusz Amezquita MD PhD on 11/7/2024 7/9/24              US scrotum shows 2 suspicious masses in L testicle                          ,      7/18/24 CT C/A/P showed no metastases     7/29/24            s/p left radical orchiectomy, stage I (pT1bN0) without LVI, margins negative      8/30/24 Tumor markers negative (HCG <3, AFP <4)    1/7/25  Tumor markers negative    Interval History:  Fracisco presents in follow up today on surveillance. He feels well. Overall, no new or concerning health issues. The remainder of his ROS is otherwise negative.  HPI    Objective    BSA: 1.76 meters squared  /75 (BP Location: Left arm, Patient Position: Sitting)   Pulse 73   Temp 36.7 °C (98.1 °F) (Temporal)   Resp 16   Wt 61.3 kg (135 lb 3.4 oz)   SpO2 98%   BMI 18.34 kg/m²     Physical Exam  General: alert, well-dressed in NAD. Speech is fluent and coherent, words clear. Good insight.  Skin: warm, dry, and pink without cyanosis or nail clubbing. No rash, petechiae, or ecchymoses  HEENT: Normocephalic atraumatic. Sclera white, conjunctiva pink. EOMs intact. Hearing intact to spoken voice. Oral mucosa pink. No visible lesions  Respiratory: Chest expansion symmetric. Breath sounds vesicular in all lobes without crackles, wheezes, or rhonchi bilaterally.  CV: S1S2 audible and crisp without murmurs, rubs, or extra sounds. RRR. Radial pulses  "strong and regular. Extremities warm and appropriate color for race. No edema bilaterally.  Lymph: no palpable cervical, submandibular, or supraclavicular lymphadenopathy.  GI: abdomen is round, soft, and non-tender. Active bowel sounds.   Psych: engaged, polite, appropriate conversation and eye contact.    Current Medications:    Current Outpatient Medications:     cetirizine (ZyrTEC) 10 mg tablet, Take 1 tablet (10 mg) by mouth once daily as needed for allergies., Disp: , Rfl:     famotidine (Pepcid) 20 mg tablet, Take 1 tablet (20 mg) by mouth once daily as needed for heartburn., Disp: , Rfl:     multivitamin tablet, Take 1 tablet by mouth once daily., Disp: , Rfl:      Most Recent Labs:  Results for orders placed or performed in visit on 07/14/25   Human Chorionic Gonadotropin, Serum Quantitative    Collection Time: 07/14/25  5:34 PM   Result Value Ref Range    HCG, Beta-Quantitative <2 <5 mIU/mL      No results found for: \"PSA\"     Performance Status:  ECOG Score: 0- Fully active, able to carry on all pre-disease performance w/o restriction.  Karnofsky Score: 100 - Fully active, able to carry on all pre-disease performed without restriction      Assessment/Plan   Fracisco Reynoso is a 29 y.o. male with a history of stage I NSGCT who presents in follow up on surveillance.     He was diagnosed with stage I nonseminoma without risk factors. 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 He is monitored on surveillance.     Tumor markers and imaging have been negative. Thus, we will continue surveillance. Now entering year two.    # Testicular nonseminoma, stage I   - Follows with urology (Dr. Catherine); s/p L orchiectomy 7/2024  - Surveillance              Year 2: AFP, HCG, LDH every 3 months; next due July  CT A/P with IV contrast and chest imaging (CXR or CT chest) every 6 months; " next due January    # Right breast nodule  - Benign    # Health Maintenance  # Scoliosis  # Back pain  - Continue with PCP and other healthcare providers    RTC 3 months with labs    Total time spent on this encounter was 35 minutes, which included preparation, direct time with patient, documentation, and care coordination on the day of visit.    Thania Rodriguez, MSN, APRN, AGNP-C, AOCNP  Associate Nurse Practitioner  [facility]

## 2025-07-27 ENCOUNTER — PATIENT MESSAGE (OUTPATIENT)
Facility: CLINIC | Age: 30
End: 2025-07-27
Payer: COMMERCIAL

## 2025-07-27 DIAGNOSIS — G89.29 CHRONIC LOW BACK PAIN WITHOUT SCIATICA, UNSPECIFIED BACK PAIN LATERALITY: Primary | ICD-10-CM

## 2025-07-27 DIAGNOSIS — M54.50 CHRONIC LOW BACK PAIN WITHOUT SCIATICA, UNSPECIFIED BACK PAIN LATERALITY: Primary | ICD-10-CM

## 2025-07-28 ENCOUNTER — HOSPITAL ENCOUNTER (OUTPATIENT)
Dept: RADIOLOGY | Facility: HOSPITAL | Age: 30
Discharge: HOME | End: 2025-07-28
Payer: COMMERCIAL

## 2025-07-28 DIAGNOSIS — M54.50 CHRONIC LOW BACK PAIN WITHOUT SCIATICA, UNSPECIFIED BACK PAIN LATERALITY: ICD-10-CM

## 2025-07-28 DIAGNOSIS — G89.29 CHRONIC LOW BACK PAIN WITHOUT SCIATICA, UNSPECIFIED BACK PAIN LATERALITY: ICD-10-CM

## 2025-07-28 PROCEDURE — 72220 X-RAY EXAM SACRUM TAILBONE: CPT | Performed by: RADIOLOGY

## 2025-07-28 PROCEDURE — 72202 X-RAY EXAM SI JOINTS 3/> VWS: CPT

## 2025-07-28 PROCEDURE — 72202 X-RAY EXAM SI JOINTS 3/> VWS: CPT | Performed by: RADIOLOGY

## 2025-07-28 PROCEDURE — 72220 X-RAY EXAM SACRUM TAILBONE: CPT

## 2025-10-23 ENCOUNTER — APPOINTMENT (OUTPATIENT)
Facility: CLINIC | Age: 30
End: 2025-10-23
Payer: COMMERCIAL

## (undated) DEVICE — DRAIN, PENROSE, 0.25 X 12 IN

## (undated) DEVICE — Device

## (undated) DEVICE — GLOVE, SURGICAL, PROTEXIS PI MICRO, 7.5, PF, LF

## (undated) DEVICE — SOLUTION, IRRIGATION, STERILE WATER, 1000 ML, POUR BOTTLE

## (undated) DEVICE — SUTURE, VICRYL, 4-0, 18 IN, UNDYED BR PS-2

## (undated) DEVICE — SUPPORTER, ATHLETIC,  ADULT, MEDIUM

## (undated) DEVICE — TOWEL PACK, STERILE, 4/PACK, BLUE

## (undated) DEVICE — APPLICATOR, CHLORAPREP, W/ORANGE TINT, 26ML

## (undated) DEVICE — SOLUTION, IRRIGATION, SODIUM CHLORIDE 0.9%, 1000 ML, POUR BOTTLE

## (undated) DEVICE — DRESSING, GAUZE, SUPER KERLIX, 6X6

## (undated) DEVICE — ADHESIVE, SKIN, DERMABOND ADVANCED, 15CM, PEN-STYLE

## (undated) DEVICE — SUTURE, VICRYL, 2-0, 27 IN, SH, UNDYED

## (undated) DEVICE — SUTURE, VICRYL, 3-0, 27 IN, SH